# Patient Record
Sex: FEMALE | Race: WHITE | NOT HISPANIC OR LATINO | Employment: FULL TIME | URBAN - METROPOLITAN AREA
[De-identification: names, ages, dates, MRNs, and addresses within clinical notes are randomized per-mention and may not be internally consistent; named-entity substitution may affect disease eponyms.]

---

## 2017-06-27 ENCOUNTER — OFFICE VISIT (OUTPATIENT)
Dept: SURGERY | Facility: CLINIC | Age: 22
End: 2017-06-27
Payer: COMMERCIAL

## 2017-06-27 VITALS
SYSTOLIC BLOOD PRESSURE: 131 MMHG | RESPIRATION RATE: 18 BRPM | WEIGHT: 232.2 LBS | HEIGHT: 63 IN | HEART RATE: 74 BPM | BODY MASS INDEX: 41.14 KG/M2 | OXYGEN SATURATION: 98 % | TEMPERATURE: 98.2 F | DIASTOLIC BLOOD PRESSURE: 86 MMHG

## 2017-06-27 DIAGNOSIS — E06.3 HYPOTHYROIDISM DUE TO HASHIMOTO'S THYROIDITIS: ICD-10-CM

## 2017-06-27 DIAGNOSIS — Z13.228 SCREENING FOR ENDOCRINE, NUTRITIONAL, METABOLIC AND IMMUNITY DISORDER: ICD-10-CM

## 2017-06-27 DIAGNOSIS — Z13.21 SCREENING FOR ENDOCRINE, NUTRITIONAL, METABOLIC AND IMMUNITY DISORDER: ICD-10-CM

## 2017-06-27 DIAGNOSIS — E66.01 MORBID OBESITY WITH BMI OF 50.0-59.9, ADULT (H): Primary | ICD-10-CM

## 2017-06-27 DIAGNOSIS — M25.551 HIP PAIN, RIGHT: ICD-10-CM

## 2017-06-27 DIAGNOSIS — N97.9 INFERTILITY, FEMALE: ICD-10-CM

## 2017-06-27 DIAGNOSIS — E28.2 PCOS (POLYCYSTIC OVARIAN SYNDROME): ICD-10-CM

## 2017-06-27 DIAGNOSIS — E66.01 MORBID OBESITY WITH BMI OF 50.0-59.9, ADULT (H): ICD-10-CM

## 2017-06-27 DIAGNOSIS — Z13.29 SCREENING FOR ENDOCRINE, NUTRITIONAL, METABOLIC AND IMMUNITY DISORDER: ICD-10-CM

## 2017-06-27 DIAGNOSIS — E61.1 IRON DEFICIENCY: ICD-10-CM

## 2017-06-27 DIAGNOSIS — Z13.0 SCREENING FOR ENDOCRINE, NUTRITIONAL, METABOLIC AND IMMUNITY DISORDER: ICD-10-CM

## 2017-06-27 DIAGNOSIS — J45.20 MILD INTERMITTENT ASTHMA WITHOUT COMPLICATION: ICD-10-CM

## 2017-06-27 PROCEDURE — 99205 OFFICE O/P NEW HI 60 MIN: CPT | Performed by: PHYSICIAN ASSISTANT

## 2017-06-27 PROCEDURE — 97802 MEDICAL NUTRITION INDIV IN: CPT | Performed by: DIETITIAN, REGISTERED

## 2017-06-27 RX ORDER — NORETHINDRONE ACETATE AND ETHINYL ESTRADIOL 1MG-20(21)
1 KIT ORAL DAILY
Status: ON HOLD | COMMUNITY
End: 2019-01-09

## 2017-06-27 RX ORDER — ALBUTEROL SULFATE 90 UG/1
2 AEROSOL, METERED RESPIRATORY (INHALATION) EVERY 4 HOURS PRN
COMMUNITY
End: 2021-09-03

## 2017-06-27 RX ORDER — ALBUTEROL SULFATE 5 MG/ML
2.5 SOLUTION RESPIRATORY (INHALATION) EVERY 6 HOURS PRN
COMMUNITY
End: 2017-06-27

## 2017-06-27 NOTE — PATIENT INSTRUCTIONS
Please refer to your task list created today at your appointment.  Make appointment to return to clinic in 1 month to see the dietician.  Have initial labs drawn.     Check in at the [facility] on the 1st floor if you would like to have your blood tests drawn today or call [phone] to make an appointment to have them drawn on another day.  You may also get your blood drawn at your [facility] clinic.

## 2017-06-27 NOTE — MR AVS SNAPSHOT
After Visit Summary   6/27/2017    Zelda Paulson    MRN: [id number]           Patient Information     Date Of Birth          1995        Visit Information        Provider Department      6/27/2017 1:00 PM Nida Fortune PA-C [facility]      Today's Diagnoses     Morbid obesity with BMI of 50.0-59.9, adult (H)    -  1    PCOS (polycystic ovarian syndrome)        Iron deficiency        Screening for endocrine, nutritional, metabolic and immunity disorder        Infertility, female        Hip pain, right          Care Instructions    Please refer to your task list created today at your appointment.  Make appointment to return to clinic in 1 month to see the dietician.  Have initial labs drawn.     [facility] at the [facility] on the 1st floor if you would like to have your blood tests drawn today or call [phone] to make an appointment to have them drawn on another day.  You may also get your blood drawn at your [facility] clinic.\          Follow-ups after your visit        Additional Services     NUTRITION REFERRAL       Type of nutrition instruction needed: Weight Loss  Your provider has referred you to:     [facility]                  Your next 10 appointments already scheduled     Jun 27, 2017  2:00 PM CDT   Evaluation with Thelma Alex 3, RD   [facility] ([facility])    [address] [place] [address] [phone]              Future tests that were ordered for you today     Open Future Orders        Priority Expected Expires Ordered    Ferritin Routine 6/27/2017 6/27/2018 6/27/2017    CBC with platelets Routine 6/27/2017 12/24/2017 6/27/2017    Comprehensive metabolic panel Routine 6/27/2017 12/24/2017 6/27/2017    Hemoglobin A1c Routine 6/27/2017 12/24/2017 6/27/2017    Vitamin D  "Deficiency Routine 6/27/2017 12/24/2017 6/27/2017            Who to contact     If you have questions or need follow up information about today's clinic visit or your schedule please contact [facility] Madi RUST directly at [phone].  Normal or non-critical lab and imaging results will be communicated to you by MyChart, letter or phone within 4 business days after the clinic has received the results. If you do not hear from us within 7 days, please contact the clinic through [employer] or phone. If you have a critical or abnormal lab result, we will notify you by phone as soon as possible.  Submit refill requests through [employer] or call your pharmacy and they will forward the refill request to us. Please allow 3 business days for your refill to be completed.          Additional Information About Your Visit        [employer] Information     [employer] gives you secure access to your electronic health record. If you see a primary care provider, you can also send messages to your care team and make appointments. If you have questions, please call your primary care clinic.  If you do not have a primary care provider, please call [phone] and they will assist you.        Care EveryWhere ID     This is your Care EveryWhere ID. This could be used by other organizations to access your [place] medical records  [id number]        Your Vitals Were     Pulse Temperature Respirations Height Pulse Oximetry BMI (Body Mass Index)    74 98.2  F (36.8  C) 18 5' 2.5\" (1.588 m) 98% 58.17 kg/m2       Blood Pressure from Last 3 Encounters:   06/27/17 131/86    Weight from Last 3 Encounters:   06/27/17 (!) 323 lb 3.2 oz (146.6 kg)              We Performed the Following     NUTRITION REFERRAL     OP ROOMING NOTE TO MARIAN        Primary Care Provider Office Phone # Fax #    Jasmin Celeste [phone]       [facility] [address] [address]        Equal Access to Services  "    DANIEL SORIANO : Hadii aad ku jade Henderson, waaxda luqadaha, qaybta kaalmada sybilabbe, reymundo dominguez haypatricia beltranjuliaayad hernandez . So [facility] [phone].    ATENCIÓN: Si habla español, tiene a shah disposición servicios gratuitos de asistencia lingüística. Llame al [phone].    We comply with applicable federal civil rights laws and [place] laws. We do not discriminate on the basis of race, color, national origin, age, disability sex, sexual orientation or gender identity.            Thank you!     Thank you for choosing [facility]  for your care. Our goal is always to provide you with excellent care. Hearing back from our patients is one way we can continue to improve our services. Please take a few minutes to complete the written survey that you may receive in the mail after your visit with us. Thank you!             Your Updated Medication List - Protect others around you: Learn how to safely use, store and throw away your medicines at www.disposemymeds.org.          This list is accurate as of: 6/27/17  1:54 PM.  Always use your most recent med list.                   Brand Name Dispense Instructions for use Diagnosis    albuterol 108 (90 BASE) MCG/ACT Inhaler    PROAIR HFA/PROVENTIL HFA/VENTOLIN HFA     Inhale 2 puffs into the lungs every 4 hours as needed for shortness of breath / dyspnea or wheezing        LEVOTHYROXINE SODIUM PO      Take 25 mcg by mouth daily        metFORMIN 500 MG tablet    GLUCOPHAGE     Take 500 mg by mouth 2 times daily (with meals)        norethindrone-ethinyl estradiol 1-20 MG-MCG per tablet    JUNEL FE 1/20     Take 1 tablet by mouth daily

## 2017-06-27 NOTE — PROGRESS NOTES
[facility] [place] [facility]  [address]  [place] [address]  Phone [phone] Fax [phone]    Date: 2017    RE: REJI MEJIA  MR#: [id number]  : 1995    Dear Dr. Jasmin Alonso,  [facility]    I had the pleasure of seeing your patient, REJI MEJIA, to evaluate her obesity and consider her for possible weight loss surgery. As you know, REJI is 22 years old. She has been overweight since puberty. She has been morbidly obese for the last 5 years and has been unable to achieve long-term success with weight loss attempts, such as: Calorie Counting.   Has been trying to get pregnant for the last year. Recently stopped trying and instead is looking into doing this surgery.     Comorbidities of obesity from her past medical history include: PCOS, Mild Intermittent Asthma, Infertility.      The symptoms related to her obesity include Irregular menstrual cycles, Reflux, Hip pain,  Shortness of Breath with activity. The following ADLs are hindered due to her weight: Shortness of breath with little activity.    Non-Obesity Related Past Medical History:  hypothyroidism  H/O Kidney stone  Headaches  Iron deficiency    Past Surgical History:  Appendectomy  Tonsillectomy,   [place] Teeth extracted  Family History:  PGF- Lung Cancer: Cancer type: Lung  MGM- non-Hodgkin's Lymphoma  Social History:  She is  x 1 year.   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  Will be going on her honeymoon in September.    ETOH: Socially  Illicit Drug Use: None  Tobacco Use: No  Support system: my  and mother will be available to help the patient in the early post op period. my  and parents is who the patient can count on for support throughout her weight loss journey.  Can you afford three meals per day? Yes  ?Can you afford the $50-60 per month for vitamins? Yes  A 14 point review of system shows:  Pulmonary: Shortness of Breath with activity,  Female:  Irregular menstrual cycles  "on BCP, infertility on metformin, PCOS  Gastrointestinal: heartburn when stressed  Genitourinary: Kidney stones,  HEENT: Headaches ,  Musculoskeletal: Hip pain,  Neurologic: Migraines,  Psychological: Anxiety,  Pulmonary: Morning headaches    /86  Pulse 74  Temp 98.2  F (36.8  C)  Resp 18  Ht 5' 2.5\" (1.588 m)  Wt 232 lb 3.2 oz (105.3 kg)  SpO2 98%  BMI 41.79 kg/m2  PHYSICAL EXAMINATION:  GENERAL: obese, good health, good development, and normal affect., Alert and oriented x3. NAD  HEENT: Trachea midline, Neck supple without lymphadenopathy, thyroidmegaly, or mass., Oral dentition adequate for chewing  CARDIOVASCULAR: Regular rate and rhythm, No murmurs, rubs or gallops  RESPIRATORY: Good breath sounds, Lung sounds clear to auscultation bilaterally  GASTROINTESTINAL: Obese, Soft, Nontender, Non-distended, No organomeagly or masses  LOWER EXTREMITIES: No LE edema. No cyanosis, ulceration, or chronic venous stasis  MUSCULOSKELETAL: Normal gait, Moves all 4 extremities equal and strong  NEUROLOGIC: cranial nerves II-XII grossly intact  SKIN: No intertriginous irritation or rash    In summary, REJI is morbidly obese with a body mass index of 41.8 kg/m2 and the comorbidities stated above. She attended an informational seminar and is a candidate for Laparoscopic Gastric Sleeve. She will have to complete the following pre-requisites:    Received weight loss goal of 5 lb prior to surgery.  Achieve clearance from dietitian to see surgeon.  Have preoperative laboratory tests drawn.  Psychological Evaluation with MMPI and clearance for weight loss surgery.     Today in the office we discussed gastric sleeve surgery. Preoperative, perioperative, and postoperative processes, management, and follow up were addressed. Risks and benefits were outlined including the risk of death, PE, DVT, ulcer, worsening GERD, N/V, stricture, hernia, wound infection, and vitamin deficiencies. We discussed the need for 2 forms of " birth control for the first 18 months following bariatric surgery. All questions were answered. A goal sheet and support group handout were given to the patient.    Once the patient has completed the requirements set forth in paragraph one, and there are no further recommendations, she will be allowed to see the surgeon of her choice for consultation on the Laparoscopic Gastric Sleeve surgery. Patient verbalizes understanding of the process to surgery and expectations for the postoperative period including the need for lifelong lifestyle changes, vitamin supplementation, and laboratory monitoring.    Thank you for allowing us to participate in her care.    Sincerely,      Nida Fortune PA-C    At [facility] we are committed to providing you exceptional care. Our surgeons specialize in performing the following minimally invasive weight-loss surgeries:    Lashell-en-Y gastric bypass  Laparoscopic adjustable gastric band  Sleeve gastrectomy    If you would like to review aspects of our weight loss surgery program, please visit our website at [email or web address]. If you have any questions, please do not hesitate to contact us at [phone].   This was a 60 minute visit with greater than 50% spent on counseling.

## 2017-06-27 NOTE — MR AVS SNAPSHOT
MRN:[id number]                      After Visit Summary   6/27/2017    Zelda Paluson    MRN: [id number]           Visit Information        Provider Department      6/27/2017 2:00 PM 3, Thelma Alex RD [facility]      Your next 10 appointments already scheduled     Jul 25, 2017  9:30 AM CDT   Weight Loss Visit with Sh Wl Diet 3, RD   [facility] ([facility])    [address] [place] [address] [phone]              MyChart Information     [employer] gives you secure access to your electronic health record. If you see a primary care provider, you can also send messages to your care team and make appointments. If you have questions, please call your primary care clinic.  If you do not have a primary care provider, please call [phone] and they will assist you.        Care EveryWhere ID     This is your Care EveryWhere ID. This could be used by other organizations to access your [place] medical records  [id number]        Equal Access to Services     DANIEL SORIANO : Hadii lucy cheek hadasho Soomaali, waaxda luqadaha, qaybta kaalmada adeegyaabbe, reymundo ybarra. So [facility] [phone].    ATENCIÓN: Si habla español, tiene a shah disposición servicios gratuitos de asistencia lingüística. Llame al [phone].    We comply with applicable federal civil rights laws and [place] laws. We do not discriminate on the basis of race, color, national origin, age, disability sex, sexual orientation or gender identity.

## 2017-06-27 NOTE — PROGRESS NOTES
Name: REJI MEJIA  : 1995  Gender: Female  MRN: [id number]  Age: 22  INITIAL BARIATRIC NUTRITION ASSESSMENT  REASON FOR VISIT:  REJI MEJIA is a 22 year old Female presents today for initial nutrition visit for bariatric surgery.  DIAGNOSIS:  Class III Obesity  ANTHROPOMETRICS:  Height: 62.5 inches  Weight: 232.2 lbs  BMI: 41.8 kg/m2  CURRENT SUPPLEMENTS:  Multivitamin/Mineral: none  WEIGHT LOSS ATTEMPTS:  Calorie Counting  Prescription diet medications:  None  NUTRITION HISTORY:  Meals per day: 3  Snacking: Yes  Breakfast: (eats on the way to work 5am and then again at 9)muffins, yogart, oatmeal  Lunch: Leftovers from dinner  Supper: Chicken breasts, salads, pasta  Snacks: hard boiled eggs, cheese sticks, chips (sometimes no snacks, however)  Drinks: Mt. Dew, water, coffee  Emotional eating: No  Binge eating: No  Night eating: No  Can you afford three meals per day? Yes  Can you afford the $50-60 per month for vitamins? Yes  Comments: Pt as been thinking about surgery since she was 18. Believes that giving up soda will be her biggest misbah. 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 Pursuing surgery for fertility reasons however understands recommendation for no pregnancies until 2 y post-op.   DINING OUT HISTORY:  Frequency per week: Around once a week  Location: sit-down restaurants, fast food  Type of food: Mexican, pizza, chinese  PHYSICAL ACTIVITY:  Type: Walking;Other;Climbing stairs at work  Frequency: 1-2x/week  Duration (min): 30  NUTRITION DIAGNOSIS:  Patient with excessive oral food/beverage intake related to intake of calorically dense food/beverages at meals and/or snacks as evidenced by BMI of 41.8kg/m2.   Unchanged, modified below.   INTERVENTION:  Nutrition Prescription: Recommend nutrient/ energy modification   Goals:  Begin taking multivitamin, calcium and vitamin D per guidelines   Take 20 minutes to finish a meal.   Do not exceed 2 cans of pop each day.   Implementation:  Provided  written guidelines for Laparoscopic Gastric Sleeve surgery.  Provided weight loss suggestions.  Explained diet changes that would occur after surgery.  Emphasized importance of diet and lifestyle modifications.  Discussed necessity for chewable multivitamin/ mineral supplements, calcium with vitamin D, vitamin B-12, vitamin D, Iron and vitamin C supplements.  NUTRITION MONITORING AND EVALUATION:  Verbalizes understanding of surgery diet guidelines.  Anticipated compliance: Good    FOLLOW UP: 1 month  Recommend 2 to 3 follow up visits to assist with lifestyle changes or per insurance requirements.  RD name and number provided.  TIME SPENT WITH PATIENT: 60 minutes  Mikki Zuñiga RD, LD  Clinical Dietitian

## 2017-07-14 ENCOUNTER — HOSPITAL ENCOUNTER (OUTPATIENT)
Dept: LAB | Facility: CLINIC | Age: 22
Discharge: HOME | End: 2017-07-14
Attending: PHYSICIAN ASSISTANT | Admitting: PHYSICIAN ASSISTANT
Payer: COMMERCIAL

## 2017-07-14 DIAGNOSIS — Z13.228 SCREENING FOR ENDOCRINE, NUTRITIONAL, METABOLIC AND IMMUNITY DISORDER: ICD-10-CM

## 2017-07-14 DIAGNOSIS — E66.01 MORBID OBESITY WITH BMI OF 50.0-59.9, ADULT (H): ICD-10-CM

## 2017-07-14 DIAGNOSIS — Z13.21 SCREENING FOR ENDOCRINE, NUTRITIONAL, METABOLIC AND IMMUNITY DISORDER: ICD-10-CM

## 2017-07-14 DIAGNOSIS — Z13.29 SCREENING FOR ENDOCRINE, NUTRITIONAL, METABOLIC AND IMMUNITY DISORDER: ICD-10-CM

## 2017-07-14 DIAGNOSIS — E61.1 IRON DEFICIENCY: ICD-10-CM

## 2017-07-14 DIAGNOSIS — E28.2 PCOS (POLYCYSTIC OVARIAN SYNDROME): ICD-10-CM

## 2017-07-14 DIAGNOSIS — Z13.0 SCREENING FOR ENDOCRINE, NUTRITIONAL, METABOLIC AND IMMUNITY DISORDER: ICD-10-CM

## 2017-07-14 LAB
ALBUMIN SERPL-MCNC: 3.2 G/DL (ref 3.4–5)
ALP SERPL-CCNC: 87 U/L (ref 40–150)
ALT SERPL W P-5'-P-CCNC: 71 U/L (ref 0–50)
ANION GAP SERPL CALCULATED.3IONS-SCNC: 7 MMOL/L (ref 3–14)
AST SERPL W P-5'-P-CCNC: 36 U/L (ref 0–45)
BILIRUB SERPL-MCNC: 0.4 MG/DL (ref 0.2–1.3)
BUN SERPL-MCNC: 12 MG/DL (ref 7–30)
CALCIUM SERPL-MCNC: 8.1 MG/DL (ref 8.5–10.1)
CHLORIDE SERPL-SCNC: 106 MMOL/L (ref 94–109)
CO2 SERPL-SCNC: 26 MMOL/L (ref 20–32)
CREAT SERPL-MCNC: 0.74 MG/DL (ref 0.52–1.04)
DEPRECATED CALCIDIOL+CALCIFEROL SERPL-MC: 24 UG/L (ref 20–75)
ERYTHROCYTE [DISTWIDTH] IN BLOOD BY AUTOMATED COUNT: 13.2 % (ref 10–15)
FERRITIN SERPL-MCNC: 35 NG/ML (ref 12–150)
GFR SERPL CREATININE-BSD FRML MDRD: ABNORMAL ML/MIN/1.7M2
GLUCOSE SERPL-MCNC: 74 MG/DL (ref 70–99)
HBA1C MFR BLD: 5.3 % (ref 4.3–6)
HCT VFR BLD AUTO: 38.3 % (ref 35–47)
HGB BLD-MCNC: 12.5 G/DL (ref 11.7–15.7)
MCH RBC QN AUTO: 28 PG (ref 26.5–33)
MCHC RBC AUTO-ENTMCNC: 32.6 G/DL (ref 31.5–36.5)
MCV RBC AUTO: 86 FL (ref 78–100)
PLATELET # BLD AUTO: 295 10E9/L (ref 150–450)
POTASSIUM SERPL-SCNC: 3.7 MMOL/L (ref 3.4–5.3)
PROT SERPL-MCNC: 7.4 G/DL (ref 6.8–8.8)
RBC # BLD AUTO: 4.47 10E12/L (ref 3.8–5.2)
SODIUM SERPL-SCNC: 139 MMOL/L (ref 133–144)
WBC # BLD AUTO: 8.5 10E9/L (ref 4–11)

## 2017-07-14 PROCEDURE — 82728 ASSAY OF FERRITIN: CPT | Performed by: PHYSICIAN ASSISTANT

## 2017-07-14 PROCEDURE — 80053 COMPREHEN METABOLIC PANEL: CPT | Performed by: PHYSICIAN ASSISTANT

## 2017-07-14 PROCEDURE — 36415 COLL VENOUS BLD VENIPUNCTURE: CPT | Performed by: PHYSICIAN ASSISTANT

## 2017-07-14 PROCEDURE — 85027 COMPLETE CBC AUTOMATED: CPT | Performed by: PHYSICIAN ASSISTANT

## 2017-07-14 PROCEDURE — 83036 HEMOGLOBIN GLYCOSYLATED A1C: CPT | Performed by: PHYSICIAN ASSISTANT

## 2017-07-14 PROCEDURE — 82306 VITAMIN D 25 HYDROXY: CPT | Performed by: PHYSICIAN ASSISTANT

## 2017-07-31 ENCOUNTER — OFFICE VISIT (OUTPATIENT)
Dept: SURGERY | Facility: CLINIC | Age: 22
End: 2017-07-31
Payer: COMMERCIAL

## 2017-07-31 DIAGNOSIS — E66.01 MORBID OBESITY WITH BMI OF 50.0-59.9, ADULT (H): ICD-10-CM

## 2017-07-31 PROCEDURE — 97803 MED NUTRITION INDIV SUBSEQ: CPT | Performed by: DIETITIAN, REGISTERED

## 2017-07-31 NOTE — MR AVS SNAPSHOT
MRN:[id number]                      After Visit Summary   7/31/2017    Zelda Paulson    MRN: [id number]           Visit Information        Provider Department      7/31/2017 1:30 PM Thelma Laura RD [facility] [place] [facility]      Your next 10 appointments already scheduled     Aug 16, 2017 10:00 AM CDT   New Visit with Mackenzie Garrido, PhD   [facility] ([facility])    [address] [place] [address]   [phone]            Aug 23, 2017  2:00 PM CDT   Return Visit with Mackenzie Garrido, PhD   [facility] ([facility])    [address] [place] [address]   [phone]            Aug 28, 2017  1:30 PM CDT   Weight Loss Visit with Thelma Laura RD   [facility])    [address] [place] [address] [phone]            Sep 20, 2017  2:00 PM CDT   Return Visit with Mackenzie Garrido, PhD   [facility] ([facility])    [address] [place] [address]   [phone]              MyChart Information     [employer] gives you secure access to your electronic health record. If you see a primary care provider, you can also send messages to your care team and make appointments. If you have questions, please call your primary care clinic.  If you do not have a primary care provider, please call [phone] and they will assist you.        Care EveryWhere ID     This is your Care EveryWhere ID. This could be used by other organizations to access your [facility] medical records  [id number]        Equal Access to Services     DANIEL SORIANO : Merle Henderson, waaxda luqadaha, qaybta kaalmaabbe cortés, reymundo wright  la'patricia ah. So [facility] [phone].    ATENCIÓN: Si habla español, tiene a shah disposición servicios gratuitos de asistencia lingüística. Llame al [phone].    We comply with applicable federal civil rights laws and [place] laws. We do not discriminate on the basis of race, color, national origin, age, disability sex, sexual orientation or gender identity.

## 2017-07-31 NOTE — PROGRESS NOTES
PRE-SURGICAL WEIGHT LOSS NUTRITION APPOINTMENT  DATE OF VISIT: 2017  Name: REJI MEJIA  : 1995  Gender: Female  MRN: [id number]  Age: 22  ASSESSMENT  REASON FOR VISIT:  REJI MEJIA is a 22 year old Female presents today for a pre-surgical weight loss follow-up appointment.  DIAGNOSIS:  Class III, Morbid Obesity.    ANTHROPOMETRICS:  Height: 62.5 inches  Initial Weight: 232.2 lbs  Weight last visit: 232.2 lbs  Current Weight: 231.1 lbs  BMI: 41.6 kg/m2    NUTRITION HISTORY:  Breakfast: 1 egg, toast/ or oatmeal made with water-8:30 or 9:00 (within 1 hour of waking)  Lunch: Leftovers from dinner  Supper: Chicken breasts or beef, salads, pasta  Snacks: hard boiled eggs or cheese sticks or chips or fruit or veggies-0-2 x per day  Drinks: water or enhanced water, coffee  Consuming liquid calories: no  Eating 3 meals per day: yes  Eating slower: no  Chewing foods thoroughly: yes  Take 30 minutes to consume each meal: Sometimes  Fluids and meals separate by at least 30 minutes: Yes  Comments: pt is proud to say she has had only had 1 pop over the past month; more focused on behavior changes than weight loss this month; pt feels like weight loss was a result of avoiding pop  Desired Surgical Procedure: sleeve gastrectomy  PHYSICAL ACTIVITY:  Type: Walking;Climbing stairs at work  Frequency: 1-2x/week  Duration (min): 30  DIAGNOSIS:  Previous Nutrition Diagnosis: Obesity related to excess energy intake as evidenced by BMI of 41.8  no change, modified below  Previous goals:  Begin taking multivitamin, calcium and vitamin D per guidelines-met  Take 20 minutes to finish a meal-improving  Do not exceed 2 cans of pop each day-exceeded  Current Nutrition Diagnosis: Obesity related to excess energy intake as evidenced by BMI of 41.6  IMPLEMENTATION:  Nutrition Prescription: Recommended energy/nutrient modification  Goals:  Increase exercise to 3 X per week for at least 20 minutes  Track intake and activity on иван;  keeping calories at 8324-4433 kcal/day (11-14 kcal/kg ABW) for weight loss  Consistently take 20-30 minutes for each meal   Implementation:  - Discussed progress towards previous goals  - Reinforced importance of making behavior changes in preparation for bariatric surgery.  NUTRITION MONITORING AND EVALUATION:  Anticipated compliance: fair-good  Patient verbalized good understanding of bariatric diet guidelines.  Follow up:  At 1 month  # of visits needed: 1  Cleared by RD: No  TIME SPENT WITH PATIENT: 25 minutes  Brijesh Mzea RD, LD  [facility]  [phone]

## 2017-08-08 ENCOUNTER — HOSPITAL ENCOUNTER (EMERGENCY)
Facility: CLINIC | Age: 22
Discharge: HOME | End: 2017-08-09
Attending: NURSE PRACTITIONER | Admitting: NURSE PRACTITIONER
Payer: COMMERCIAL

## 2017-08-08 ENCOUNTER — APPOINTMENT (OUTPATIENT)
Dept: CT IMAGING | Facility: CLINIC | Age: 22
End: 2017-08-08
Attending: NURSE PRACTITIONER
Payer: COMMERCIAL

## 2017-08-08 ENCOUNTER — APPOINTMENT (OUTPATIENT)
Dept: ULTRASOUND IMAGING | Facility: CLINIC | Age: 22
End: 2017-08-08
Attending: NURSE PRACTITIONER
Payer: COMMERCIAL

## 2017-08-08 DIAGNOSIS — R19.7 DIARRHEA, UNSPECIFIED TYPE: ICD-10-CM

## 2017-08-08 LAB
ALBUMIN SERPL-MCNC: 3.7 G/DL (ref 3.4–5)
ALBUMIN UR-MCNC: 30 MG/DL
ALP SERPL-CCNC: 83 U/L (ref 40–150)
ALT SERPL W P-5'-P-CCNC: 47 U/L (ref 0–50)
ANION GAP SERPL CALCULATED.3IONS-SCNC: 11 MMOL/L (ref 3–14)
APPEARANCE UR: ABNORMAL
AST SERPL W P-5'-P-CCNC: 26 U/L (ref 0–45)
BASOPHILS # BLD AUTO: 0 10E9/L (ref 0–0.2)
BASOPHILS NFR BLD AUTO: 0.1 %
BILIRUB SERPL-MCNC: 0.4 MG/DL (ref 0.2–1.3)
BILIRUB UR QL STRIP: ABNORMAL
BUN SERPL-MCNC: 12 MG/DL (ref 7–30)
CALCIUM SERPL-MCNC: 8.6 MG/DL (ref 8.5–10.1)
CHLORIDE SERPL-SCNC: 105 MMOL/L (ref 94–109)
CO2 SERPL-SCNC: 24 MMOL/L (ref 20–32)
COLOR UR AUTO: YELLOW
CREAT SERPL-MCNC: 0.85 MG/DL (ref 0.52–1.04)
DIFFERENTIAL METHOD BLD: NORMAL
EOSINOPHIL # BLD AUTO: 0 10E9/L (ref 0–0.7)
EOSINOPHIL NFR BLD AUTO: 0 %
ERYTHROCYTE [DISTWIDTH] IN BLOOD BY AUTOMATED COUNT: 13.1 % (ref 10–15)
GFR SERPL CREATININE-BSD FRML MDRD: 84 ML/MIN/1.7M2
GLUCOSE SERPL-MCNC: 93 MG/DL (ref 70–99)
GLUCOSE UR STRIP-MCNC: NEGATIVE MG/DL
HCG UR QL: NEGATIVE
HCT VFR BLD AUTO: 41.6 % (ref 35–47)
HGB BLD-MCNC: 13.5 G/DL (ref 11.7–15.7)
HGB UR QL STRIP: ABNORMAL
IMM GRANULOCYTES # BLD: 0 10E9/L (ref 0–0.4)
IMM GRANULOCYTES NFR BLD: 0.2 %
KETONES UR STRIP-MCNC: >150 MG/DL
LACTATE BLD-SCNC: 1.1 MMOL/L (ref 0.7–2.1)
LEUKOCYTE ESTERASE UR QL STRIP: NEGATIVE
LIPASE SERPL-CCNC: 103 U/L (ref 73–393)
LYMPHOCYTES # BLD AUTO: 1.3 10E9/L (ref 0.8–5.3)
LYMPHOCYTES NFR BLD AUTO: 14.4 %
MCH RBC QN AUTO: 27.6 PG (ref 26.5–33)
MCHC RBC AUTO-ENTMCNC: 32.5 G/DL (ref 31.5–36.5)
MCV RBC AUTO: 85 FL (ref 78–100)
MONOCYTES # BLD AUTO: 0.6 10E9/L (ref 0–1.3)
MONOCYTES NFR BLD AUTO: 6.6 %
MUCOUS THREADS #/AREA URNS LPF: PRESENT /LPF
NEUTROPHILS # BLD AUTO: 7.1 10E9/L (ref 1.6–8.3)
NEUTROPHILS NFR BLD AUTO: 78.7 %
NITRATE UR QL: NEGATIVE
NRBC # BLD AUTO: 0 10*3/UL
NRBC BLD AUTO-RTO: 0 /100
PH UR STRIP: 6 PH (ref 5–7)
PLATELET # BLD AUTO: 263 10E9/L (ref 150–450)
POTASSIUM SERPL-SCNC: 3.6 MMOL/L (ref 3.4–5.3)
PROT SERPL-MCNC: 7.7 G/DL (ref 6.8–8.8)
RBC # BLD AUTO: 4.9 10E12/L (ref 3.8–5.2)
RBC #/AREA URNS AUTO: 5 /HPF (ref 0–2)
SODIUM SERPL-SCNC: 140 MMOL/L (ref 133–144)
SP GR UR STRIP: 1.03 (ref 1–1.03)
SQUAMOUS #/AREA URNS AUTO: 16 /HPF (ref 0–1)
URN SPEC COLLECT METH UR: ABNORMAL
UROBILINOGEN UR STRIP-MCNC: NORMAL MG/DL (ref 0–2)
WBC # BLD AUTO: 9 10E9/L (ref 4–11)
WBC #/AREA URNS AUTO: 0 /HPF (ref 0–2)

## 2017-08-08 PROCEDURE — 96374 THER/PROPH/DIAG INJ IV PUSH: CPT | Mod: 59

## 2017-08-08 PROCEDURE — 83690 ASSAY OF LIPASE: CPT | Performed by: NURSE PRACTITIONER

## 2017-08-08 PROCEDURE — 87493 C DIFF AMPLIFIED PROBE: CPT | Mod: XU | Performed by: NURSE PRACTITIONER

## 2017-08-08 PROCEDURE — 99285 EMERGENCY DEPT VISIT HI MDM: CPT | Mod: 25

## 2017-08-08 PROCEDURE — 25000128 H RX IP 250 OP 636: Performed by: NURSE PRACTITIONER

## 2017-08-08 PROCEDURE — 76705 ECHO EXAM OF ABDOMEN: CPT

## 2017-08-08 PROCEDURE — 85025 COMPLETE CBC W/AUTO DIFF WBC: CPT | Performed by: NURSE PRACTITIONER

## 2017-08-08 PROCEDURE — 81025 URINE PREGNANCY TEST: CPT | Performed by: NURSE PRACTITIONER

## 2017-08-08 PROCEDURE — 96375 TX/PRO/DX INJ NEW DRUG ADDON: CPT

## 2017-08-08 PROCEDURE — 25000125 ZZHC RX 250: Performed by: NURSE PRACTITIONER

## 2017-08-08 PROCEDURE — 83605 ASSAY OF LACTIC ACID: CPT | Performed by: NURSE PRACTITIONER

## 2017-08-08 PROCEDURE — 96361 HYDRATE IV INFUSION ADD-ON: CPT

## 2017-08-08 PROCEDURE — 81001 URINALYSIS AUTO W/SCOPE: CPT | Performed by: NURSE PRACTITIONER

## 2017-08-08 PROCEDURE — 74177 CT ABD & PELVIS W/CONTRAST: CPT

## 2017-08-08 PROCEDURE — 80053 COMPREHEN METABOLIC PANEL: CPT | Performed by: NURSE PRACTITIONER

## 2017-08-08 PROCEDURE — 87506 IADNA-DNA/RNA PROBE TQ 6-11: CPT | Performed by: NURSE PRACTITIONER

## 2017-08-08 PROCEDURE — 96376 TX/PRO/DX INJ SAME DRUG ADON: CPT

## 2017-08-08 RX ORDER — HYDROMORPHONE HYDROCHLORIDE 1 MG/ML
0.5 INJECTION, SOLUTION INTRAMUSCULAR; INTRAVENOUS; SUBCUTANEOUS
Status: COMPLETED | OUTPATIENT
Start: 2017-08-08 | End: 2017-08-08

## 2017-08-08 RX ORDER — SODIUM CHLORIDE 9 MG/ML
1000 INJECTION, SOLUTION INTRAVENOUS CONTINUOUS
Status: DISCONTINUED | OUTPATIENT
Start: 2017-08-08 | End: 2017-08-09 | Stop reason: HOSPADM

## 2017-08-08 RX ORDER — KETOROLAC TROMETHAMINE 30 MG/ML
30 INJECTION, SOLUTION INTRAMUSCULAR; INTRAVENOUS ONCE
Status: COMPLETED | OUTPATIENT
Start: 2017-08-08 | End: 2017-08-08

## 2017-08-08 RX ORDER — IOPAMIDOL 755 MG/ML
107 INJECTION, SOLUTION INTRAVASCULAR ONCE
Status: COMPLETED | OUTPATIENT
Start: 2017-08-08 | End: 2017-08-08

## 2017-08-08 RX ORDER — ONDANSETRON 2 MG/ML
4 INJECTION INTRAMUSCULAR; INTRAVENOUS
Status: COMPLETED | OUTPATIENT
Start: 2017-08-08 | End: 2017-08-08

## 2017-08-08 RX ORDER — ONDANSETRON 2 MG/ML
4 INJECTION INTRAMUSCULAR; INTRAVENOUS ONCE
Status: COMPLETED | OUTPATIENT
Start: 2017-08-08 | End: 2017-08-08

## 2017-08-08 RX ADMIN — IOPAMIDOL 107 ML: 755 INJECTION, SOLUTION INTRAVENOUS at 23:38

## 2017-08-08 RX ADMIN — SODIUM CHLORIDE 73 ML: 9 INJECTION, SOLUTION INTRAVENOUS at 23:38

## 2017-08-08 RX ADMIN — HYDROMORPHONE HYDROCHLORIDE 0.5 MG: 1 INJECTION, SOLUTION INTRAMUSCULAR; INTRAVENOUS; SUBCUTANEOUS at 20:30

## 2017-08-08 RX ADMIN — ONDANSETRON 4 MG: 2 SOLUTION INTRAMUSCULAR; INTRAVENOUS at 22:18

## 2017-08-08 RX ADMIN — SODIUM CHLORIDE 1000 ML: 9 INJECTION, SOLUTION INTRAVENOUS at 22:19

## 2017-08-08 RX ADMIN — ONDANSETRON 4 MG: 2 SOLUTION INTRAMUSCULAR; INTRAVENOUS at 19:45

## 2017-08-08 RX ADMIN — SODIUM CHLORIDE 1000 ML: 9 INJECTION, SOLUTION INTRAVENOUS at 19:45

## 2017-08-08 RX ADMIN — KETOROLAC TROMETHAMINE 30 MG: 30 INJECTION, SOLUTION INTRAMUSCULAR at 19:49

## 2017-08-08 ASSESSMENT — ENCOUNTER SYMPTOMS
VOMITING: 1
DIARRHEA: 1
ABDOMINAL PAIN: 1
BLOOD IN STOOL: 0
NAUSEA: 1

## 2017-08-08 NOTE — ED AVS SNAPSHOT
Emergency Department    [address] [facility] [address]    Phone:  [phone]    Fax:  [phone]                                       Zelda Paulson   MRN: [id number]    Department:   Emergency Department   Date of Visit:  8/8/2017           After Visit Summary Signature Page     I have received my discharge instructions, and my questions have been answered. I have discussed any challenges I see with this plan with the nurse or doctor.    ..........................................................................................................................................  Patient/Patient Representative Signature      ..........................................................................................................................................  Patient Representative Print Name and Relationship to Patient    ..................................................               ................................................  Date                                            Time    ..........................................................................................................................................  Reviewed by Signature/Title    ...................................................              ..............................................  Date                                                            Time

## 2017-08-08 NOTE — LETTER
To Whom it may concern:      Zelda Paulson was seen in our Emergency Department today, 08/09/17. Please excuse her from work on 8/9/2017 and 8/10/2017.  She may return to work/school when improved.    Sincerely,  Alee Levi, CNP

## 2017-08-08 NOTE — ED PROVIDER NOTES
"  History     Chief Complaint:  Nausea, Vomiting, & Diarrhea     HPI   Zelda Paulson is a 22 year old female who presents to the emergency department today for evaluation of nausea, vomiting, and diarrhea. The patient awoke around 0200 this morning with an episode of diarrhea. She continued to have episodes of diarrhea \"every 15 minutes\". She has also had about 4 episodes of vomiting earlier today as well. The patient has not been able to keep any food or liquids down, including some Pepto bismol she took. The patient notes having similar symptoms last week after eating, but those symptoms were less severe and not as persistent as today's. No recent antibiotic usage noted. She does work here in the OR, but has had no recent exposure to C. Diff. No recent abdomen surgeries, but has had her appendix removed when she was a child. She has a history of kidney stones, but her symptoms today are not similar to this. Her last menstrual cycle was about a week ago and was normal. The patient is experiencing some abdominal cramping. No blood in her stool or emesis, fevers, back pain, dysuria, lightheadedness or dizziness, chest pain, shortness of breath. No other symptoms to report at this time.     Allergies:  Fentanyl  Morphine  Sulfa Drugs      Medications:    norethindrone-ethinyl estradiol (JUNEL FE 1/20) 1-20 MG-MCG per tablet  LEVOTHYROXINE SODIUM PO  metFORMIN (GLUCOPHAGE) 500 MG tablet  albuterol (PROAIR HFA/PROVENTIL HFA/VENTOLIN HFA) 108 (90 BASE) MCG/ACT Inhaler    Past Medical History:    Chronic Kidney Disease  Asthma    Past Surgical History:    Appendectomy  Tonsillectomy     Family History:    History reviewed. No pertinent family history.      Social History:  The patient was accompanied to the ED by her mother.  Smoking Status: never  Alcohol Use: Yes   Marital Status:   [2]     Review of Systems   Gastrointestinal: Positive for abdominal pain (cramping), diarrhea (frequent), nausea and vomiting (no " "blood). Negative for blood in stool.   All other systems reviewed and are negative.    Physical Exam     Patient Vitals for the past 24 hrs:   BP Temp Temp src Pulse Resp SpO2 Height Weight   08/09/17 0037 115/73 - - 72 18 97 % - -   08/08/17 1842 128/70 99.9  F (37.7  C) Oral 133 20 98 % 1.575 m (5' 2\") 96.6 kg (213 lb)       Physical Exam  Nursing notes reviewed. Vitals reviewed.  General: Alert. Well kept.  Eyes:  Conjunctiva non-injected, non-icteric.  Neck/Throat: Moist mucous membranes, oropharynx clear without erythema or exudate. No cervical lymphadenopathy.  Normal voice.  Cardiac: Regular rhythm. Normal heart sounds with no murmur/rubs/click. Normal distal pulses.  Pulmonary: Clear and equal breath sounds bilaterally. No crackles/rales. No wheezing  Abdomen: Soft. Non-distended. Right upper quadrant tenderness to palpation. No masses. No guarding or rebound.  Musculoskeletal: Normal gross range of motion of all 4 extremities.    Neurological: Alert and oriented x4.   Skin: Warm and dry without rashes or petechiae. Normal appearance of visualized exposed skin.  Psych: Affect normal. Good eye contact.    Emergency Department Course     Imaging:  Radiology findings were communicated with the patient and her mother who voiced understanding of the findings.  US Abdomen Limited    1. Diffuse fatty infiltration of the liver.  2. Question of mid right renal nonobstructive and nonshadowing stones.  Reading per radiology: SAMMIE BASSETT MD    CT Abdomen Pelvis w Contrast  1. There is some nonspecific fluid in the colon which may be due to  enteritis.  2. No other cause of acute abdominal pain identified.  Reading per radiology: CRISTINA EMMANUEL MD    Laboratory:  Laboratory findings were communicated with the patient and her mother who voiced understanding of the findings.  Lactic Acid: 1.1  Lipase: 103   CBC: WBC 9.0, HGB 13.5,   CMP: AWNL (Creatinine 0.85)  UA with micro: URINEBILIN small (A), Ketone >150 " (A), RBC 5 (H), Albumin 30 (A), Blood trace (A), squamous epithelial 16 (H), mucous present (A) o/w negative   HCG Qualitative Urine: negative      Interventions:  1945 normal saline 1 L  1945 Zofran 4 mg IV  1949 Toradol 30 mg IV  2030 Dilaudid 0.5 mg IV  2218 Zofran 4 mg IV  2219 normal saline 1 L IV     Emergency Department Course:  Nursing notes and vitals reviewed.  1859 entered room.  1903 I performed an exam of the patient as documented above.   IV was inserted and blood was drawn for laboratory testing, results above.  The patient received the above intervention(s).    The patient provided a urine sample here in the emergency department. This was sent for laboratory testing, findings above.   2035 the patient was rechecked and the patient and her mother were updated on the results of her laboratory studies and remaining plan of care.    The patient was sent for a ultrasound while in the emergency department, results above.    2206 the patient was rechecked and the patient and her mother were updated on the results of her imaging studies. She notes still being in pain, consents to CT scan.  The patient was sent for a CT scan while in the emergency department, results above.    0005 I reevaluated the patient.  She has had no emesis and one 30cc stool while in the ED and has tolerated 12 oz water.   I personally reviewed the laboratory results with the Patient and mother and answered all related questions prior to discharge.    Impression & Plan      Medical Decision Making:  Zelda Paulson is a 22 year old female who presents to the emergency department today for evaluation of nausea, vomiting, and diarrhea. Zelda's abdominal exam was concerning for cholecystitis and therefore ultrasound was obtained and was negative for acute findings.  With ongoing abdominal pain I discussed risks and benefits of CT and patient consents.  Ct today revealed non-specific fluid in the colon likely due to enteritis.  Symptoms are  improved after IV fluids and symptomatic treatment and she was able to tolerate oral fluids and has only had one very small BM while in the ED.  The patient is not pregnant.  There is no evidence of urinary tract infection. There has been no travel or antibiotic exposure to suggest more concerning cause of diarrhea, and there has been no hematemesis or BRBPR/melena.  There is no indication for antibiotics today.  Stool culture and C.Diff pending. She initially was tachycardic but vital signs have returned to normal and have remained stable throughout the ED course, and the abdominal re-exam remains benign with mild diffuse tenderness. I believe she is safe for discharge in improved condition at this time with strict return precautions for recurrent vomiting, pain, fever or any other concerning symptoms.  She will be in the care of her mother and was given a work note.  She will follow-up with primary care in 1 day.     Diagnosis:    ICD-10-CM    1. Diarrhea, unspecified type R19.7     There is some nonspecific fluid in the colon which may be due to  enteritis.     Disposition:   Discharge home    Discharge Medications:  New Prescriptions    HYDROCODONE-ACETAMINOPHEN (NORCO) 5-325 MG PER TABLET    Take 1-2 tablets by mouth every 4 hours as needed for moderate to severe pain    ONDANSETRON (ZOFRAN ODT) 4 MG ODT TAB    Take 1 tablet (4 mg) by mouth every 8 hours as needed for nausea     Scribe Disclosure:  ADRIAN, Emeterio Zimmerman, am serving as a scribe at 7:05 PM on 8/8/2017 to document services personally performed by Alee Levi CNP, based on my observations and the provider's statements to me. 8/8/2017    EMERGENCY DEPARTMENT       Alee Levi CNP  08/09/17 0045

## 2017-08-08 NOTE — ED AVS SNAPSHOT
Emergency Department    [address] [facility] [address]    Phone:  [phone]    Fax:  [phone]                                       Zelda Paulson   MRN: [id number]    Department:   Emergency Department   Date of Visit:  8/8/2017           Patient Information     Date Of Birth          1995        Your diagnoses for this visit were:     Diarrhea, unspecified type There is some nonspecific fluid in the colon which may be due to  enteritis.       You were seen by Alee Levi, CNP.      Follow-up Information     Follow up with Jasmin Alonso In 1 day.    Specialty:  Family Practice    Contact information:    [facility]  [address] [facility] [address]  [facility] [id number] [phone]          Follow up with  Emergency Department.    Specialty:  EMERGENCY MEDICINE    Why:  As needed, If symptoms worsen    Contact information:    [address] [facility] [id number] [phone]        Discharge Instructions         Uncertain Causes of Diarrhea (Adult)    Diarrhea is when stools are loose and watery. This can be caused by:    Viral infections    Bacterial infections    Food poisoning    Parasites    Irritable bowel syndrome (IBS)    Inflammatory bowel diseases such as ulcerative colitis, Crohn's disease, and celiac disease    Food intolerance, such as to lactose, the sugar found in milk and milk products    Reaction to medicines like antibiotics, laxatives, cancer drugs, and antacids  Along with diarrhea, you may also have:    Abdominal pain and cramping    Nausea and vomiting    Loss of bowel control    Fever and chills    Bloody stools  In some cases, antibiotics may help to treat diarrhea. You may have a stool sample test. This is done to see what is causing your diarrhea, and if antibiotics will help treat it. The results of a stool sample test may take up to 2 days. The healthcare provider may not give you antibiotics until he or she has the stool test  results.  Diarrhea can cause dehydration. This is the loss of too much water and other fluids from the body. When this occurs, body fluid must be replaced. This can be done with oral rehydration solutions. Oral rehydration solutions are available at drugstores and grocery stores without a prescription.  Home care  Follow all instructions given by your healthcare provider. Rest at home for the next 24 hours, or until you feel better. Avoid caffeine, tobacco, and alcohol. These can make diarrhea, cramping, and pain worse.  If taking medicines:    Don t take over-the-counter diarrhea or nausea medicines unless your healthcare provider tells you to.    You may use acetaminophen or NSAID medicines like ibuprofen or naproxen to reduce pain and fever. Don t use these if you have chronic liver or kidney disease, or ever had a stomach ulcer or gastrointestinal bleeding. Don't use NSAID medicines if you are already taking one for another condition (like arthritis) or are on daily aspirin therapy (such as for heart disease or after a stroke). Talk with your healthcare provider first.    If antibiotics were prescribed, be sure you take them until they are finished. Don t stop taking them even when you feel better. Antibiotics must be taken as a full course.  To prevent the spread of illness:    Remember that washing with soap and water and using alcohol-based  is the best way to prevent the spread of infection.    Clean the toilet after each use.    Wash your hands before eating.    Wash your hands before and after preparing food. Keep in mind that people with diarrhea or vomiting should not prepare food for others.    Wash your hands after using cutting boards, countertops, and knives that have been in contact with raw foods.    Wash and then peel fruits and vegetables.    Keep uncooked meats away from cooked and ready-to-eat foods.    Use a food thermometer when cooking. Cook poultry to at least 165 F (74 C). Cook  ground meat (beef, veal, pork, lamb) to at least 160 F (71 C). Cook fresh beef, veal, lamb, and pork to at least 145 F (63 C).    Don t eat raw or undercooked eggs (poached or yas side up), poultry, meat, or unpasteurized milk and juices.  Food and drinks  The main goal while treating vomiting or diarrhea is to prevent dehydration. This is done by taking small amounts of liquids often.    Keep in mind that liquids are more important than food right now.    Drink only small amounts of liquids at a time.    Don t force yourself to eat, especially if you are having cramping, vomiting, or diarrhea. Don t eat large amounts at a time, even if you are hungry.    If you eat, avoid fatty, greasy, spicy, or fried foods.    Don t eat dairy foods or drink milk if you have diarrhea. These can make diarrhea worse.  During the first 24 hours you can try:    Oral rehydration solutions. Do not use sports drinks. They have too much sugar and not enough electrolytes.    Soft drinks without caffeine    Ginger ale    Water (plain or flavored)    Decaf tea or coffee    Clear broth, consommé, or bouillon    Gelatin, popsicles, or frozen fruit juice bars  The second 24 hours, if you are feeling better, you can add:    Hot cereal, plain toast, bread, rolls, or crackers    Plain noodles, rice, mashed potatoes, chicken noodle soup, or rice soup    Unsweetened canned fruit (no pineapple)    Bananas  As you recover:    Limit fat intake to less than 15 grams per day. Don t eat margarine, butter, oils, mayonnaise, sauces, gravies, fried foods, peanut butter, meat, poultry, or fish.    Limit fiber. Don t eat raw or cooked vegetables, fresh fruits except bananas, or bran cereals.    Limit caffeine and chocolate.    Limit dairy.    Don t use spices or seasonings except salt.    Go back to your normal diet over time, as you feel better and your symptoms improve.    If the symptoms come back, go back to a simple diet or clear liquids.  Follow-up  care  Follow up with your healthcare provider, or as advised. If a stool sample was taken or cultures were done, call the healthcare provider for the results as instructed.  Call 911  Call 911 if you have any of these symptoms:    Trouble breathing    Confusion    Extreme drowsiness or trouble walking    Loss of consciousness    Rapid heart rate    Chest pain    Stiff neck    Seizure  When to seek medical advice  Call your healthcare provider right away if any of these occur:    Abdominal pain that gets worse    Constant lower right abdominal pain    Continued vomiting and inability to keep liquids down    Diarrhea more than 5 times a day    Blood in vomit or stool    Dark urine or no urine for 8 hours, dry mouth and tongue, tiredness, weakness, or dizziness    Drowsiness    New rash    You don t get better in 2 to 3 days    Fever of 100.4 F (38 C) or higher that doesn t get lower with medicine  Date Last Reviewed: 1/3/2016    6547-4085 [employer]. [address] [place] [address]. All rights reserved. This information is not intended as a substitute for professional medical care. Always follow your healthcare professional's instructions.        Future Appointments        Provider Department Dept Phone Center    8/16/2017 10:00 AM Mackenzie Garrido, PhD [facility] [phone] [facility]    8/23/2017 2:00 PM Mackenzie Garrido, PhD [facility] [phone] [facility]    8/28/2017 1:30 PM Thelma Wl Diet [address], RD [facility] [phone] [facility]    9/20/2017 2:00 PM Mackenzie Garrido, PhD [facility] [phone] [facility]      24 Hour Appointment Hotline       To make an appointment at any [place] clinic, call [phone] ([phone]). If you don't have a family doctor or clinic, we will help you find one.  [facility] are conveniently located to serve the needs of you and your family.             Review of your medicines      START taking        Dose / Directions Last dose taken    HYDROcodone-acetaminophen 5-325 MG per tablet   Commonly known as:  NORCO   Dose:  1-2 tablet   Quantity:  15 tablet        Take 1-2 tablets by mouth every 4 hours as needed for moderate to severe pain   Refills:  0        ondansetron 4 MG ODT tab   Commonly known as:  ZOFRAN ODT   Dose:  4 mg   Quantity:  10 tablet        Take 1 tablet (4 mg) by mouth every 8 hours as needed for nausea   Refills:  0          Our records show that you are taking the medicines listed below. If these are incorrect, please call your family doctor or clinic.        Dose / Directions Last dose taken    albuterol 108 (90 BASE) MCG/ACT Inhaler   Commonly known as:  PROAIR HFA/PROVENTIL HFA/VENTOLIN HFA   Dose:  2 puff        Inhale 2 puffs into the lungs every 4 hours as needed for shortness of breath / dyspnea or wheezing   Refills:  0        LEVOTHYROXINE SODIUM PO   Dose:  25 mcg        Take 25 mcg by mouth daily   Refills:  0        metFORMIN 500 MG tablet   Commonly known as:  GLUCOPHAGE   Dose:  500 mg        Take 500 mg by mouth 2 times daily (with meals)   Refills:  0        norethindrone-ethinyl estradiol 1-20 MG-MCG per tablet   Commonly known as:  JUNEL FE 1/20   Dose:  1 tablet        Take 1 tablet by mouth daily   Refills:  0                Prescriptions were sent or printed at these locations (2 Prescriptions)                   Other Prescriptions                Printed at Department/Unit printer (2 of 2)         ondansetron (ZOFRAN ODT) 4 MG ODT tab               HYDROcodone-acetaminophen (NORCO) 5-325 MG per tablet                Procedures and tests performed during your visit     Procedure/Test Number of Times Performed    CBC with platelets differential 1    CT Abdomen Pelvis w Contrast 1    Clostridium difficile toxin B PCR 2     Comprehensive metabolic panel 1    Enteric Bacteria and Virus Panel by [facility] Stool 2    HCG qualitative urine 1    Lactic acid whole blood 1    Lipase 1    UA reflex to Microscopic and Culture 1    US Abdomen Limited 1      Orders Needing Specimen Collection     None      Pending Results     Date and Time Order Name Status Description    8/8/2017 2319 Enteric Bacteria and Virus Panel by HAMZAH Stool In process     8/8/2017 2319 Clostridium difficile toxin B PCR In process             Pending Culture Results     Date and Time Order Name Status Description    8/8/2017 2319 Enteric Bacteria and Virus Panel by HAMZAH Stool In process     8/8/2017 2319 Clostridium difficile toxin B PCR In process             Pending Results Instructions     If you had any lab results that were not finalized at the time of your Discharge, you can call the ED Lab Result RN at [phone]. You will be contacted by this team for any positive Lab results or changes in treatment. The nurses are available 7 days a week from 10A to 6:30P.  You can leave a message 24 hours per day and they will return your call.        Test Results From Your Hospital Stay        8/8/2017  8:03 PM      Component Results     Component Value Ref Range & Units Status    WBC 9.0 4.0 - 11.0 10e9/L Final    RBC Count 4.90 3.8 - 5.2 10e12/L Final    Hemoglobin 13.5 11.7 - 15.7 g/dL Final    Hematocrit 41.6 35.0 - 47.0 % Final    MCV 85 78 - 100 fl Final    MCH 27.6 26.5 - 33.0 pg Final    MCHC 32.5 31.5 - 36.5 g/dL Final    RDW 13.1 10.0 - 15.0 % Final    Platelet Count 263 150 - 450 10e9/L Final    Diff Method Automated Method  Final    % Neutrophils 78.7 % Final    % Lymphocytes 14.4 % Final    % Monocytes 6.6 % Final    % Eosinophils 0.0 % Final    % Basophils 0.1 % Final    % Immature Granulocytes 0.2 % Final    Nucleated RBCs 0 0 /100 Final    Absolute Neutrophil 7.1 1.6 - 8.3 10e9/L Final    Absolute Lymphocytes 1.3 0.8 - 5.3 10e9/L Final    Absolute Monocytes 0.6 0.0 -  1.3 10e9/L Final    Absolute Eosinophils 0.0 0.0 - 0.7 10e9/L Final    Absolute Basophils 0.0 0.0 - 0.2 10e9/L Final    Abs Immature Granulocytes 0.0 0 - 0.4 10e9/L Final    Absolute Nucleated RBC 0.0  Final         8/8/2017  8:18 PM      Component Results     Component Value Ref Range & Units Status    Sodium 140 133 - 144 mmol/L Final    Potassium 3.6 3.4 - 5.3 mmol/L Final    Chloride 105 94 - 109 mmol/L Final    Carbon Dioxide 24 20 - 32 mmol/L Final    Anion Gap 11 3 - 14 mmol/L Final    Glucose 93 70 - 99 mg/dL Final    Urea Nitrogen 12 7 - 30 mg/dL Final    Creatinine 0.85 0.52 - 1.04 mg/dL Final    GFR Estimate 84 >60 mL/min/1.7m2 Final    Non  GFR Calc    GFR Estimate If Black >90   GFR Calc   >60 mL/min/1.7m2 Final    Calcium 8.6 8.5 - 10.1 mg/dL Final    Bilirubin Total 0.4 0.2 - 1.3 mg/dL Final    Albumin 3.7 3.4 - 5.0 g/dL Final    Protein Total 7.7 6.8 - 8.8 g/dL Final    Alkaline Phosphatase 83 40 - 150 U/L Final    ALT 47 0 - 50 U/L Final    AST 26 0 - 45 U/L Final         8/8/2017  8:18 PM      Component Results     Component Value Ref Range & Units Status    Lipase 103 73 - 393 U/L Final         8/8/2017  8:04 PM      Component Results     Component Value Ref Range & Units Status    Lactic Acid 1.1 0.7 - 2.1 mmol/L Final         8/8/2017  9:35 PM      Narrative     RIGHT UPPER QUADRANT ULTRASOUND 8/8/2017 8:57 PM    HISTORY: Right upper quadrant pain. Diarrhea.    COMPARISON: None.    FINDINGS:    Gallbladder: Normal with no cholelithiasis, wall thickening or focal  tenderness.      Bile ducts: CHD is normal diameter. No intrahepatic biliary  dilatation.    Liver: Diffuse fatty infiltration of the liver. Normal overall size.  No focal lesions.    Pancreas: Head and tail are obscured by gas. Neck and body appear  normal.    Right kidney: Question of tiny stones in the mid right renal calyces.  No hydronephrosis. Otherwise normal.        Impression     IMPRESSION:     1. Diffuse fatty infiltration of the liver.  2. Question of mid right renal nonobstructive and nonshadowing stones.    SAMMIE BASSETT MD         8/8/2017  8:07 PM      Component Results     Component Value Ref Range & Units Status    Color Urine Yellow  Final    Appearance Urine Slightly Cloudy  Final    Glucose Urine Negative NEG mg/dL Final    Bilirubin Urine  NEG Final    Small   This is an unconfirmed screening test result. A positive result may be false.   (A)    Ketones Urine >150 (A) NEG mg/dL Final    Specific Gravity Urine 1.032 1.003 - 1.035 Final    Blood Urine Trace (A) NEG Final    pH Urine 6.0 5.0 - 7.0 pH Final    Protein Albumin Urine 30 (A) NEG mg/dL Final    Urobilinogen mg/dL Normal 0.0 - 2.0 mg/dL Final    Nitrite Urine Negative NEG Final    Leukocyte Esterase Urine Negative NEG Final    Source Catheterized Urine  Final    RBC Urine 5 (H) 0 - 2 /HPF Final    WBC Urine 0 0 - 2 /HPF Final    Squamous Epithelial /HPF Urine 16 (H) 0 - 1 /HPF Final    Mucous Urine Present (A) NEG /LPF Final         8/8/2017  8:12 PM      Component Results     Component Value Ref Range & Units Status    HCG Qual Urine Negative NEG Final         8/9/2017 12:09 AM      Narrative     CT ABDOMEN PELVIS W CONTRAST  8/8/2017 11:41 PM     HISTORY: Abdominal pain and diarrhea.    TECHNIQUE: Volumetric acquisition through abdomen and pelvis with IV  contrast. 107 mL Isovue-370. Radiation dose for this scan was reduced  using automated exposure control, adjustment of the mA and/or kV  according to patient size, or iterative reconstruction technique.    COMPARISON: None.    FINDINGS: The liver, gallbladder, spleen, pancreas, adrenal glands and  kidneys demonstrate no worrisome findings. No hydronephrosis.  Visualized lung bases are clear. There is some nonspecific fluid in  the colon. Appendix is not confidently identified. No inflammatory  changes in the right lower quadrant. There are multiple small  nonenlarged mesenteric  lymph nodes. No bowel obstruction or focal  inflammatory changes. No bowel wall thickening demonstrated.    Uterus is present. No suspicious pelvic masses. No fluid collections  or free air.        Impression     IMPRESSION:  1. There is some nonspecific fluid in the colon which may be due to  enteritis.  2. No other cause of acute abdominal pain identified.    CRISTINA EMMANUEL MD         8/8/2017 11:23 PM         8/8/2017 11:23 PM                Clinical Quality Measure: Blood Pressure Screening     Your blood pressure was checked while you were in the emergency department today. The last reading we obtained was  BP: 128/70 . Please read the guidelines below about what these numbers mean and what you should do about them.  If your systolic blood pressure (the top number) is less than 120 and your diastolic blood pressure (the bottom number) is less than 80, then your blood pressure is normal. There is nothing more that you need to do about it.  If your systolic blood pressure (the top number) is 120-139 or your diastolic blood pressure (the bottom number) is 80-89, your blood pressure may be higher than it should be. You should have your blood pressure rechecked within a year by a primary care provider.  If your systolic blood pressure (the top number) is 140 or greater or your diastolic blood pressure (the bottom number) is 90 or greater, you may have high blood pressure. High blood pressure is treatable, but if left untreated over time it can put you at risk for heart attack, stroke, or kidney failure. You should have your blood pressure rechecked by a primary care provider within the next 4 weeks.  If your provider in the emergency department today gave you specific instructions to follow-up with your doctor or provider even sooner than that, you should follow that instruction and not wait for up to 4 weeks for your follow-up visit.        Thank you for choosing [facility]       Thank you for choosing [facility]  for your care. Our goal is always to provide you with excellent care. Hearing back from our patients is one way we can continue to improve our services. Please take a few minutes to complete the written survey that you may receive in the mail after you visit with us. Thank you!        [employer] Information     [employer] gives you secure access to your electronic health record. If you see a primary care provider, you can also send messages to your care team and make appointments. If you have questions, please call your primary care clinic.  If you do not have a primary care provider, please call [phone] and they will assist you.        Care EveryWhere ID     This is your Care EveryWhere ID. This could be used by other organizations to access your [place] medical records  [id number]        Equal Access to Services     DANIEL SORIANO : Merle Henderson, david estrada, nancy cortés, reymundo ybarra. So [facility] [phone].    ATENCIÓN: Si habla español, tiene a shah disposición servicios gratuitos de asistencia lingüística. Llame al [phone].    We comply with applicable federal civil rights laws and [place] laws. We do not discriminate on the basis of race, color, national origin, age, disability sex, sexual orientation or gender identity.            After Visit Summary       This is your record. Keep this with you and show to your community pharmacist(s) and doctor(s) at your next visit.

## 2017-08-09 ENCOUNTER — HOSPITAL ENCOUNTER (EMERGENCY)
Facility: CLINIC | Age: 22
Discharge: HOME | End: 2017-08-09
Attending: EMERGENCY MEDICINE | Admitting: EMERGENCY MEDICINE
Payer: COMMERCIAL

## 2017-08-09 VITALS
DIASTOLIC BLOOD PRESSURE: 73 MMHG | WEIGHT: 213 LBS | BODY MASS INDEX: 39.2 KG/M2 | HEART RATE: 72 BPM | SYSTOLIC BLOOD PRESSURE: 115 MMHG | OXYGEN SATURATION: 97 % | RESPIRATION RATE: 18 BRPM | TEMPERATURE: 99.9 F | HEIGHT: 62 IN

## 2017-08-09 VITALS
OXYGEN SATURATION: 98 % | TEMPERATURE: 98.1 F | BODY MASS INDEX: 39.2 KG/M2 | HEIGHT: 62 IN | SYSTOLIC BLOOD PRESSURE: 134 MMHG | WEIGHT: 213 LBS | DIASTOLIC BLOOD PRESSURE: 57 MMHG

## 2017-08-09 DIAGNOSIS — K52.9 ENTERITIS: ICD-10-CM

## 2017-08-09 DIAGNOSIS — R19.7 DIARRHEA, UNSPECIFIED TYPE: ICD-10-CM

## 2017-08-09 LAB
ALBUMIN SERPL-MCNC: 3.3 G/DL (ref 3.4–5)
ALP SERPL-CCNC: 70 U/L (ref 40–150)
ALT SERPL W P-5'-P-CCNC: 55 U/L (ref 0–50)
ANION GAP SERPL CALCULATED.3IONS-SCNC: 9 MMOL/L (ref 3–14)
AST SERPL W P-5'-P-CCNC: 37 U/L (ref 0–45)
BASOPHILS # BLD AUTO: 0 10E9/L (ref 0–0.2)
BASOPHILS NFR BLD AUTO: 0.2 %
BILIRUB SERPL-MCNC: 0.2 MG/DL (ref 0.2–1.3)
BUN SERPL-MCNC: 11 MG/DL (ref 7–30)
C COLI+JEJUNI+LARI FUSA STL QL NAA+PROBE: NOT DETECTED
C DIFF TOX B STL QL: NORMAL
CALCIUM SERPL-MCNC: 8.2 MG/DL (ref 8.5–10.1)
CHLORIDE SERPL-SCNC: 104 MMOL/L (ref 94–109)
CO2 SERPL-SCNC: 26 MMOL/L (ref 20–32)
CREAT SERPL-MCNC: 0.77 MG/DL (ref 0.52–1.04)
DIFFERENTIAL METHOD BLD: NORMAL
EC STX1 GENE STL QL NAA+PROBE: NOT DETECTED
EC STX2 GENE STL QL NAA+PROBE: NOT DETECTED
ENTERIC PATHOGEN COMMENT: NORMAL
EOSINOPHIL # BLD AUTO: 0 10E9/L (ref 0–0.7)
EOSINOPHIL NFR BLD AUTO: 0.7 %
ERYTHROCYTE [DISTWIDTH] IN BLOOD BY AUTOMATED COUNT: 13.1 % (ref 10–15)
GFR SERPL CREATININE-BSD FRML MDRD: ABNORMAL ML/MIN/1.7M2
GLUCOSE SERPL-MCNC: 73 MG/DL (ref 70–99)
HCT VFR BLD AUTO: 39.9 % (ref 35–47)
HGB BLD-MCNC: 13 G/DL (ref 11.7–15.7)
IMM GRANULOCYTES # BLD: 0 10E9/L (ref 0–0.4)
IMM GRANULOCYTES NFR BLD: 0 %
LIPASE SERPL-CCNC: 93 U/L (ref 73–393)
LYMPHOCYTES # BLD AUTO: 1.7 10E9/L (ref 0.8–5.3)
LYMPHOCYTES NFR BLD AUTO: 28.2 %
MCH RBC QN AUTO: 27.8 PG (ref 26.5–33)
MCHC RBC AUTO-ENTMCNC: 32.6 G/DL (ref 31.5–36.5)
MCV RBC AUTO: 85 FL (ref 78–100)
MONOCYTES # BLD AUTO: 0.6 10E9/L (ref 0–1.3)
MONOCYTES NFR BLD AUTO: 9.6 %
NEUTROPHILS # BLD AUTO: 3.6 10E9/L (ref 1.6–8.3)
NEUTROPHILS NFR BLD AUTO: 61.3 %
NOROV GI+II ORF1-ORF2 JNC STL QL NAA+PR: NOT DETECTED
NRBC # BLD AUTO: 0 10*3/UL
NRBC BLD AUTO-RTO: 0 /100
PLATELET # BLD AUTO: 224 10E9/L (ref 150–450)
POTASSIUM SERPL-SCNC: 3.5 MMOL/L (ref 3.4–5.3)
PROT SERPL-MCNC: 6.9 G/DL (ref 6.8–8.8)
RBC # BLD AUTO: 4.67 10E12/L (ref 3.8–5.2)
RVA NSP5 STL QL NAA+PROBE: NOT DETECTED
SALMONELLA SP RPOD STL QL NAA+PROBE: NOT DETECTED
SHIGELLA SP+EIEC IPAH STL QL NAA+PROBE: NOT DETECTED
SODIUM SERPL-SCNC: 139 MMOL/L (ref 133–144)
SPECIMEN SOURCE: NORMAL
V CHOL+PARA RFBL+TRKH+TNAA STL QL NAA+PR: NOT DETECTED
WBC # BLD AUTO: 5.9 10E9/L (ref 4–11)
Y ENTERO RECN STL QL NAA+PROBE: NOT DETECTED

## 2017-08-09 PROCEDURE — 25000128 H RX IP 250 OP 636: Performed by: EMERGENCY MEDICINE

## 2017-08-09 PROCEDURE — 80053 COMPREHEN METABOLIC PANEL: CPT | Performed by: EMERGENCY MEDICINE

## 2017-08-09 PROCEDURE — 85025 COMPLETE CBC W/AUTO DIFF WBC: CPT | Performed by: EMERGENCY MEDICINE

## 2017-08-09 PROCEDURE — 96361 HYDRATE IV INFUSION ADD-ON: CPT

## 2017-08-09 PROCEDURE — 99284 EMERGENCY DEPT VISIT MOD MDM: CPT | Mod: 25

## 2017-08-09 PROCEDURE — 25000132 ZZH RX MED GY IP 250 OP 250 PS 637: Performed by: EMERGENCY MEDICINE

## 2017-08-09 PROCEDURE — 83690 ASSAY OF LIPASE: CPT | Performed by: EMERGENCY MEDICINE

## 2017-08-09 PROCEDURE — 96374 THER/PROPH/DIAG INJ IV PUSH: CPT

## 2017-08-09 RX ORDER — ONDANSETRON 4 MG/1
4 TABLET, ORALLY DISINTEGRATING ORAL EVERY 8 HOURS PRN
Qty: 10 TABLET | Refills: 0 | Status: SHIPPED | OUTPATIENT
Start: 2017-08-09 | End: 2017-08-12

## 2017-08-09 RX ORDER — ONDANSETRON 2 MG/ML
4 INJECTION INTRAMUSCULAR; INTRAVENOUS
Status: COMPLETED | OUTPATIENT
Start: 2017-08-09 | End: 2017-08-09

## 2017-08-09 RX ORDER — DIPHENOXYLATE HCL/ATROPINE 2.5-.025MG
2 TABLET ORAL
Status: COMPLETED | OUTPATIENT
Start: 2017-08-09 | End: 2017-08-09

## 2017-08-09 RX ORDER — HYDROCODONE BITARTRATE AND ACETAMINOPHEN 5; 325 MG/1; MG/1
1-2 TABLET ORAL EVERY 4 HOURS PRN
Qty: 15 TABLET | Refills: 0 | Status: SHIPPED | OUTPATIENT
Start: 2017-08-09 | End: 2019-01-08

## 2017-08-09 RX ADMIN — DIPHENOXYLATE HYDROCHLORIDE AND ATROPINE SULFATE 2 TABLET: 2.5; .025 TABLET ORAL at 17:07

## 2017-08-09 RX ADMIN — SODIUM CHLORIDE 2000 ML: 9 INJECTION, SOLUTION INTRAVENOUS at 16:41

## 2017-08-09 RX ADMIN — SODIUM CHLORIDE 1000 ML: 9 INJECTION, SOLUTION INTRAVENOUS at 16:00

## 2017-08-09 RX ADMIN — ONDANSETRON 4 MG: 2 SOLUTION INTRAMUSCULAR; INTRAVENOUS at 16:39

## 2017-08-09 ASSESSMENT — ENCOUNTER SYMPTOMS
CONSTIPATION: 0
ABDOMINAL PAIN: 1
FEVER: 0
NAUSEA: 1
VOMITING: 1
BLOOD IN STOOL: 0
DIARRHEA: 1

## 2017-08-09 NOTE — ED AVS SNAPSHOT
Emergency Department    [address] [facility] [address]    Phone:  [phone]    Fax:  [phone]                                       Zelda Paulson   MRN: [id number]    Department:   Emergency Department   Date of Visit:  8/9/2017           After Visit Summary Signature Page     I have received my discharge instructions, and my questions have been answered. I have discussed any challenges I see with this plan with the nurse or doctor.    ..........................................................................................................................................  Patient/Patient Representative Signature      ..........................................................................................................................................  Patient Representative Print Name and Relationship to Patient    ..................................................               ................................................  Date                                            Time    ..........................................................................................................................................  Reviewed by Signature/Title    ...................................................              ..............................................  Date                                                            Time

## 2017-08-09 NOTE — DISCHARGE INSTRUCTIONS
Uncertain Causes of Diarrhea (Adult)    Diarrhea is when stools are loose and watery. This can be caused by:    Viral infections    Bacterial infections    Food poisoning    Parasites    Irritable bowel syndrome (IBS)    Inflammatory bowel diseases such as ulcerative colitis, Crohn's disease, and celiac disease    Food intolerance, such as to lactose, the sugar found in milk and milk products    Reaction to medicines like antibiotics, laxatives, cancer drugs, and antacids  Along with diarrhea, you may also have:    Abdominal pain and cramping    Nausea and vomiting    Loss of bowel control    Fever and chills    Bloody stools  In some cases, antibiotics may help to treat diarrhea. You may have a stool sample test. This is done to see what is causing your diarrhea, and if antibiotics will help treat it. The results of a stool sample test may take up to 2 days. The healthcare provider may not give you antibiotics until he or she has the stool test results.  Diarrhea can cause dehydration. This is the loss of too much water and other fluids from the body. When this occurs, body fluid must be replaced. This can be done with oral rehydration solutions. Oral rehydration solutions are available at drugstores and grocery stores without a prescription.  Home care  Follow all instructions given by your healthcare provider. Rest at home for the next 24 hours, or until you feel better. Avoid caffeine, tobacco, and alcohol. These can make diarrhea, cramping, and pain worse.  If taking medicines:    Don t take over-the-counter diarrhea or nausea medicines unless your healthcare provider tells you to.    You may use acetaminophen or NSAID medicines like ibuprofen or naproxen to reduce pain and fever. Don t use these if you have chronic liver or kidney disease, or ever had a stomach ulcer or gastrointestinal bleeding. Don't use NSAID medicines if you are already taking one for another condition (like arthritis) or are on daily  aspirin therapy (such as for heart disease or after a stroke). Talk with your healthcare provider first.    If antibiotics were prescribed, be sure you take them until they are finished. Don t stop taking them even when you feel better. Antibiotics must be taken as a full course.  To prevent the spread of illness:    Remember that washing with soap and water and using alcohol-based  is the best way to prevent the spread of infection.    Clean the toilet after each use.    Wash your hands before eating.    Wash your hands before and after preparing food. Keep in mind that people with diarrhea or vomiting should not prepare food for others.    Wash your hands after using cutting boards, countertops, and knives that have been in contact with raw foods.    Wash and then peel fruits and vegetables.    Keep uncooked meats away from cooked and ready-to-eat foods.    Use a food thermometer when cooking. Cook poultry to at least 165 F (74 C). Cook ground meat (beef, veal, pork, lamb) to at least 160 F (71 C). Cook fresh beef, veal, lamb, and pork to at least 145 F (63 C).    Don t eat raw or undercooked eggs (poached or yas side up), poultry, meat, or unpasteurized milk and juices.  Food and drinks  The main goal while treating vomiting or diarrhea is to prevent dehydration. This is done by taking small amounts of liquids often.    Keep in mind that liquids are more important than food right now.    Drink only small amounts of liquids at a time.    Don t force yourself to eat, especially if you are having cramping, vomiting, or diarrhea. Don t eat large amounts at a time, even if you are hungry.    If you eat, avoid fatty, greasy, spicy, or fried foods.    Don t eat dairy foods or drink milk if you have diarrhea. These can make diarrhea worse.  During the first 24 hours you can try:    Oral rehydration solutions. Do not use sports drinks. They have too much sugar and not enough electrolytes.    Soft drinks without  caffeine    Ginger ale    Water (plain or flavored)    Decaf tea or coffee    Clear broth, consommé, or bouillon    Gelatin, popsicles, or frozen fruit juice bars  The second 24 hours, if you are feeling better, you can add:    Hot cereal, plain toast, bread, rolls, or crackers    Plain noodles, rice, mashed potatoes, chicken noodle soup, or rice soup    Unsweetened canned fruit (no pineapple)    Bananas  As you recover:    Limit fat intake to less than 15 grams per day. Don t eat margarine, butter, oils, mayonnaise, sauces, gravies, fried foods, peanut butter, meat, poultry, or fish.    Limit fiber. Don t eat raw or cooked vegetables, fresh fruits except bananas, or bran cereals.    Limit caffeine and chocolate.    Limit dairy.    Don t use spices or seasonings except salt.    Go back to your normal diet over time, as you feel better and your symptoms improve.    If the symptoms come back, go back to a simple diet or clear liquids.  Follow-up care  Follow up with your healthcare provider, or as advised. If a stool sample was taken or cultures were done, call the healthcare provider for the results as instructed.  Call 911  Call 911 if you have any of these symptoms:    Trouble breathing    Confusion    Extreme drowsiness or trouble walking    Loss of consciousness    Rapid heart rate    Chest pain    Stiff neck    Seizure  When to seek medical advice  Call your healthcare provider right away if any of these occur:    Abdominal pain that gets worse    Constant lower right abdominal pain    Continued vomiting and inability to keep liquids down    Diarrhea more than 5 times a day    Blood in vomit or stool    Dark urine or no urine for 8 hours, dry mouth and tongue, tiredness, weakness, or dizziness    Drowsiness    New rash    You don t get better in 2 to 3 days    Fever of 100.4 F (38 C) or higher that doesn t get lower with medicine  Date Last Reviewed: 1/3/2016    4540-9110 [employer]. [address] [place] [address]  [place] [address]. All rights reserved. This information is not intended as a substitute for professional medical care. Always follow your healthcare professional's instructions.

## 2017-08-09 NOTE — ED PROVIDER NOTES
History     Chief Complaint:  Nausea, Vomiting, & Diarrhea     HPI   Zelda Paulson is a 22 year old female who presents to the emergency department today for evaluation of nausea, vomiting and diarrhea. The patient reports that she has had a few days of nausea, vomiting and worsening diarrhea. She has not vomited today but had 5 episodes of diarrhea in one hour when she woke up, therefore she presents to the emergency department for evaluation of dehydration. She reports some abdominal pain on the right side that radiates to her back. The patient was seen in the emergency department yesterday and an ultrasound and CT were done as per below. The patient denies any blood in her stool or vomit, recent travel, recent antibiotic use or known sick contacts. 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 difficile infection.    [facility] Emergency Department - 8/8/2017    US Abdomen Limited    1. Diffuse fatty infiltration of the liver.  2. Question of mid right renal nonobstructive and nonshadowing stones.  Reading per radiology     CT Abdomen Pelvis w Contrast  1. There is some nonspecific fluid in the colon which may be due to  enteritis.  2. No other cause of acute abdominal pain identified.  Reading per radiology    Allergies:  Fentanyl  Morphine  Sulfa Drugs     Medications:    norethindrone-ethinyl estradiol (JUNEL FE 1/20) 1-20 MG-MCG per tablet  LEVOTHYROXINE SODIUM PO  metFORMIN (GLUCOPHAGE) 500 MG tablet  albuterol (PROAIR HFA/PROVENTIL HFA/VENTOLIN HFA) 108 (90 BASE) MCG/ACT Inhaler    Past Medical History:    Chronic kidney disease   Uncomplicated asthma     Past Surgical History:    Appendectomy  Tonsillectomy    Family History:    History reviewed. No pertinent family history.     Social History:  The patient was accompanied to the ED by her mother.  Smoking Status: Never Smoker  Smokeless Tobacco: Never Used  Alcohol Use: Yes   Marital Status:     "    Review of Systems   Constitutional: Negative for fever.   Gastrointestinal: Positive for abdominal pain, diarrhea, nausea and vomiting (denies hematemesis). Negative for blood in stool and constipation.   All other systems reviewed and are negative.    Physical Exam   First Vitals:  BP: 134/57  Heart Rate: 85  Temp: 98.1  F (36.7  C)  Height: 157.5 cm (5' 2\")  Weight: 96.6 kg (213 lb)  SpO2: 98 %    Physical Exam  Nursing note and vitals reviewed.  Constitutional:  Appears well-developed and well-nourished, comfortable.   HENT:   Head:   No evidence of facial or scalp trauma.  Mouth/Throat:  Mucosa is moist.  Eyes:    Conjunctivae are normal.      Pupils are equal, round, and reactive to light.      Right eye exhibits no discharge. Left eye exhibits no discharge.      No scleral icterus.   Cardiovascular:  Normal rate, regular rhythm.      Normal heart sounds and intact distal pulses.       No murmur heard.  Pulmonary/Chest:  Effort normal and breath sounds normal. No respiratory distress.     No wheezes. No rales. No chest wall tenderness. No stridor.   Abdominal:   Soft. No distension and no mass.      Mild right-sided upper abdominal tenderness with negative Dickey's sigb.     No rebound and no guarding. No flank pain.  Musculoskeletal:  Normal range of motion.      No edema and no tenderness.                                       Neck supple, no midline cervical tenderness.   Neurological:   Alert and oriented to person, place, and year.      No cranial nerve deficit.      Exhibits normal muscle tone. Coordination normal.      GCS eye subscore is 4. GCS verbal subscore is 5.      GCS motor subscore is 6.   Skin:    Skin is warm and dry. No rash noted. No diaphoresis.      No erythema. No pallor.   Psychiatric:   Behavior is normal. Judgment and thought content normal.     Emergency Department Course     Laboratory:  Laboratory findings were communicated with the patient who voiced understanding of the " findings.  CBC: AWNL. (WBC 5.9, HGB 13.0, )   CMP: Calcium 8.2 (L), Albumin 3.3 (L). ALT 55 (H) o/w WNL (Creatinine 0.77)  Lipase: 93     Interventions:  1639: Zofran 4mg IV  1641: NS 2,000mL IV   1707: Lomotil 2.5-0.025 mg 2 tablet PO     Emergency Department Course:  Nursing notes and vitals reviewed.  1630: I performed an exam of the patient as documented above.   IV was inserted and blood was drawn for laboratory testing, results above.  1658: Patient rechecked and updated.    I discussed the treatment plan with the patient. They expressed understanding of this plan and consented to discharge. They will be discharged home with instructions for care and follow up. In addition, the patient will return to the emergency department if their symptoms persist, worsen, if new symptoms arise or if there is any concern.  All questions were answered.   I personally reviewed the laboratory results with the Patient and answered all related questions prior to discharge.    Impression & Plan      Medical Decision Making:  The patient comes in after being seen here yesterday for vomiting and diarrhea. She is no longer vomiting, just had diarrhea today. She did not look terribly dehydrated, blood pressure is good and she was not tachycardic. She had some mild right upper quadrant abdominal tenderness but she had an ultrasound yesterday, which was negative. She had a CT scan of the abdomen which showed maybe some enteritis with some fluid in the colon but otherwise normal. I suspect this is viral. 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 I am going to have her see her doctor on Friday before she can go back to work. It is possible that this is some type of enteric pathogen and she has had no blood or fevers. Her white count is normal today again as are all of her labs. Her ALT was slightly elevated at 55. She was given 2 liters of fluid here, Zofran for nausea and Lomotil for a small diarrhea stool she had  here. I am going to have her really push fluids at home, use some Imodium and follow up in the clinic on Friday.     Diagnosis:    ICD-10-CM    1. Enteritis K52.9    2. Diarrhea, unspecified type R19.7      Disposition:  Fluids but hold dairy products for 24 hours, bland diet and advance as tolerated. Zofran as needed for nausea, over-the-counter Imodium as needed for diarrhea.  Follow-up in the clinic in the next 1-2 days if not improving. Return to the emergency room if you develop fevers with bloody stools.    Scribe Disclosure:  I, Bina Ignacio, am serving as a scribe at 5:06 PM on 8/9/2017 to document services personally performed by Rosmery Duffy MD based on my observations and the provider's statements to me.    8/9/2017    EMERGENCY DEPARTMENT       Rosmery Duffy MD  08/09/17 0387

## 2017-08-09 NOTE — ED AVS SNAPSHOT
Emergency Department    [address] [facility] [address]    Phone:  [phone]    Fax:  [phone]                                       Zelda Paulson   MRN: [id number]    Department:   Emergency Department   Date of Visit:  8/9/2017           Patient Information     Date Of Birth          1995        Your diagnoses for this visit were:     Enteritis     Diarrhea, unspecified type        You were seen by Rosmery Duffy MD.      Follow-up Information     Schedule an appointment as soon as possible for a visit with Jasmin Alonso.    Specialty:  Family Practice    Why:  If symptoms worsen    Contact information:    [facility]  [address]  [facility] [address]  [phone]          Discharge Instructions       Fluids but hold dairy products for 24 hours, bland diet and advance as tolerated. Zofran as needed for nausea, over-the-counter Imodium as needed for diarrhea.  Follow-up in the clinic in the next 1-2 days if not improving. Return to the emergency room if you develop fevers with bloody stools.        Discharge References/Attachments     VOMITING AND DIARRHEA, SELF-CARE FOR (ENGLISH)    FOOD POISONING OR GASTROENTERITIS (ADULT) (ENGLISH)      Future Appointments        Provider Department Dept Phone Center    8/16/2017 10:00 AM Mackenzie Garrido, PhD [facility] [phone] [facility]    8/23/2017 2:00 PM Mackenzie Garrido, PhD [facility] [phone] [facility]    8/28/2017 1:30 PM  [facility] [address] [facility] [phone] [facility]    9/20/2017 2:00 PM Mackenzie Garrido, PhD [facility] [phone] [facility]      24 Hour Appointment Hotline       To make an appointment at any [facility], call [phone] ([phone]). If you don't have a family doctor or clinic, we  will help you find one. [facility] are conveniently located to serve the needs of you and your family.             Review of your medicines      Our records show that you are taking the medicines listed below. If these are incorrect, please call your family doctor or clinic.        Dose / Directions Last dose taken    albuterol 108 (90 BASE) MCG/ACT Inhaler   Commonly known as:  PROAIR HFA/PROVENTIL HFA/VENTOLIN HFA   Dose:  2 puff        Inhale 2 puffs into the lungs every 4 hours as needed for shortness of breath / dyspnea or wheezing   Refills:  0        HYDROcodone-acetaminophen 5-325 MG per tablet   Commonly known as:  NORCO   Dose:  1-2 tablet   Quantity:  15 tablet        Take 1-2 tablets by mouth every 4 hours as needed for moderate to severe pain   Refills:  0        LEVOTHYROXINE SODIUM PO   Dose:  25 mcg        Take 25 mcg by mouth daily   Refills:  0        metFORMIN 500 MG tablet   Commonly known as:  GLUCOPHAGE   Dose:  500 mg        Take 500 mg by mouth 2 times daily (with meals)   Refills:  0        norethindrone-ethinyl estradiol 1-20 MG-MCG per tablet   Commonly known as:  JUNEL FE 1/20   Dose:  1 tablet        Take 1 tablet by mouth daily   Refills:  0        ondansetron 4 MG ODT tab   Commonly known as:  ZOFRAN ODT   Dose:  4 mg   Quantity:  10 tablet        Take 1 tablet (4 mg) by mouth every 8 hours as needed for nausea   Refills:  0                Procedures and tests performed during your visit     CBC with platelets differential    Comprehensive metabolic panel    Lipase    Peripheral IV: Standard    Vital signs      Orders Needing Specimen Collection     None      Pending Results     No orders found from 8/7/2017 to 8/10/2017.            Pending Culture Results     No orders found from 8/7/2017 to 8/10/2017.            Pending Results Instructions     If you had any lab results that were not finalized at the time of your Discharge, you can call the ED Lab Result RN at [phone].  You will be contacted by this team for any positive Lab results or changes in treatment. The nurses are available 7 days a week from 10A to 6:30P.  You can leave a message 24 hours per day and they will return your call.        Test Results From Your Hospital Stay        8/9/2017  3:59 PM      Component Results     Component Value Ref Range & Units Status    WBC 5.9 4.0 - 11.0 10e9/L Final    RBC Count 4.67 3.8 - 5.2 10e12/L Final    Hemoglobin 13.0 11.7 - 15.7 g/dL Final    Hematocrit 39.9 35.0 - 47.0 % Final    MCV 85 78 - 100 fl Final    MCH 27.8 26.5 - 33.0 pg Final    MCHC 32.6 31.5 - 36.5 g/dL Final    RDW 13.1 10.0 - 15.0 % Final    Platelet Count 224 150 - 450 10e9/L Final    Diff Method Automated Method  Final    % Neutrophils 61.3 % Final    % Lymphocytes 28.2 % Final    % Monocytes 9.6 % Final    % Eosinophils 0.7 % Final    % Basophils 0.2 % Final    % Immature Granulocytes 0.0 % Final    Nucleated RBCs 0 0 /100 Final    Absolute Neutrophil 3.6 1.6 - 8.3 10e9/L Final    Absolute Lymphocytes 1.7 0.8 - 5.3 10e9/L Final    Absolute Monocytes 0.6 0.0 - 1.3 10e9/L Final    Absolute Eosinophils 0.0 0.0 - 0.7 10e9/L Final    Absolute Basophils 0.0 0.0 - 0.2 10e9/L Final    Abs Immature Granulocytes 0.0 0 - 0.4 10e9/L Final    Absolute Nucleated RBC 0.0  Final         8/9/2017  4:24 PM      Component Results     Component Value Ref Range & Units Status    Sodium 139 133 - 144 mmol/L Final    Potassium 3.5 3.4 - 5.3 mmol/L Final    Chloride 104 94 - 109 mmol/L Final    Carbon Dioxide 26 20 - 32 mmol/L Final    Anion Gap 9 3 - 14 mmol/L Final    Glucose 73 70 - 99 mg/dL Final    Urea Nitrogen 11 7 - 30 mg/dL Final    Creatinine 0.77 0.52 - 1.04 mg/dL Final    GFR Estimate >90  Non  GFR Calc   >60 mL/min/1.7m2 Final    GFR Estimate If Black >90   GFR Calc   >60 mL/min/1.7m2 Final    Calcium 8.2 (L) 8.5 - 10.1 mg/dL Final    Bilirubin Total 0.2 0.2 - 1.3 mg/dL Final    Albumin 3.3 (L)  3.4 - 5.0 g/dL Final    Protein Total 6.9 6.8 - 8.8 g/dL Final    Alkaline Phosphatase 70 40 - 150 U/L Final    ALT 55 (H) 0 - 50 U/L Final    AST 37 0 - 45 U/L Final         8/9/2017  4:22 PM      Component Results     Component Value Ref Range & Units Status    Lipase 93 73 - 393 U/L Final                Clinical Quality Measure: Blood Pressure Screening     Your blood pressure was checked while you were in the emergency department today. The last reading we obtained was  BP: 134/57 . Please read the guidelines below about what these numbers mean and what you should do about them.  If your systolic blood pressure (the top number) is less than 120 and your diastolic blood pressure (the bottom number) is less than 80, then your blood pressure is normal. There is nothing more that you need to do about it.  If your systolic blood pressure (the top number) is 120-139 or your diastolic blood pressure (the bottom number) is 80-89, your blood pressure may be higher than it should be. You should have your blood pressure rechecked within a year by a primary care provider.  If your systolic blood pressure (the top number) is 140 or greater or your diastolic blood pressure (the bottom number) is 90 or greater, you may have high blood pressure. High blood pressure is treatable, but if left untreated over time it can put you at risk for heart attack, stroke, or kidney failure. You should have your blood pressure rechecked by a primary care provider within the next 4 weeks.  If your provider in the emergency department today gave you specific instructions to follow-up with your doctor or provider even sooner than that, you should follow that instruction and not wait for up to 4 weeks for your follow-up visit.        Thank you for choosing [facility]       Thank you for choosing [facility] for your care. Our goal is always to provide you with excellent care. Hearing back from our patients is one way we can continue to improve our  services. Please take a few minutes to complete the written survey that you may receive in the mail after you visit with us. Thank you!        [employer] Information     [employer] gives you secure access to your electronic health record. If you see a primary care provider, you can also send messages to your care team and make appointments. If you have questions, please call your primary care clinic.  If you do not have a primary care provider, please call [phone] and they will assist you.        Care EveryWhere ID     This is your Care EveryWhere ID. This could be used by other organizations to access your [facility] medical records  [id number]        Equal Access to Services     DANIEL SORIANO : Merle Henderson, david estrada, reymundo sampson . So [facility] [phone].    ATENCIÓN: Si habla español, tiene a shah disposición servicios gratuitos de asistencia lingüística. Llame al [phone].    We comply with applicable federal civil rights laws and [place] laws. We do not discriminate on the basis of race, color, national origin, age, disability sex, sexual orientation or gender identity.            After Visit Summary       This is your record. Keep this with you and show to your community pharmacist(s) and doctor(s) at your next visit.

## 2017-08-09 NOTE — DISCHARGE INSTRUCTIONS
Fluids but hold dairy products for 24 hours, bland diet and advance as tolerated. Zofran as needed for nausea, over-the-counter Imodium as needed for diarrhea.  Follow-up in the clinic in the next 1-2 days if not improving. Return to the emergency room if you develop fevers with bloody stools.

## 2017-08-16 ENCOUNTER — FCC EXTENDED DOCUMENTATION (OUTPATIENT)
Dept: PSYCHOLOGY | Facility: CLINIC | Age: 22
End: 2017-08-16

## 2017-08-16 ENCOUNTER — OFFICE VISIT (OUTPATIENT)
Dept: PSYCHOLOGY | Facility: CLINIC | Age: 22
End: 2017-08-16
Payer: COMMERCIAL

## 2017-08-16 DIAGNOSIS — F54 PSYCHOLOGICAL FACTOR AFFECTING PHYSICAL CONDITION: Primary | ICD-10-CM

## 2017-08-16 PROCEDURE — 90834 PSYTX W PT 45 MINUTES: CPT | Performed by: PSYCHOLOGIST

## 2017-08-16 ASSESSMENT — PATIENT HEALTH QUESTIONNAIRE - PHQ9
5. POOR APPETITE OR OVEREATING: NOT AT ALL
SUM OF ALL RESPONSES TO PHQ QUESTIONS 1-9: 4

## 2017-08-16 ASSESSMENT — ANXIETY QUESTIONNAIRES
3. WORRYING TOO MUCH ABOUT DIFFERENT THINGS: SEVERAL DAYS
5. BEING SO RESTLESS THAT IT IS HARD TO SIT STILL: NOT AT ALL
2. NOT BEING ABLE TO STOP OR CONTROL WORRYING: NOT AT ALL
6. BECOMING EASILY ANNOYED OR IRRITABLE: SEVERAL DAYS
IF YOU CHECKED OFF ANY PROBLEMS ON THIS QUESTIONNAIRE, HOW DIFFICULT HAVE THESE PROBLEMS MADE IT FOR YOU TO DO YOUR WORK, TAKE CARE OF THINGS AT HOME, OR GET ALONG WITH OTHER PEOPLE: NOT DIFFICULT AT ALL
GAD7 TOTAL SCORE: 3
1. FEELING NERVOUS, ANXIOUS, OR ON EDGE: SEVERAL DAYS
7. FEELING AFRAID AS IF SOMETHING AWFUL MIGHT HAPPEN: NOT AT ALL

## 2017-08-16 NOTE — PROGRESS NOTES
Progress Note - Initial Session    Client Name:  Zelda Paulson Date: 8/16/2017         Service Type: Individual/Bariatric Pre-Surgical Eval Intake      Session Start Time: 10:00  Session End Time: 10:40      Session Length: 40 minutes      Session #: 1     Attendees: Client attended alone       Diagnostic Assessment in progress as part of bariatric evaluation. Gathered history and processed issues regarding weight, eating habits, weight management, and medical history related to weight. Understanding of surgical risks and guidelines for post-surgical follow up also reviewed. Unable to complete documentation at the conclusion of the first session. Thus, additional time will be needed to review general psychological, social, and medical history.  Mental Status Assessment:  Appearance:   Appropriate   Eye Contact:   Good   Psychomotor Behavior: Normal   Attitude:   Cooperative   Orientation:   All  Speech   Rate / Production: Normal    Volume:  Normal   Mood:    Normal  Affect:    Appropriate   Thought Content:  Clear   Thought Form:  Coherent  Logical   Insight:    Good       Safety Issues and Plan for Safety and Risk Management:  Client denies current fears or concerns for personal safety.  Client denies current or recent suicidal ideation or behaviors.  Client denies current or recent homicidal ideation or behaviors.  Client denies current or recent self injurious behavior or ideation.  Client denies other safety concerns.  A safety and risk management plan has not been developed at this time, however client was given the after-hours number / 911 should there be a change in any of these risk factors.  Client reports there are no firearms in the house.      Diagnostic Criteria:    A. A medical symptom or condition (other than a mental disorder is present)  B. Psychological or behavioral factors adversely affect the medical condition in one of the following ways   -The factors interfere with the  treatment of the medical condition  C. The psychological and behavioral factors in Criterion B are not better explained by another mental disorder      DSM5 Diagnoses: (Sustained by DSM5 Criteria Listed Above)  Diagnoses: Psychological Factors Affecting Medical Condition of Obesity (F54.0)   Psychosocial & Contextual Factors: Client reported she started gaining weight in high school when she began using a specific kind of birth control. She explained that she has been diagnosed with PCOS and has struggled to get pregnant. She reported that she would like to have bariatric surgery to improve fertility and to lose weight and improve overall health.  WHODAS 2.0 (12 item)            This questionnaire asks about difficulties due to health conditions. Health conditions  include  disease or illnesses, other health problems that may be short or long lasting,  injuries, mental health or emotional problems, and problems with alcohol or drugs.                     Think back over the past 30 days and answer these questions, thinking about how much  difficulty you had doing the following activities. For each question, please [place] only  one response.    S1 Standing for long periods such as 30 minutes? None =         1   S2 Taking care of household responsibilities? None =         1   S3 Learning a new task, for example, learning how to get to a new place? None =         1   S4 How much of a problem do you have joining community activities (for example, festivals, [employer] or other activities) in the same way as anyone else can? None =         1   S5 How much have you been emotionally affected by your health problems? None =         1     In the past 30 days, how much difficulty did you have in:   S6 Concentrating on doing something for ten minutes? None =         1   S7 Walking a long distance such as a kilometer (or equivalent)? None =         1   S8 Washing your whole body? None =         1   S9 Getting dressed? None =       "   1   S10 Dealing with people you do not know? None =         1   S11 Maintaining a friendship? None =         1   S12 Your day to day work? None =         1     H1 Overall, in the past 30 days, how many days were these difficulties present? Record number of days \"A few\"   H2 In the past 30 days, for how many days were you totally unable to carry out your usual activities or work because of any health condition? Record number of days  0   H3 In the past 30 days, not counting the days that you were totally unable, for how many days did you cut back or reduce your usual activities or work because of any health condition? Record number of days 1-2       Collateral Reports Completed:  Not Applicable      PLAN: (Homework, other):  Client RTC next week to complete DA.      Mackenzie Garrido, PhD, LP      "

## 2017-08-16 NOTE — MR AVS SNAPSHOT
MRN:[id number]                      After Visit Summary   8/16/2017    Zelda Paulson    MRN: [id number]           Visit Information        Provider Department      8/16/2017 10:00 AM Mackenzie aGrrido, PhD [facility] BARIATRICS      Your next 10 appointments already scheduled     Aug 23, 2017  2:00 PM CDT   Return Visit with Mackenzie Garrido, PhD   [facility] ([facility])    [address] [place] [address] [id number] [phone]            Aug 28, 2017  1:30 PM CDT   Weight Loss Visit with  Wl Diet 1, RD   [facility] ([facility])    [address] [place] [phone]            Sep 20, 2017  2:00 PM CDT   Return Visit with Mackenzie Garrido, PhD   [facility] ([facility])    [address] [place] [address] [id number] [phone]              MyChart Information     [employer] gives you secure access to your electronic health record. If you see a primary care provider, you can also send messages to your care team and make appointments. If you have questions, please call your primary care clinic.  If you do not have a primary care provider, please call [phone] and they will assist you.        Care EveryWhere ID     This is your Care EveryWhere ID. This could be used by other organizations to access your [facility] medical records  [id number]        Equal Access to Services     DANIEL SORIANO : Hadii lucy cheek hadasho Sogogoali, waaxda luqadaha, qaybta kaalmada adeegyada, reymundo idiolvin ybarra. So [facility] [phone].    ATENCIÓN: Si habla español, tiene a shah disposición servicios gratuitos de asistencia lingüística. Llame al [phone].    We comply with applicable federal civil rights laws and [place] laws. We  do not discriminate on the basis of race, color, national origin, age, disability sex, sexual orientation or gender identity.

## 2017-08-17 ASSESSMENT — ANXIETY QUESTIONNAIRES: GAD7 TOTAL SCORE: 3

## 2017-08-22 ENCOUNTER — DOCUMENTATION ONLY (OUTPATIENT)
Dept: PSYCHOLOGY | Facility: CLINIC | Age: 22
End: 2017-08-22
Payer: COMMERCIAL

## 2017-08-22 DIAGNOSIS — F54 PSYCHOLOGICAL FACTOR AFFECTING PHYSICAL CONDITION: Primary | ICD-10-CM

## 2017-08-22 PROCEDURE — 96101 HC PSYCHOLOGICAL TEST BY PSYCHOLOGIST/MD, PER HR: CPT | Performed by: PSYCHOLOGIST

## 2017-08-22 NOTE — PROGRESS NOTES
Client Name: Zelda Paulson  MRN: [id number]  : 1995    Client completed the [place] Multiphasic Personality Inventory-2 (MMPI-2), a self-report personality inventory, as part of her evaluation. Validity scales indicate that the client responded in an open and consistent manner, resulting in a valid profile. The following results are likely to be an accurate reflection of client's current functioning. Client s responses suggest that she is reporting low levels of general emotional distress. Individuals with similar profiles tend to generally see themselves as well adjusted and as sufficiently able to cope with the difficulties they face that they feel little or no need to draw attention to them.

## 2017-08-23 ENCOUNTER — OFFICE VISIT (OUTPATIENT)
Dept: PSYCHOLOGY | Facility: CLINIC | Age: 22
End: 2017-08-23
Payer: COMMERCIAL

## 2017-08-23 DIAGNOSIS — F54 PSYCHOLOGICAL FACTOR AFFECTING PHYSICAL CONDITION: Primary | ICD-10-CM

## 2017-08-23 PROCEDURE — 90791 PSYCH DIAGNOSTIC EVALUATION: CPT | Performed by: PSYCHOLOGIST

## 2017-08-23 NOTE — MR AVS SNAPSHOT
MRN:[id number]                      After Visit Summary   8/23/2017    Zelda Paulson    MRN: [id number]           Visit Information        Provider Department      8/23/2017 2:00 PM Mackenzie Ferreira, PhD [facility] BARIATRICS      Your next 10 appointments already scheduled     Aug 28, 2017  1:30 PM CDT   Weight Loss Visit with  Wl Diet 1, RD   [facility] [place] ([facility])    [address] [facility] [address]  [place] [address] [phone]            Sep 21, 2017  4:00 PM CDT   Return Visit with Mackenzie Ferreira, PhD   [facility] ([facility])    [address] [place] [id number] [phone]              MyChart Information     [employer] gives you secure access to your electronic health record. If you see a primary care provider, you can also send messages to your care team and make appointments. If you have questions, please call your primary care clinic.  If you do not have a primary care provider, please call [phone] and they will assist you.        Care EveryWhere ID     This is your Care EveryWhere ID. This could be used by other organizations to access your [facility] medical records  [id number]        Equal Access to Services     DANIEL SORIANO AH: Hadii lucy gottliebo Sogogoali, waaxda luqadaha, qaybta kaalmada adeegyaabbe, reymundo ybarra. So [facility] [phone].    ATENCIÓN: Si habla español, tiene a shah disposición servicios gratmeaghanos de asistencia lingüística. Llame al [phone].    We comply with applicable federal civil rights laws and [place] laws. We do not discriminate on the basis of race, color, national origin, age, disability sex, sexual orientation or gender identity.

## 2017-08-23 NOTE — Clinical Note
Branden Alva,  Here is the DA for Zelda's bariatric pre-surgical evaluation. We will meet again in one month to check in regarding goals with lifestyle/behavior changes. Please let me know if you have questions or concerns.  Thank you! Mackenzie Ferreira
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
## 2018-01-21 ENCOUNTER — HEALTH MAINTENANCE LETTER (OUTPATIENT)
Age: 23
End: 2018-01-21

## 2018-04-27 ENCOUNTER — ANESTHESIA EVENT (OUTPATIENT)
Dept: OBGYN | Facility: CLINIC | Age: 23
End: 2018-04-27
Payer: COMMERCIAL

## 2018-04-27 ENCOUNTER — APPOINTMENT (OUTPATIENT)
Dept: ULTRASOUND IMAGING | Facility: CLINIC | Age: 23
End: 2018-04-27
Attending: OBSTETRICS & GYNECOLOGY
Payer: COMMERCIAL

## 2018-04-27 ENCOUNTER — ANESTHESIA (OUTPATIENT)
Dept: OBGYN | Facility: CLINIC | Age: 23
End: 2018-04-27
Payer: COMMERCIAL

## 2018-04-27 ENCOUNTER — HOSPITAL ENCOUNTER (INPATIENT)
Facility: CLINIC | Age: 23
LOS: 3 days | Discharge: HOME HEALTH | End: 2018-04-30
Attending: OBSTETRICS & GYNECOLOGY | Admitting: OBSTETRICS & GYNECOLOGY
Payer: COMMERCIAL

## 2018-04-27 DIAGNOSIS — O34.219 PREVIOUS CESAREAN DELIVERY, ANTEPARTUM CONDITION OR COMPLICATION: ICD-10-CM

## 2018-04-27 PROBLEM — Z34.90 PREGNANCY: Status: ACTIVE | Noted: 2018-04-27

## 2018-04-27 LAB
ABO + RH BLD: NORMAL
ABO + RH BLD: NORMAL
BLD GP AB SCN SERPL QL: NORMAL
BLOOD BANK CMNT PATIENT-IMP: NORMAL
ERYTHROCYTE [DISTWIDTH] IN BLOOD BY AUTOMATED COUNT: 13.4 % (ref 10–15)
HCT VFR BLD AUTO: 30.3 % (ref 35–47)
HGB BLD-MCNC: 9.7 G/DL (ref 11.7–15.7)
MCH RBC QN AUTO: 26.7 PG (ref 26.5–33)
MCHC RBC AUTO-ENTMCNC: 32 G/DL (ref 31.5–36.5)
MCV RBC AUTO: 84 FL (ref 78–100)
PLATELET # BLD AUTO: 198 10E9/L (ref 150–450)
RBC # BLD AUTO: 3.63 10E12/L (ref 3.8–5.2)
SPECIMEN EXP DATE BLD: NORMAL
WBC # BLD AUTO: 10.4 10E9/L (ref 4–11)

## 2018-04-27 PROCEDURE — 86901 BLOOD TYPING SEROLOGIC RH(D): CPT | Performed by: OBSTETRICS & GYNECOLOGY

## 2018-04-27 PROCEDURE — 71000013 ZZH RECOVERY PHASE 1 LEVEL 1 EA ADDTL HR: Performed by: OBSTETRICS & GYNECOLOGY

## 2018-04-27 PROCEDURE — 88307 TISSUE EXAM BY PATHOLOGIST: CPT | Performed by: OBSTETRICS & GYNECOLOGY

## 2018-04-27 PROCEDURE — 86900 BLOOD TYPING SEROLOGIC ABO: CPT | Performed by: OBSTETRICS & GYNECOLOGY

## 2018-04-27 PROCEDURE — 88307 TISSUE EXAM BY PATHOLOGIST: CPT | Mod: 26 | Performed by: OBSTETRICS & GYNECOLOGY

## 2018-04-27 PROCEDURE — 36415 COLL VENOUS BLD VENIPUNCTURE: CPT | Performed by: OBSTETRICS & GYNECOLOGY

## 2018-04-27 PROCEDURE — 25000132 ZZH RX MED GY IP 250 OP 250 PS 637

## 2018-04-27 PROCEDURE — 25000125 ZZHC RX 250: Performed by: NURSE ANESTHETIST, CERTIFIED REGISTERED

## 2018-04-27 PROCEDURE — 25000125 ZZHC RX 250

## 2018-04-27 PROCEDURE — 12000037 ZZH R&B POSTPARTUM INTERMEDIATE

## 2018-04-27 PROCEDURE — 76819 FETAL BIOPHYS PROFIL W/O NST: CPT

## 2018-04-27 PROCEDURE — 25000128 H RX IP 250 OP 636: Performed by: NURSE ANESTHETIST, CERTIFIED REGISTERED

## 2018-04-27 PROCEDURE — 36000058 ZZH SURGERY LEVEL 3 EA 15 ADDTL MIN: Performed by: OBSTETRICS & GYNECOLOGY

## 2018-04-27 PROCEDURE — 37000008 ZZH ANESTHESIA TECHNICAL FEE, 1ST 30 MIN: Performed by: OBSTETRICS & GYNECOLOGY

## 2018-04-27 PROCEDURE — 36000056 ZZH SURGERY LEVEL 3 1ST 30 MIN: Performed by: OBSTETRICS & GYNECOLOGY

## 2018-04-27 PROCEDURE — 25000128 H RX IP 250 OP 636: Performed by: OBSTETRICS & GYNECOLOGY

## 2018-04-27 PROCEDURE — 37000009 ZZH ANESTHESIA TECHNICAL FEE, EACH ADDTL 15 MIN: Performed by: OBSTETRICS & GYNECOLOGY

## 2018-04-27 PROCEDURE — 86850 RBC ANTIBODY SCREEN: CPT | Performed by: OBSTETRICS & GYNECOLOGY

## 2018-04-27 PROCEDURE — 25000128 H RX IP 250 OP 636

## 2018-04-27 PROCEDURE — 71000012 ZZH RECOVERY PHASE 1 LEVEL 1 FIRST HR: Performed by: OBSTETRICS & GYNECOLOGY

## 2018-04-27 PROCEDURE — 85027 COMPLETE CBC AUTOMATED: CPT | Performed by: OBSTETRICS & GYNECOLOGY

## 2018-04-27 PROCEDURE — 86780 TREPONEMA PALLIDUM: CPT | Performed by: OBSTETRICS & GYNECOLOGY

## 2018-04-27 PROCEDURE — 27210794 ZZH OR GENERAL SUPPLY STERILE: Performed by: OBSTETRICS & GYNECOLOGY

## 2018-04-27 RX ORDER — KETOROLAC TROMETHAMINE 30 MG/ML
INJECTION, SOLUTION INTRAMUSCULAR; INTRAVENOUS
Status: COMPLETED
Start: 2018-04-27 | End: 2018-04-27

## 2018-04-27 RX ORDER — DIPHENHYDRAMINE HYDROCHLORIDE 50 MG/ML
25 INJECTION INTRAMUSCULAR; INTRAVENOUS EVERY 6 HOURS PRN
Status: DISCONTINUED | OUTPATIENT
Start: 2018-04-27 | End: 2018-04-28 | Stop reason: CLARIF

## 2018-04-27 RX ORDER — CEFAZOLIN SODIUM 2 G/100ML
INJECTION, SOLUTION INTRAVENOUS
Status: DISCONTINUED
Start: 2018-04-27 | End: 2018-04-27 | Stop reason: HOSPADM

## 2018-04-27 RX ORDER — DEXTROSE, SODIUM CHLORIDE, SODIUM LACTATE, POTASSIUM CHLORIDE, AND CALCIUM CHLORIDE 5; .6; .31; .03; .02 G/100ML; G/100ML; G/100ML; G/100ML; G/100ML
INJECTION, SOLUTION INTRAVENOUS CONTINUOUS
Status: DISCONTINUED | OUTPATIENT
Start: 2018-04-27 | End: 2018-04-28 | Stop reason: CLARIF

## 2018-04-27 RX ORDER — OXYTOCIN/0.9 % SODIUM CHLORIDE 30/500 ML
PLASTIC BAG, INJECTION (ML) INTRAVENOUS CONTINUOUS PRN
Status: DISCONTINUED | OUTPATIENT
Start: 2018-04-27 | End: 2018-04-27

## 2018-04-27 RX ORDER — BISACODYL 10 MG
10 SUPPOSITORY, RECTAL RECTAL DAILY PRN
Status: DISCONTINUED | OUTPATIENT
Start: 2018-04-29 | End: 2018-04-30 | Stop reason: HOSPADM

## 2018-04-27 RX ORDER — DIPHENHYDRAMINE HCL 25 MG
25 CAPSULE ORAL EVERY 6 HOURS PRN
Status: DISCONTINUED | OUTPATIENT
Start: 2018-04-27 | End: 2018-04-28 | Stop reason: CLARIF

## 2018-04-27 RX ORDER — ONDANSETRON 4 MG/1
4 TABLET, ORALLY DISINTEGRATING ORAL EVERY 6 HOURS PRN
Status: DISCONTINUED | OUTPATIENT
Start: 2018-04-27 | End: 2018-04-28 | Stop reason: CLARIF

## 2018-04-27 RX ORDER — DIPHENHYDRAMINE HYDROCHLORIDE 50 MG/ML
25 INJECTION INTRAMUSCULAR; INTRAVENOUS EVERY 6 HOURS PRN
Status: DISCONTINUED | OUTPATIENT
Start: 2018-04-27 | End: 2018-04-30 | Stop reason: HOSPADM

## 2018-04-27 RX ORDER — ACETAMINOPHEN 325 MG/1
650 TABLET ORAL EVERY 4 HOURS PRN
Status: DISCONTINUED | OUTPATIENT
Start: 2018-04-30 | End: 2018-04-28

## 2018-04-27 RX ORDER — CITRIC ACID/SODIUM CITRATE 334-500MG
30 SOLUTION, ORAL ORAL
Status: COMPLETED | OUTPATIENT
Start: 2018-04-27 | End: 2018-04-27

## 2018-04-27 RX ORDER — NALOXONE HYDROCHLORIDE 0.4 MG/ML
.1-.4 INJECTION, SOLUTION INTRAMUSCULAR; INTRAVENOUS; SUBCUTANEOUS
Status: DISCONTINUED | OUTPATIENT
Start: 2018-04-27 | End: 2018-04-28 | Stop reason: CLARIF

## 2018-04-27 RX ORDER — MISOPROSTOL 200 UG/1
400 TABLET ORAL
Status: DISCONTINUED | OUTPATIENT
Start: 2018-04-27 | End: 2018-04-30 | Stop reason: HOSPADM

## 2018-04-27 RX ORDER — ACETAMINOPHEN 325 MG/1
975 TABLET ORAL EVERY 8 HOURS
Status: DISCONTINUED | OUTPATIENT
Start: 2018-04-27 | End: 2018-04-28

## 2018-04-27 RX ORDER — HYDROMORPHONE HYDROCHLORIDE 1 MG/ML
INJECTION, SOLUTION INTRAMUSCULAR; INTRAVENOUS; SUBCUTANEOUS
Status: COMPLETED
Start: 2018-04-27 | End: 2018-04-27

## 2018-04-27 RX ORDER — CEFAZOLIN SODIUM 1 G/3ML
1 INJECTION, POWDER, FOR SOLUTION INTRAMUSCULAR; INTRAVENOUS SEE ADMIN INSTRUCTIONS
Status: DISCONTINUED | OUTPATIENT
Start: 2018-04-27 | End: 2018-04-27 | Stop reason: HOSPADM

## 2018-04-27 RX ORDER — ONDANSETRON 2 MG/ML
4 INJECTION INTRAMUSCULAR; INTRAVENOUS EVERY 6 HOURS PRN
Status: DISCONTINUED | OUTPATIENT
Start: 2018-04-27 | End: 2018-04-28 | Stop reason: CLARIF

## 2018-04-27 RX ORDER — CEFAZOLIN SODIUM 2 G/100ML
2 INJECTION, SOLUTION INTRAVENOUS
Status: COMPLETED | OUTPATIENT
Start: 2018-04-27 | End: 2018-04-27

## 2018-04-27 RX ORDER — LIDOCAINE 40 MG/G
CREAM TOPICAL
Status: DISCONTINUED | OUTPATIENT
Start: 2018-04-27 | End: 2018-04-30 | Stop reason: HOSPADM

## 2018-04-27 RX ORDER — CITRIC ACID/SODIUM CITRATE 334-500MG
SOLUTION, ORAL ORAL
Status: COMPLETED
Start: 2018-04-27 | End: 2018-04-27

## 2018-04-27 RX ORDER — OXYCODONE HYDROCHLORIDE 5 MG/1
5-10 TABLET ORAL
Status: DISCONTINUED | OUTPATIENT
Start: 2018-04-27 | End: 2018-04-30 | Stop reason: HOSPADM

## 2018-04-27 RX ORDER — OXYTOCIN 10 [USP'U]/ML
10 INJECTION, SOLUTION INTRAMUSCULAR; INTRAVENOUS
Status: DISCONTINUED | OUTPATIENT
Start: 2018-04-27 | End: 2018-04-30 | Stop reason: HOSPADM

## 2018-04-27 RX ORDER — ACETAMINOPHEN 325 MG/1
TABLET ORAL
Status: COMPLETED
Start: 2018-04-27 | End: 2018-04-27

## 2018-04-27 RX ORDER — LANOLIN 100 %
OINTMENT (GRAM) TOPICAL
Status: DISCONTINUED | OUTPATIENT
Start: 2018-04-27 | End: 2018-04-30 | Stop reason: HOSPADM

## 2018-04-27 RX ORDER — SIMETHICONE 80 MG
80 TABLET,CHEWABLE ORAL 4 TIMES DAILY PRN
Status: DISCONTINUED | OUTPATIENT
Start: 2018-04-27 | End: 2018-04-30 | Stop reason: HOSPADM

## 2018-04-27 RX ORDER — OXYTOCIN/0.9 % SODIUM CHLORIDE 30/500 ML
PLASTIC BAG, INJECTION (ML) INTRAVENOUS
Status: COMPLETED
Start: 2018-04-27 | End: 2018-04-27

## 2018-04-27 RX ORDER — ONDANSETRON 2 MG/ML
INJECTION INTRAMUSCULAR; INTRAVENOUS PRN
Status: DISCONTINUED | OUTPATIENT
Start: 2018-04-27 | End: 2018-04-27

## 2018-04-27 RX ORDER — ONDANSETRON 2 MG/ML
4 INJECTION INTRAMUSCULAR; INTRAVENOUS EVERY 6 HOURS PRN
Status: DISCONTINUED | OUTPATIENT
Start: 2018-04-27 | End: 2018-04-30 | Stop reason: HOSPADM

## 2018-04-27 RX ORDER — HYDROMORPHONE HYDROCHLORIDE 1 MG/ML
1 SOLUTION ORAL ONCE
Status: DISCONTINUED | OUTPATIENT
Start: 2018-04-27 | End: 2018-04-27

## 2018-04-27 RX ORDER — HYDROCORTISONE 2.5 %
CREAM (GRAM) TOPICAL 3 TIMES DAILY PRN
Status: DISCONTINUED | OUTPATIENT
Start: 2018-04-27 | End: 2018-04-30 | Stop reason: HOSPADM

## 2018-04-27 RX ORDER — AMOXICILLIN 250 MG
1 CAPSULE ORAL 2 TIMES DAILY PRN
Status: DISCONTINUED | OUTPATIENT
Start: 2018-04-27 | End: 2018-04-30 | Stop reason: HOSPADM

## 2018-04-27 RX ORDER — OXYTOCIN/0.9 % SODIUM CHLORIDE 30/500 ML
PLASTIC BAG, INJECTION (ML) INTRAVENOUS
Status: DISCONTINUED
Start: 2018-04-27 | End: 2018-04-27 | Stop reason: HOSPADM

## 2018-04-27 RX ORDER — OXYTOCIN/0.9 % SODIUM CHLORIDE 30/500 ML
100 PLASTIC BAG, INJECTION (ML) INTRAVENOUS CONTINUOUS
Status: DISCONTINUED | OUTPATIENT
Start: 2018-04-27 | End: 2018-04-28 | Stop reason: CLARIF

## 2018-04-27 RX ORDER — AMOXICILLIN 250 MG
2 CAPSULE ORAL 2 TIMES DAILY PRN
Status: DISCONTINUED | OUTPATIENT
Start: 2018-04-27 | End: 2018-04-30 | Stop reason: HOSPADM

## 2018-04-27 RX ORDER — IBUPROFEN 600 MG/1
600 TABLET, FILM COATED ORAL EVERY 6 HOURS PRN
Status: DISCONTINUED | OUTPATIENT
Start: 2018-04-27 | End: 2018-04-30 | Stop reason: HOSPADM

## 2018-04-27 RX ORDER — KETOROLAC TROMETHAMINE 30 MG/ML
15 INJECTION, SOLUTION INTRAMUSCULAR; INTRAVENOUS EVERY 6 HOURS
Status: DISCONTINUED | OUTPATIENT
Start: 2018-04-27 | End: 2018-04-28

## 2018-04-27 RX ORDER — OXYTOCIN/0.9 % SODIUM CHLORIDE 30/500 ML
340 PLASTIC BAG, INJECTION (ML) INTRAVENOUS CONTINUOUS PRN
Status: DISCONTINUED | OUTPATIENT
Start: 2018-04-27 | End: 2018-04-30 | Stop reason: HOSPADM

## 2018-04-27 RX ORDER — IBUPROFEN 400 MG/1
800 TABLET, FILM COATED ORAL EVERY 6 HOURS PRN
Status: DISCONTINUED | OUTPATIENT
Start: 2018-04-27 | End: 2018-04-30 | Stop reason: HOSPADM

## 2018-04-27 RX ORDER — SODIUM CHLORIDE, SODIUM LACTATE, POTASSIUM CHLORIDE, CALCIUM CHLORIDE 600; 310; 30; 20 MG/100ML; MG/100ML; MG/100ML; MG/100ML
INJECTION, SOLUTION INTRAVENOUS CONTINUOUS
Status: DISCONTINUED | OUTPATIENT
Start: 2018-04-27 | End: 2018-04-30 | Stop reason: HOSPADM

## 2018-04-27 RX ORDER — IBUPROFEN 400 MG/1
400 TABLET, FILM COATED ORAL EVERY 6 HOURS PRN
Status: DISCONTINUED | OUTPATIENT
Start: 2018-04-27 | End: 2018-04-30 | Stop reason: HOSPADM

## 2018-04-27 RX ORDER — HYDROMORPHONE HYDROCHLORIDE 1 MG/ML
1 INJECTION, SOLUTION INTRAMUSCULAR; INTRAVENOUS; SUBCUTANEOUS ONCE
Status: COMPLETED | OUTPATIENT
Start: 2018-04-27 | End: 2018-04-27

## 2018-04-27 RX ORDER — DIPHENHYDRAMINE HCL 25 MG
25 CAPSULE ORAL EVERY 6 HOURS PRN
Status: DISCONTINUED | OUTPATIENT
Start: 2018-04-27 | End: 2018-04-30 | Stop reason: HOSPADM

## 2018-04-27 RX ADMIN — Medication 999 ML/HR: at 20:07

## 2018-04-27 RX ADMIN — SODIUM CITRATE AND CITRIC ACID MONOHYDRATE 30 ML: 500; 334 SOLUTION ORAL at 19:06

## 2018-04-27 RX ADMIN — HYDROMORPHONE HYDROCHLORIDE 1 MG: 1 INJECTION, SOLUTION INTRAMUSCULAR; INTRAVENOUS; SUBCUTANEOUS at 21:46

## 2018-04-27 RX ADMIN — Medication 100 ML/HR: at 20:55

## 2018-04-27 RX ADMIN — KETOROLAC TROMETHAMINE 15 MG: 30 INJECTION, SOLUTION INTRAMUSCULAR at 21:20

## 2018-04-27 RX ADMIN — ACETAMINOPHEN 975 MG: 325 TABLET, FILM COATED ORAL at 21:33

## 2018-04-27 RX ADMIN — SODIUM CHLORIDE, POTASSIUM CHLORIDE, SODIUM LACTATE AND CALCIUM CHLORIDE: 600; 310; 30; 20 INJECTION, SOLUTION INTRAVENOUS at 20:10

## 2018-04-27 RX ADMIN — Medication: at 21:47

## 2018-04-27 RX ADMIN — PHENYLEPHRINE HYDROCHLORIDE 200 MCG: 10 INJECTION, SOLUTION INTRAMUSCULAR; INTRAVENOUS; SUBCUTANEOUS at 19:52

## 2018-04-27 RX ADMIN — SODIUM CHLORIDE, POTASSIUM CHLORIDE, SODIUM LACTATE AND CALCIUM CHLORIDE: 600; 310; 30; 20 INJECTION, SOLUTION INTRAVENOUS at 17:40

## 2018-04-27 RX ADMIN — PHENYLEPHRINE HYDROCHLORIDE 100 MCG: 10 INJECTION, SOLUTION INTRAMUSCULAR; INTRAVENOUS; SUBCUTANEOUS at 19:59

## 2018-04-27 RX ADMIN — Medication: at 21:21

## 2018-04-27 RX ADMIN — SODIUM CHLORIDE, POTASSIUM CHLORIDE, SODIUM LACTATE AND CALCIUM CHLORIDE 1000 ML: 600; 310; 30; 20 INJECTION, SOLUTION INTRAVENOUS at 17:14

## 2018-04-27 RX ADMIN — ONDANSETRON 4 MG: 2 INJECTION INTRAMUSCULAR; INTRAVENOUS at 19:40

## 2018-04-27 RX ADMIN — HYDROMORPHONE HYDROCHLORIDE: 10 INJECTION, SOLUTION INTRAMUSCULAR; INTRAVENOUS; SUBCUTANEOUS at 21:21

## 2018-04-27 RX ADMIN — PHENYLEPHRINE HYDROCHLORIDE 100 MCG: 10 INJECTION, SOLUTION INTRAMUSCULAR; INTRAVENOUS; SUBCUTANEOUS at 20:29

## 2018-04-27 RX ADMIN — ONDANSETRON 4 MG: 2 INJECTION INTRAMUSCULAR; INTRAVENOUS at 20:25

## 2018-04-27 RX ADMIN — CEFAZOLIN SODIUM 2 G: 2 INJECTION, SOLUTION INTRAVENOUS at 19:55

## 2018-04-27 RX ADMIN — Medication 30 ML: at 19:06

## 2018-04-27 RX ADMIN — PHENYLEPHRINE HYDROCHLORIDE 100 MCG: 10 INJECTION, SOLUTION INTRAMUSCULAR; INTRAVENOUS; SUBCUTANEOUS at 20:24

## 2018-04-27 RX ADMIN — OXYTOCIN-SODIUM CHLORIDE 0.9% IV SOLN 30 UNIT/500ML 100 ML/HR: 30-0.9/5 SOLUTION at 20:55

## 2018-04-27 RX ADMIN — PHENYLEPHRINE HYDROCHLORIDE 100 MCG: 10 INJECTION, SOLUTION INTRAMUSCULAR; INTRAVENOUS; SUBCUTANEOUS at 20:06

## 2018-04-27 RX ADMIN — PHENYLEPHRINE HYDROCHLORIDE 100 MCG: 10 INJECTION, SOLUTION INTRAMUSCULAR; INTRAVENOUS; SUBCUTANEOUS at 20:10

## 2018-04-27 RX ADMIN — HYDROMORPHONE HYDROCHLORIDE 1 MG: 1 INJECTION, SOLUTION INTRAMUSCULAR; INTRAVENOUS; SUBCUTANEOUS at 21:53

## 2018-04-27 NOTE — PROGRESS NOTES
Discussed case with Dr. Anderson, reviewed the strip.  Discussed the possible arrhythmia.  If the strip becomes discontinuous, decelerations present will move to  delivery.  At this time will attempt cervidil.

## 2018-04-27 NOTE — PROGRESS NOTES
183: Acceleration noted on [place] Monitor, followed by a 40 second period of marked variability from 100-220bpm. FHR otherwise 120bpm, moderate variability.     : Dr. Shepard notified of fetal tracing. Decision by Dr. Shepard to proceed w/primary  section. Dr. Shepard will assess in person.

## 2018-04-27 NOTE — IP AVS SNAPSHOT
[address]    [place] [address]    Phone:  [phone]                                       After Visit Summary   4/27/2018    Zelda Paulson    MRN: [id number]           After Visit Summary Signature Page     I have received my discharge instructions, and my questions have been answered. I have discussed any challenges I see with this plan with the nurse or doctor.    ..........................................................................................................................................  Patient/Patient Representative Signature      ..........................................................................................................................................  Patient Representative Print Name and Relationship to Patient    ..................................................               ................................................  Date                                            Time    ..........................................................................................................................................  Reviewed by Signature/Title    ...................................................              ..............................................  Date                                                            Time

## 2018-04-27 NOTE — IP AVS SNAPSHOT
MRN:[id number]                      After Visit Summary   2018    Zelda Paulson    MRN: [id number]           Thank you!     Thank you for choosing [facility] for your care. Our goal is always to provide you with excellent care. Hearing back from our patients is one way we can continue to improve our services. Please take a few minutes to complete the written survey that you may receive in the mail after you visit with us. Thank you!        Patient Information     Date Of Birth          1995        About your hospital stay     You were admitted on:  2018 You last received care in the:  [address]    You were discharged on:  2018        Reason for your hospital stay       Maternity care                  Who to Call     For medical emergencies, please call 911.  For non-urgent questions about your medical care, please call your primary care provider or clinic, [phone]  For questions related to your surgery, please call your surgery clinic        Attending Provider     Provider Specialty    Lela Looney MD OB/Gyn    Toft, Zohreh MEZA MD OB/Gyn       Primary Care Provider Office Phone # Fax #    Jasmin Alonso [phone]      After Care Instructions     Activity       Review discharge instructions            Diet       Resume previous diet            Discharge Instructions - Postpartum visit       Schedule postpartum visit with your provider and return to clinic in 6 weeks.                  Further instructions from your care team       Postop  Birth Instructions    Activity       Do not lift more than 10 pounds for 6 weeks after surgery.  Ask family and friends for help when you need it.    No driving until you have stopped taking your pain medications (usually two weeks after surgery).    No heavy exercise or activity for 6 weeks.  Don't do anything that will put a strain on your surgery  site.    Don't strain when using the toilet.  Your care team may prescribe a stool softener if you have problems with your bowel movements.     To care for your incision:       Keep the incision clean and dry.    Do not soak your incision in water. No swimming or hot tubs until it has fully healed. You may soak in the bathtub if the water level is below your incision.    Do not use peroxide, gel, cream, lotion, or ointment on your incision.    Adjust your clothes to avoid pressure on your surgery site (check the elastic in your underwear for example).     You may see a small amount of clear or pink drainage and this is normal.  Check with your health care provider:       If the drainage increases or has an odor.    If the incision reddens, you have swelling, or develop a rash.    If you have increased pain and the medicine we prescribed doesn't help.    If you have a fever above 100.4 F (38 C) with or without chills when placing thermometer under your tongue.   The area around your incision (surgery wound), will feel numb.  This is normal. The numbness should go away in less than a year.     Keep your hands clean:  Always wash your hands before touching your incision (surgery wound). This helps reduce your risk of infection. If your hands aren't dirty, you may use an alcohol hand-rub to clean your hands. Keep your nails clean and short.    Call your healthcare provider if you have any of these symptoms:       You soak a sanitary pad with blood within 1 hour, or you see blood clots larger than a golf ball.    Bleeding that lasts more than 6 weeks.    Vaginal discharge that smells bad.    Severe pain, cramping or tenderness in your lower belly area.    A need to urinate more frequently (use the toilet more often), more urgently (use the toilet very quickly), or it burns when you urinate.    Nausea and vomiting.    Redness, swelling or pain around a vein in your leg.    Problems breastfeeding or a red or painful area  "on your breast.    Chest pain and cough or are gasping for air.    Problems with coping with sadness, anxiety or depression. If you have concerns about hurting yourself or the baby, call your provider immediately.      You have questions or concerns after you return home.                  Please call if you have uncontrolled pain, nausea, vomiting, fevers, increased bleeding or issues with your incision.    Pending Results     Date and Time Order Name Status Description    4/27/2018 2040 Placenta Path Order and Indications (PLACENTA) In process             Statement of Approval     Ordered          04/30/18 0802  I have reviewed and agree with all the recommendations and orders detailed in this document.  EFFECTIVE NOW     Approved and electronically signed by:  Chema Fam MD             Admission Information     Date & Time Provider Department Dept. Phone    4/27/2018 Zohreh Meade MD [address] [phone]      Your Vitals Were     Blood Pressure Pulse Temperature Respirations Height Weight    121/72 69 97.9  F (36.6  C) (Oral) 16 1.575 m (5' 2\") 108.9 kg (240 lb)    Last Period Pulse Oximetry BMI (Body Mass Index)             07/29/2017 (Exact Date) 98% 43.9 kg/m2         MyChart Information     [employer] gives you secure access to your electronic health record. If you see a primary care provider, you can also send messages to your care team and make appointments. If you have questions, please call your primary care clinic.  If you do not have a primary care provider, please call [phone] and they will assist you.        Care EveryWhere ID     This is your Care EveryWhere ID. This could be used by other organizations to access your [facility] medical records  [id number]        Equal Access to Services     [facility] CHRISTIE : Merle Henderson, david estrada, reymundo sampson. So wa " [phone].    ATENCIÓN: Si jones farrar, tiene a shah disposición servicios gratuitos de asistencia lingüística. Angela hensley [phone].    We comply with applicable federal civil rights laws and [place] laws. We do not discriminate on the basis of race, color, national origin, age, disability, sex, sexual orientation, or gender identity.               Review of your medicines      START taking        Dose / Directions    ibuprofen 600 MG tablet   Commonly known as:  ADVIL/MOTRIN        Dose:  600 mg   Take 1 tablet (600 mg) by mouth every 6 hours as needed for other (carmping)   Quantity:  30 tablet   Refills:  0       oxyCODONE IR 5 MG tablet   Commonly known as:  ROXICODONE   Used for:  Previous  delivery, antepartum condition or complication        Dose:  5-10 mg   Take 1-2 tablets (5-10 mg) by mouth every 3 hours as needed for other (pain control or improvement in physical function. Hold dose for analgesic side effects.)   Quantity:  28 tablet   Refills:  0         CONTINUE these medicines which have NOT CHANGED        Dose / Directions    albuterol 108 (90 Base) MCG/ACT Inhaler   Commonly known as:  PROAIR HFA/PROVENTIL HFA/VENTOLIN HFA        Dose:  2 puff   Inhale 2 puffs into the lungs every 4 hours as needed for shortness of breath / dyspnea or wheezing   Refills:  0       HYDROcodone-acetaminophen 5-325 MG per tablet   Commonly known as:  NORCO        Dose:  1-2 tablet   Take 1-2 tablets by mouth every 4 hours as needed for moderate to severe pain   Quantity:  15 tablet   Refills:  0       IRON SUPPLEMENT PO        Refills:  0       LEVOTHYROXINE SODIUM PO        Dose:  25 mcg   Take 25 mcg by mouth daily   Refills:  0       metFORMIN 500 MG tablet   Commonly known as:  GLUCOPHAGE        Dose:  500 mg   Take 500 mg by mouth 2 times daily (with meals)   Refills:  0       norethindrone-ethinyl estradiol 1-20 MG-MCG per tablet   Commonly known as:  JUNEL FE         Dose:  1 tablet   Take 1  tablet by mouth daily   Refills:  0            Where to get your medicines      Some of these will need a paper prescription and others can be bought over the counter. Ask your nurse if you have questions.     Bring a paper prescription for each of these medications     ibuprofen 600 MG tablet    oxyCODONE IR 5 MG tablet                Protect others around you: Learn how to safely use, store and throw away your medicines at www.disposemymeds.org.        Information about OPIOIDS     PRESCRIPTION OPIOIDS: WHAT YOU NEED TO KNOW   You have a prescription for an opioid (narcotic) pain medicine. Opioids can cause addiction. If you have a history of chemical dependency of any type, you are at a higher risk of becoming addicted to opioids. Only take this medicine after all other options have been tried. Take it for as short a time and as few doses as possible.     Do not:    Drive. If you drive while taking these medicines, you could be arrested for driving under the influence (DUI).    Operate heavy machinery    Do any other dangerous activities while taking these medicines.     Drink any alcohol while taking these medicines.      Take with any other medicines that contain acetaminophen. Read all labels carefully. Look for the word  acetaminophen  or  Tylenol.  Ask your pharmacist if you have questions or are unsure.    Store your pills in a secure place, locked if possible. We will not replace any lost or stolen medicine. If you don t finish your medicine, please throw away (dispose) as directed by your pharmacist. The [place] Pollution Control Agency has more information about safe disposal: https://www.pca.[facility].[place].us/living-green/managing-unwanted-medications    All opioids tend to cause constipation. Drink plenty of water and eat foods that have a lot of fiber, such as fruits, vegetables, prune juice, apple juice and high-fiber cereal. Take a laxative (Miralax, milk of magnesia, Colace, Senna) if you don t move  your bowels at least every other day.              Medication List: This is a list of all your medications and when to take them. Check marks below indicate your daily home schedule. Keep this list as a reference.      Medications           Morning Afternoon Evening Bedtime As Needed    albuterol 108 (90 Base) MCG/ACT Inhaler   Commonly known as:  PROAIR HFA/PROVENTIL HFA/VENTOLIN HFA   Inhale 2 puffs into the lungs every 4 hours as needed for shortness of breath / dyspnea or wheezing                                HYDROcodone-acetaminophen 5-325 MG per tablet   Commonly known as:  NORCO   Take 1-2 tablets by mouth every 4 hours as needed for moderate to severe pain                                ibuprofen 600 MG tablet   Commonly known as:  ADVIL/MOTRIN   Take 1 tablet (600 mg) by mouth every 6 hours as needed for other (carmping)   Last time this was given:  800 mg on 4/30/2018  4:12 AM                                IRON SUPPLEMENT PO                                LEVOTHYROXINE SODIUM PO   Take 25 mcg by mouth daily                                metFORMIN 500 MG tablet   Commonly known as:  GLUCOPHAGE   Take 500 mg by mouth 2 times daily (with meals)                                norethindrone-ethinyl estradiol 1-20 MG-MCG per tablet   Commonly known as:  JUNEL FE 1/20   Take 1 tablet by mouth daily                                oxyCODONE IR 5 MG tablet   Commonly known as:  ROXICODONE   Take 1-2 tablets (5-10 mg) by mouth every 3 hours as needed for other (pain control or improvement in physical function. Hold dose for analgesic side effects.)   Last time this was given:  5 mg on 4/29/2018 11:34 PM

## 2018-04-27 NOTE — H&P
"Zelda Paulson is an 23 year old female.  at 38+6 presented to triage with complaint of losing mucus plug.  On NST unable to trace fetus appropriately.  BPP was done showing 6/8, 2 off for breathing.  Once the strip was more continuous suspicion for arrythmia.  Disc case with Dr. Anderson.  If we are able to monitor the baby with continuous strip and decelerations not apparent will move toward induction.  Disc possibilty of induction vs primary  at this time, she would prefer induction.  Will attempt cervidil at this time.    Past Medical History:   Diagnosis Date     Chronic kidney disease     kidney stones     Uncomplicated asthma        Allergies:   Allergies   Allergen Reactions     Fentanyl      Morphine      Sulfa Drugs        Active Problems:    Pregnancy    Blood pressure 122/74, temperature 97.2  F (36.2  C), temperature source Temporal, resp. rate 18, height 1.575 m (5' 2\"), weight 108.9 kg (240 lb), last menstrual period 2017.    Review of Systems   All other systems reviewed and are negative.      Physical Exam   Constitutional: She appears well-developed and well-nourished.   HENT:   Head: Normocephalic and atraumatic.   Neck: Normal range of motion. Neck supple.   Cardiovascular: Normal rate and regular rhythm.    Pulmonary/Chest: Effort normal and breath sounds normal.       Assessment:   at 38+6, possible fetal arrythmia    Plan:  Cervidil trial    Devyn Pool  2018  "

## 2018-04-27 NOTE — PLAN OF CARE
Primeitan presents to [facility] at 1335, stating that she's been working all day and noticing more contractions and she lost her mucous plug and wants to get checked out before making her long commute home. Monitor consent obtained and applied. States she was 1cm at her last clinic appt and her membranes were stripped.  Pt denies any problems with this pregnancy, denies any bleeding or LOF, states good FM. SVE done, no change from clinic. Discussed POC with pt who states understanding.

## 2018-04-28 LAB
HGB BLD-MCNC: 8.7 G/DL (ref 11.7–15.7)
T PALLIDUM IGG+IGM SER QL: NEGATIVE

## 2018-04-28 PROCEDURE — 25000132 ZZH RX MED GY IP 250 OP 250 PS 637: Performed by: OBSTETRICS & GYNECOLOGY

## 2018-04-28 PROCEDURE — 36415 COLL VENOUS BLD VENIPUNCTURE: CPT | Performed by: OBSTETRICS & GYNECOLOGY

## 2018-04-28 PROCEDURE — 85018 HEMOGLOBIN: CPT | Performed by: OBSTETRICS & GYNECOLOGY

## 2018-04-28 PROCEDURE — 12000037 ZZH R&B POSTPARTUM INTERMEDIATE

## 2018-04-28 PROCEDURE — 25000128 H RX IP 250 OP 636: Performed by: OBSTETRICS & GYNECOLOGY

## 2018-04-28 RX ORDER — KETOROLAC TROMETHAMINE 30 MG/ML
30 INJECTION, SOLUTION INTRAMUSCULAR; INTRAVENOUS EVERY 6 HOURS
Status: COMPLETED | OUTPATIENT
Start: 2018-04-28 | End: 2018-04-28

## 2018-04-28 RX ORDER — ACETAMINOPHEN 325 MG/1
975 TABLET ORAL EVERY 8 HOURS PRN
Status: DISCONTINUED | OUTPATIENT
Start: 2018-04-28 | End: 2018-04-30 | Stop reason: HOSPADM

## 2018-04-28 RX ADMIN — SODIUM CHLORIDE, SODIUM LACTATE, POTASSIUM CHLORIDE, CALCIUM CHLORIDE AND DEXTROSE MONOHYDRATE: 5; 600; 310; 30; 20 INJECTION, SOLUTION INTRAVENOUS at 02:43

## 2018-04-28 RX ADMIN — KETOROLAC TROMETHAMINE 15 MG: 30 INJECTION, SOLUTION INTRAMUSCULAR at 08:52

## 2018-04-28 RX ADMIN — SODIUM CHLORIDE, SODIUM LACTATE, POTASSIUM CHLORIDE, CALCIUM CHLORIDE AND DEXTROSE MONOHYDRATE: 5; 600; 310; 30; 20 INJECTION, SOLUTION INTRAVENOUS at 09:37

## 2018-04-28 RX ADMIN — Medication: at 04:59

## 2018-04-28 RX ADMIN — OXYCODONE HYDROCHLORIDE 5 MG: 5 TABLET ORAL at 15:06

## 2018-04-28 RX ADMIN — ACETAMINOPHEN 975 MG: 325 TABLET ORAL at 13:13

## 2018-04-28 RX ADMIN — DIPHENHYDRAMINE HYDROCHLORIDE 25 MG: 25 CAPSULE ORAL at 13:53

## 2018-04-28 RX ADMIN — ACETAMINOPHEN 975 MG: 325 TABLET ORAL at 21:22

## 2018-04-28 RX ADMIN — KETOROLAC TROMETHAMINE 15 MG: 30 INJECTION, SOLUTION INTRAMUSCULAR at 02:41

## 2018-04-28 RX ADMIN — OXYCODONE HYDROCHLORIDE 5 MG: 5 TABLET ORAL at 11:39

## 2018-04-28 RX ADMIN — SENNOSIDES AND DOCUSATE SODIUM 2 TABLET: 8.6; 5 TABLET ORAL at 19:15

## 2018-04-28 RX ADMIN — SENNOSIDES AND DOCUSATE SODIUM 1 TABLET: 8.6; 5 TABLET ORAL at 08:54

## 2018-04-28 RX ADMIN — KETOROLAC TROMETHAMINE 30 MG: 30 INJECTION, SOLUTION INTRAMUSCULAR at 15:06

## 2018-04-28 RX ADMIN — IBUPROFEN 800 MG: 400 TABLET ORAL at 21:22

## 2018-04-28 RX ADMIN — ACETAMINOPHEN 975 MG: 325 TABLET, FILM COATED ORAL at 04:49

## 2018-04-28 RX ADMIN — OXYCODONE HYDROCHLORIDE 5 MG: 5 TABLET ORAL at 22:32

## 2018-04-28 RX ADMIN — OXYCODONE HYDROCHLORIDE 5 MG: 5 TABLET ORAL at 19:15

## 2018-04-28 NOTE — ANESTHESIA POSTPROCEDURE EVALUATION
Patient: Zelda Paulson    Procedure(s):   - Wound Class: II-Clean Contaminated    Diagnosis:Nonreassurring FHT's  Diagnosis Additional Information: No value filed.    Anesthesia Type:  Spinal    Note:  Anesthesia Post Evaluation    Patient location during evaluation: PACU  Patient participation: Able to fully participate in evaluation  Level of consciousness: awake  Pain management: adequate  Airway patency: patent  Cardiovascular status: acceptable  Respiratory status: acceptable  Hydration status: acceptable  PONV: none     Anesthetic complications: None          Last vitals:  Vitals:    04/27/18 2100 04/27/18 2115 04/27/18 2130   BP: 99/69 107/69 111/74   Resp: 11 14 10   Temp:      SpO2: 96% 97% 97%         Electronically Signed By: Jessica Horowitz MD, MD  April 27, 2018  9:44 PM

## 2018-04-28 NOTE — PROGRESS NOTES
Initial Lactation visit. Recommend unlimited, frequent breast feedings:  At least 8 - 12 times every 24 hours.  Avoid pacifiers and supplementation with formula unless medically indicated    Explained benefits of holding baby skin on skin to help promote better breastfeeding outcomes. Baby is sleepy - reviewed wake-up techniques.Will continue to follow and verify latch. ASHLEI Henning RN IBCLC

## 2018-04-28 NOTE — PROVIDER NOTIFICATION
04/27/18 2135   Provider Notification   Provider Name/Title Dr Shepard   Method of Notification In Department   Request Evaluate - Remote   Notification Reason Pain   Dr Shepard notified of pt pain rating 10/10. Pt given tordal, tylenol, and PCA without adequate pain relief. Orders received for 1mg of Dilaudid IVP now x1 and to increase PCA to 0.3mg/10min.

## 2018-04-28 NOTE — PLAN OF CARE
Problem: Patient Care Overview  Goal: Plan of Care/Patient Progress Review  Outcome: Improving  Vitals stable. Feels well. Broderick discontinued- voided x 1. Up independently in room. Dressing clean and dry. PCA discontinued- on Oral pain meds- controlling pain well. Tolerating regular diet.  Working on breast feeding. Will continue to monitor.

## 2018-04-28 NOTE — PLAN OF CARE
Problem: Patient Care Overview  Goal: Plan of Care/Patient Progress Review  Outcome: No Change  Vss. Fundus firm @ U.   clear. BS present.  Dressing CDI.  LE edema. PCD's in place.  Broderick patent.  Breast feeding with assistance.  PCA and toradol controlling pain.  Will continue to monitor.

## 2018-04-28 NOTE — PROGRESS NOTES
S: Patient doing well with no overnight issues and no current complaints.  Pain is well controlled.  Tolerating PO intake.  Breast feeding without issues.  Ambulating without difficulty.  Broderick removed, awaiting spontaneous void.  Lochia is less than normal menses and decreasing.  Denies fevers, headaches, changes in vision, chest pain, SOB, nausea, vomiting, diarrhea, constipation, RUQ tenderness, dysuria, or LE pain.    O: VSS, Hb 8.7 (pre-op 9.7)  NAD, AAOx3, RRR, CTAB, ABd soft NTND, Firm fundus 1cm below the umbilicus, LE NT no edema, Dressing c/d/i, instrcuted to remove today in the shower    A: 22yo  s/PPD#1 1LTCS who is recovering well.    P: Routine post partum care.  Plan for discharge Monday.  Await spontaneous void.  Male infant for circ.  Appropriate drop in Hb, patient tolerating well.  Start iron.  Transition to oral pain med control.

## 2018-04-28 NOTE — ANESTHESIA CARE TRANSFER NOTE
Patient: Zelda Paulson    Procedure(s):   - Wound Class: II-Clean Contaminated    Diagnosis: Nonreassurring FHT's  Diagnosis Additional Information: No value filed.    Anesthesia Type:   Spinal     Note:  Airway :Room Air  Patient transferred to:PACU  Comments: Transferred to L&D PACU, spontaneous RR, on room air.  Monitors and alarms on and functioning, VSS, patient awake and comfortable.  Report to L&D PACU RN.Handoff Report: Identifed the Patient, Identified the Reponsible Provider, Reviewed the pertinent medical history, Discussed the surgical course, Reviewed Intra-OP anesthesia mangement and issues during anesthesia, Set expectations for post-procedure period and Allowed opportunity for questions and acknowledgement of understanding      Vitals: (Last set prior to Anesthesia Care Transfer)    CRNA VITALS  4/27/2018 2008 - 4/27/2018 2046 4/27/2018             NIBP: (!)  68/47    Pulse: 80    NIBP Mean: 52    SpO2: 96 %    Resp Rate (set): 10                Electronically Signed By: QUINTIN Juarez CRNA  April 27, 2018  8:46 PM

## 2018-04-28 NOTE — ANESTHESIA PREPROCEDURE EVALUATION
Anesthesia Evaluation     . Pt has had prior anesthetic.     No history of anesthetic complications          ROS/MED HX    ENT/Pulmonary:     (+)asthma , . .   (-) sleep apnea   Neurologic:       Cardiovascular:         METS/Exercise Tolerance:     Hematologic:         Musculoskeletal:         GI/Hepatic:        (-) GERD   Renal/Genitourinary:         Endo:     (+) Obesity, .      Psychiatric:         Infectious Disease:         Malignancy:         Other:                                    Anesthesia Plan      History & Physical Review  History and physical reviewed and following examination; no interval change.    ASA Status:  3 emergent.    NPO Status:  > 8 hours    Plan for Spinal   PONV prophylaxis:  Ondansetron (or other 5HT-3)       Postoperative Care  Postoperative pain management:  Multi-modal analgesia.      Consents  Anesthetic plan, risks, benefits and alternatives discussed with:  Patient..                      Procedure: Procedure(s):   SECTION  Preop diagnosis: Nonreassurring FHT's    Allergies   Allergen Reactions     Fentanyl      Morphine      Sulfa Drugs      Past Medical History:   Diagnosis Date     Chronic kidney disease     kidney stones     Uncomplicated asthma      Past Surgical History:   Procedure Laterality Date     APPENDECTOMY       ENT SURGERY      tonsillectomy     Social History   Substance Use Topics     Smoking status: Never Smoker     Smokeless tobacco: Never Used     Alcohol use No      Comment: occ     Prior to Admission medications    Medication Sig Start Date End Date Taking? Authorizing Provider   albuterol (PROAIR HFA/PROVENTIL HFA/VENTOLIN HFA) 108 (90 BASE) MCG/ACT Inhaler Inhale 2 puffs into the lungs every 4 hours as needed for shortness of breath / dyspnea or wheezing   Yes Reported, Patient   Ferrous Sulfate (IRON SUPPLEMENT PO)    Yes Reported, Patient   HYDROcodone-acetaminophen (NORCO) 5-325 MG per tablet Take 1-2 tablets by mouth every 4 hours as needed  for moderate to severe pain  Patient not taking: Reported on 8/16/2017 8/9/17   Amita, Alee, CNP   LEVOTHYROXINE SODIUM PO Take 25 mcg by mouth daily    Reported, Patient   metFORMIN (GLUCOPHAGE) 500 MG tablet Take 500 mg by mouth 2 times daily (with meals)    Reported, Patient   norethindrone-ethinyl estradiol (JUNEL FE 1/20) 1-20 MG-MCG per tablet Take 1 tablet by mouth daily    Reported, Patient     Current Facility-Administered Medications Ordered in Epic   Medication Dose Route Frequency Last Rate Last Dose     ceFAZolin (ANCEF) 1 g vial to attach to  ml bag for ADULT or 50 ml bag for PEDS  1 g Intravenous See Admin Instructions         ceFAZolin (ANCEF) intermittent infusion 2 g in 100 mL dextrose PRE-MIX  2 g Intravenous Pre-Op/Pre-procedure x 1 dose         ceFAZolin-dextrose (ANCEF) 2-4 GM/100ML-% infusion             lactated ringers infusion   Intravenous Continuous 125 mL/hr at 04/27/18 1740       No current Psychiatric-ordered outpatient prescriptions on file.       lactated ringers 125 mL/hr at 04/27/18 1740     Wt Readings from Last 1 Encounters:   04/27/18 108.9 kg (240 lb)     Temp Readings from Last 1 Encounters:   04/27/18 36.2  C (97.2  F) (Temporal)     BP Readings from Last 6 Encounters:   04/27/18 122/74   08/09/17 134/57   08/09/17 115/73   06/27/17 131/86     Pulse Readings from Last 4 Encounters:   08/09/17 72   06/27/17 74     Resp Readings from Last 1 Encounters:   04/27/18 18     SpO2 Readings from Last 1 Encounters:   08/09/17 98%     Recent Labs   Lab Test  08/09/17   1530  08/08/17   1940   NA  139  140   POTASSIUM  3.5  3.6   CHLORIDE  104  105   CO2  26  24   ANIONGAP  9  11   GLC  73  93   BUN  11  12   CR  0.77  0.85   SHONNA  8.2*  8.6     Recent Labs   Lab Test  08/09/17   1530  08/08/17   1940   AST  37  26   ALT  55*  47   ALKPHOS  70  83   BILITOTAL  0.2  0.4   LIPASE  93  103     Recent Labs   Lab Test  04/27/18   1735  08/09/17   1530   WBC  10.4  5.9   HGB  9.7*  13.0   PLT   198  224     Recent Labs   Lab Test  04/27/18   1735   ABO  A   RH  Pos     No results for input(s): INR, PTT in the last 63510 hours.   No results for input(s): TROPI in the last 15096 hours.  No results for input(s): PH, PCO2, PO2, HCO3 in the last 98885 hours.  Recent Labs   Lab Test  08/08/17   1925   HCG  Negative     Recent Results (from the past 744 hour(s))   US Fetal Profile w/o Non-Stress-Radiology Performed    Narrative    ULTRASOUND FETAL BIOPHYSICAL PROFILE WITHOUT NONSTRESS TEST  4/27/2018  4:17 PM    HISTORY: Non-reactive NST.    COMPARISON: None.    FINDINGS: There is a single live IUP in a cephalic presentation. Fetal  heart rate is 111/133 bpm.    Fetal breathing scores a 0 out of 2. Gross body movement scores a 2  out of 2. Fetal tone scores a 2 out of 2. Amniotic fluid volume scores  a 2 out of 2.    JHONNY is normal.      Impression    IMPRESSION: Biophysical profile score is 6 out of 8.       RECENT LABS:   ECG:   ECHO:

## 2018-04-28 NOTE — OP NOTE
Section Operative Note    Zelda Paulson  2018    Pre-operative Diagnosis:    at 38w6d   BPP 6/10  Possible fetal arrhythmia        Post-operative Diagnosis: Same    Procedure done: Primary LTCS    Surgeon: Lela Shepard MD    Anesthesia: combined spinal-epidural    Complications:  None    EBL: 800 ml      IV fluids- see anesthesia record    Drains- chinchilla catheter    Findings:  Infant male  Weight 7 lbs 8 olz  APGARS- 8/9  3-V cord  Normal tubes and ovaries.    Indications:   Patient is a 23 year old , who was admitted at 38w6d weeks pregnancy for discharge and decreased fetal movement day prior to admission.   On the monitor some irregular baseline FHT noted, BPP 6/8.  Discussed case with MFM and decision was reached to proceed with induction as long as the strip was continuous and no decelerations.  Prior to cervidil placement had some marked variability, possible variable decelerations.  Discussed with the patient that we could continue as long as fetal status reassuring.  Due to unknown etiology of the irregular fetal heart tones decision was made to proceed with primary  delivery.  R/b/a were discussed and all questions were answered.     Procedure Details:  IV antibiotics given per protocol.  SCD placed for VTE prophylaxis.  Spinal anesthesia administered, checked and found to be adequate.  Chinchilla catheter was in place.  The patient was draped and prepped in the usual sterile manner in the dorsal postion with a left tilt. A Pfannenstiel incision was made and carried down through the subcutaneous tissue to the fascia. Fascial incision was made and extended transversely.The fascia was  from the underlying rectus tissue superiorly and inferiorly. The peritoneum was identified and entered bluntly. Peritoneal incision was extended with careful visualization of bladder.  The bladder blade was inserted.   The utero-vesical peritoneal reflection was opened sharply  and extended latteraly.  The bladder blade was then replaced.   Transverse incision made in the lower uterine segment above the bladder.  Infant  was delivered from the cephalic presentation. Nose and mouth suctioned at the abdominal wall. Rest of the infant delivered without complications. Umbilical cord was nuchal x 2 and wrapped around right arm. After the umbilical cord was clamped and cut cord blood sampled.  The placenta was allowed to separate and expelled spontaneously with membranes. Sent for histopathology.  The uterus was well retracted.  Tubes and ovaries appeared normal.   The uterine incision was closed with running locked sutures of 0 vicryl.  Second layer of sutures used to imbricate the initial layer.  Hemostasis was observed.   Suctioning of the peritoneal cavity was carried out. Para-colic gutters were cleared of all clots and debris.  Hysterotomy once again checked to ascertain hemostasis.  The fascia was closed with running sutures of 0 Vicryl.   Subcutaneous tissue re approximated with 3-0 vicryl.  The skin was closed in a subcuticular fashion with quill mono derm suture.  Instrument, sponge, and needle counts were correct x 3   Patient and the baby were returned to the recovery room in a stable condition.    Lela Shepard MD

## 2018-04-29 PROCEDURE — 12000037 ZZH R&B POSTPARTUM INTERMEDIATE

## 2018-04-29 PROCEDURE — 25000132 ZZH RX MED GY IP 250 OP 250 PS 637: Performed by: OBSTETRICS & GYNECOLOGY

## 2018-04-29 RX ORDER — OXYCODONE HYDROCHLORIDE 5 MG/1
5-10 TABLET ORAL
Qty: 28 TABLET | Refills: 0 | Status: ON HOLD | OUTPATIENT
Start: 2018-04-29 | End: 2019-01-09

## 2018-04-29 RX ORDER — IBUPROFEN 600 MG/1
600 TABLET, FILM COATED ORAL EVERY 6 HOURS PRN
Qty: 30 TABLET | Refills: 0 | Status: ON HOLD | OUTPATIENT
Start: 2018-04-29 | End: 2021-04-10

## 2018-04-29 RX ADMIN — ACETAMINOPHEN 975 MG: 325 TABLET ORAL at 14:16

## 2018-04-29 RX ADMIN — IBUPROFEN 800 MG: 400 TABLET ORAL at 17:35

## 2018-04-29 RX ADMIN — IBUPROFEN 800 MG: 400 TABLET ORAL at 11:21

## 2018-04-29 RX ADMIN — OXYCODONE HYDROCHLORIDE 5 MG: 5 TABLET ORAL at 01:35

## 2018-04-29 RX ADMIN — ACETAMINOPHEN 975 MG: 325 TABLET ORAL at 05:15

## 2018-04-29 RX ADMIN — OXYCODONE HYDROCHLORIDE 5 MG: 5 TABLET ORAL at 23:34

## 2018-04-29 RX ADMIN — OXYCODONE HYDROCHLORIDE 5 MG: 5 TABLET ORAL at 10:03

## 2018-04-29 RX ADMIN — IBUPROFEN 800 MG: 400 TABLET ORAL at 05:15

## 2018-04-29 RX ADMIN — SENNOSIDES AND DOCUSATE SODIUM 2 TABLET: 8.6; 5 TABLET ORAL at 20:14

## 2018-04-29 RX ADMIN — ACETAMINOPHEN 975 MG: 325 TABLET ORAL at 22:17

## 2018-04-29 RX ADMIN — IBUPROFEN 800 MG: 400 TABLET ORAL at 23:14

## 2018-04-29 NOTE — PROGRESS NOTES
"Zelda Paulson  April 29, 2018    S: pt is doing well.  Tolerating po intake and pain is well controlled.  Ambulating.  Decreasing lochia. Breast/Bottle feeding.    O:/61  Pulse 64  Temp 98.7  F (37.1  C) (Oral)  Resp 16  Ht 1.575 m (5' 2\")  Wt 108.9 kg (240 lb)  LMP 07/29/2017 (Exact Date)  SpO2 98%  Breastfeeding? Unknown  BMI 43.9 kg/m2    Recent Labs  Lab 04/28/18  0835 04/27/18  1735   HGB 8.7* 9.7*     Abdomen: soft, non distended, fundus firm below the umbilicus.  Incision is C/D/I  Ext: non tender, no edema or erythema    A/P: s/p LTCS POD #2  Doing well  Continue care  Discharge planning for tomorrow    Devyn Vuabbey    "

## 2018-04-29 NOTE — PLAN OF CARE
Problem: Patient Care Overview  Goal: Plan of Care/Patient Progress Review  Outcome: Improving  VSS. Pain managed with Tylenol, Ibuprofen and Oxycodone 5 mg. Up independently, voiding without difficulty, incision is WDL. Breastfeeding. Will continue to monitor.

## 2018-04-29 NOTE — PLAN OF CARE
Problem: Patient Care Overview  Goal: Plan of Care/Patient Progress Review  Outcome: Improving  Vss, fundus firn with scant flow. Incision intact with liquid bandage. Pain managed with tylenol, ibuprofen and prn oxycodone. Pt wearing abdominal binder for comfort. Walking in room. Ambulating well. Breast feeding  well.

## 2018-04-29 NOTE — PLAN OF CARE
Problem: Patient Care Overview  Goal: Plan of Care/Patient Progress Review  Outcome: Improving  Vss, voiding and stooling. Circumcision done today, DTV, breast feeding well. TcB recheck was Hr, TsB LIR.

## 2018-04-29 NOTE — LACTATION NOTE
Routine visit. Zelda states breastfeeding is improving. She's pleased with how feedings are going so far. Encouraged her to continue to call staff for latch checks or assistance with feedings. Zelda appreciative of my visit. Will revisit as needed.

## 2018-04-30 VITALS
SYSTOLIC BLOOD PRESSURE: 121 MMHG | TEMPERATURE: 97.9 F | DIASTOLIC BLOOD PRESSURE: 72 MMHG | RESPIRATION RATE: 16 BRPM | WEIGHT: 240 LBS | OXYGEN SATURATION: 98 % | HEIGHT: 62 IN | HEART RATE: 69 BPM | BODY MASS INDEX: 44.16 KG/M2

## 2018-04-30 PROCEDURE — 25000132 ZZH RX MED GY IP 250 OP 250 PS 637: Performed by: OBSTETRICS & GYNECOLOGY

## 2018-04-30 RX ADMIN — SENNOSIDES AND DOCUSATE SODIUM 2 TABLET: 8.6; 5 TABLET ORAL at 08:40

## 2018-04-30 RX ADMIN — OXYCODONE HYDROCHLORIDE 5 MG: 5 TABLET ORAL at 10:24

## 2018-04-30 RX ADMIN — IBUPROFEN 800 MG: 400 TABLET ORAL at 04:12

## 2018-04-30 RX ADMIN — IBUPROFEN 800 MG: 400 TABLET ORAL at 10:24

## 2018-04-30 RX ADMIN — ACETAMINOPHEN 975 MG: 325 TABLET ORAL at 06:04

## 2018-04-30 NOTE — DISCHARGE SUMMARY
[facility] Discharge Summary    Zedla Paulson MRN# [id number]   Age: 23 year old YOB: 1995     Date of Admission:  2018  Date of Discharge::  2018   Admitting Physician:  Zohreh Meade MD  Discharge Physician:  Chema Fam MD     Home clinic: [facility]          Admission Diagnoses:   Suspected fetal arrythmia          Discharge Diagnosis:   Same, Delivered          Procedures:   Procedure(s): Primary low transverse  section              Medications Prior to Admission:     Prescriptions Prior to Admission   Medication Sig Dispense Refill Last Dose     albuterol (PROAIR HFA/PROVENTIL HFA/VENTOLIN HFA) 108 (90 BASE) MCG/ACT Inhaler Inhale 2 puffs into the lungs every 4 hours as needed for shortness of breath / dyspnea or wheezing   2018 at Unknown time     Ferrous Sulfate (IRON SUPPLEMENT PO)    2018 at Unknown time     HYDROcodone-acetaminophen (NORCO) 5-325 MG per tablet Take 1-2 tablets by mouth every 4 hours as needed for moderate to severe pain (Patient not taking: Reported on 2017) 15 tablet 0 Not Taking     LEVOTHYROXINE SODIUM PO Take 25 mcg by mouth daily   Taking     metFORMIN (GLUCOPHAGE) 500 MG tablet Take 500 mg by mouth 2 times daily (with meals)   Taking     norethindrone-ethinyl estradiol (JUNEL ) 1-20 MG-MCG per tablet Take 1 tablet by mouth daily   Taking             Discharge Medications:     Current Discharge Medication List      START taking these medications    Details   ibuprofen (ADVIL/MOTRIN) 600 MG tablet Take 1 tablet (600 mg) by mouth every 6 hours as needed for other (carmping)  Qty: 30 tablet, Refills: 0    Associated Diagnoses:  delivery delivered      oxyCODONE IR (ROXICODONE) 5 MG tablet Take 1-2 tablets (5-10 mg) by mouth every 3 hours as needed for other (pain control or improvement in physical function. Hold dose for analgesic side effects.)  Qty: 28 tablet, Refills: 0    Associated Diagnoses: Previous   delivery, antepartum condition or complication         CONTINUE these medications which have NOT CHANGED    Details   albuterol (PROAIR HFA/PROVENTIL HFA/VENTOLIN HFA) 108 (90 BASE) MCG/ACT Inhaler Inhale 2 puffs into the lungs every 4 hours as needed for shortness of breath / dyspnea or wheezing      Ferrous Sulfate (IRON SUPPLEMENT PO)       HYDROcodone-acetaminophen (NORCO) 5-325 MG per tablet Take 1-2 tablets by mouth every 4 hours as needed for moderate to severe pain  Qty: 15 tablet, Refills: 0      LEVOTHYROXINE SODIUM PO Take 25 mcg by mouth daily      metFORMIN (GLUCOPHAGE) 500 MG tablet Take 500 mg by mouth 2 times daily (with meals)      norethindrone-ethinyl estradiol (JUNEL ) 1-20 MG-MCG per tablet Take 1 tablet by mouth daily                   Consultations:   No consultations were requested during this admission          Brief History of Labor:   Please see operative note for further details.           Hospital Course:   The patient's hospital course was unremarkable.  She recovered as anticipated and experienced no post-operative complications.  Upon discharge, her pain was well controlled. Vaginal bleeding is mild to moderate.  Voiding without difficulty.  Ambulating well and tolerating a normal diet.  No fever or significant wound drainage.  Breastfeeding well.  Infant is stable.  She had a bowel movement prior to discharge.  She was discharged on post-partum day #3.    Post-partum hemoglobin:   Hemoglobin   Date Value Ref Range Status   2018 8.7 (L) 11.7 - 15.7 g/dL Final             Discharge Instructions and Follow-Up:   Discharge diet: Regular   Discharge activity: Activity as tolerated, no lifting greater than 10 lbs   Discharge follow-up: Follow up with consultant in 6 weeks for post partum visit   Wound care: Keep wound clean and dry           Discharge Disposition:   Discharged to home      Attestation:  I have reviewed today's vital signs, notes, medications, labs and  imaging.  Amount of time performed on this discharge summary: 10 minutes.    Chema Fam MD

## 2018-04-30 NOTE — DISCHARGE INSTRUCTIONS

## 2018-04-30 NOTE — PLAN OF CARE
Problem: Patient Care Overview  Goal: Plan of Care/Patient Progress Review  Outcome: Improving  Pt tolerating scheduled tylenol & ibuprofen, will call for oxycodone when needed.  Liquid bandage in place.  Up ad symone and independent with baby cares.  Watched DVD's this evening.  Encouraged to call with questions or concerns.  Will continue to monitor.

## 2018-04-30 NOTE — PROGRESS NOTES
S: Patient doing well with no overnight issues and no current complaints.  Pain is well controlled.  Tolerating PO intake.  Breast feeding without issues.  Ambulating without difficulty.  Voiding and passing flatus.  Lochia is less than normal menses and decreasing.  Denies fevers, headaches, changes in vision, chest pain, SOB, nausea, vomiting, diarrhea, constipation, RUQ tenderness, dysuria, or LE pain.    O: VSS, Hb 8.7  NAD, AAOx3, RRR, CTAB, ABd soft NTND, Firm fundus 1cm below the umbilicus, LE NT no edema, incision c/d/i    A: 24yo  s/PPD#3 1LTCS who is recovering well.     P: Routine post partum care.  Plan for discharge today.  Male infant circ completed.  Appropriate drop in Hb, patient tolerating well. Continue iron.

## 2018-04-30 NOTE — PLAN OF CARE
Problem: Patient Care Overview  Goal: Plan of Care/Patient Progress Review  Outcome: Improving  VSS. Pain managed with Tylenol and Ibuprofen and Oxycodone x1. FFU1 with scant bleeding. Up independently, voiding without difficulty.  Incision is WDL. Breastfeeding every 2-3 hours.  Will continue to monitor.

## 2018-04-30 NOTE — PLAN OF CARE
Problem: Patient Care Overview  Goal: Plan of Care/Patient Progress Review  Outcome: Adequate for Discharge Date Met: 04/30/18  D: VSS, assessments WDL.   I: Pt. received complete discharge paperwork and home medications as filled by discharge pharmacy-ibuprofen and oxycodone. Breast pump given.  Pt. was given times of last dose for all discharge medications in writing on discharge medication sheets.  Discharge teaching included home medication, pain management, activity restrictions, postpartum cares, and signs and symptoms of infection.    A: Discharge outcomes on care plan met.  Mother states understanding and comfort with self and baby cares.  P: Pt. discharged to home.  Pt. was discharged with baby, and bands were checked at time of discharge.  Pt. was accompanied by , nurse and baby, and left with personal belongings.  Home care sent.  Pt. to follow up with OB per MD order.  Pt. had no further questions at the time of discharge and no unmet needs were identified.

## 2018-05-01 LAB — COPATH REPORT: NORMAL

## 2018-12-04 ENCOUNTER — ALLIED HEALTH/NURSE VISIT (OUTPATIENT)
Dept: FAMILY MEDICINE | Facility: OTHER | Age: 23
End: 2018-12-04
Payer: COMMERCIAL

## 2018-12-04 DIAGNOSIS — Z23 NEED FOR PROPHYLACTIC VACCINATION AND INOCULATION AGAINST INFLUENZA: Primary | ICD-10-CM

## 2018-12-04 PROCEDURE — 90686 IIV4 VACC NO PRSV 0.5 ML IM: CPT

## 2018-12-04 PROCEDURE — 90471 IMMUNIZATION ADMIN: CPT

## 2018-12-04 PROCEDURE — 99207 ZZC NO CHARGE NURSE ONLY: CPT

## 2018-12-04 NOTE — NURSING NOTE
Injectable Influenza Immunization Documentation      1.  Is the person to be vaccinated sick today?  No    2. Does the person to be vaccinated have an allergy to eggs or to a component of the vaccine?   No      3. Has the person to be vaccinated today ever had a serious reaction to influenza vaccine in the past?  No      4. Has the person to be vaccinated ever had Guillain-[place] syndrome?  No    Prior to injection verified patient identity using patient's name and date of birth.    Patient instructed to wait 20 minutes and report any reactions such as shortness of breath, swelling, itching to medical staff.     Form completed by Sultana Garvin MA

## 2018-12-04 NOTE — MR AVS SNAPSHOT
After Visit Summary   12/4/2018    Zelda Paulson    MRN: [id number]           Patient Information     Date Of Birth          1995        Visit Information        Provider Department      12/4/2018 11:00 AM NL FLU SHOT [facility]        Today's Diagnoses     Need for prophylactic vaccination and inoculation against influenza    -  1       Follow-ups after your visit        Who to contact     If you have questions or need follow up information about today's clinic visit or your schedule please contact [facility] directly at [phone].  Normal or non-critical lab and imaging results will be communicated to you by PlaceSpeakhart, letter or phone within 4 business days after the clinic has received the results. If you do not hear from us within 7 days, please contact the clinic through [employer] On[employer] or phone. If you have a critical or abnormal lab result, we will notify you by phone as soon as possible.  Submit refill requests through Ex[employer]et or call your pharmacy and they will forward the refill request to us. Please allow 3 business days for your refill to be completed.          Additional Information About Your Visit        MyChart Information     Ex[employer]et gives you secure access to your electronic health record. If you see a primary care provider, you can also send messages to your care team and make appointments. If you have questions, please call your primary care clinic.  If you do not have a primary care provider, please call [phone] and they will assist you.        Care EveryWhere ID     This is your Care EveryWhere ID. This could be used by other organizations to access your [facility] medical records  [id number]         Blood Pressure from Last 3 Encounters:   04/30/18 121/72   08/09/17 134/57   08/09/17 115/73    Weight from Last 3 Encounters:   04/27/18 240 lb (108.9 kg)   08/09/17 213 lb (96.6 kg)   08/08/17 213 lb (96.6 kg)              We Performed the  Following     FLU VACCINE, SPLIT VIRUS, IM (QUADRIVALENT) [08012]- >3 YRS     Vaccine Administration, Initial [04498]        Primary Care Provider Office Phone # Fax #    Jasmin Alonso [phone]       [facility] [address]  [facility] [address]        Equal Access to Services     DANIEL SORIANO : Hadii aad ku hadasho Soomaali, waaxda luqadaha, qaybta kaalmada adeegyada, waxay idiin hayaan adeeg khjuliaayad lameera . So [facility] [phone].    ATENCIÓN: Si habla español, tiene a shah disposición servicios gratuitos de asistencia lingüística. Angela al [phone].    We comply with applicable federal civil rights laws and [place] laws. We do not discriminate on the basis of race, color, national origin, age, disability, sex, sexual orientation, or gender identity.            Thank you!     Thank you for choosing [facility]  for your care. Our goal is always to provide you with excellent care. Hearing back from our patients is one way we can continue to improve our services. Please take a few minutes to complete the written survey that you may receive in the mail after your visit with us. Thank you!             Your Updated Medication List - Protect others around you: Learn how to safely use, store and throw away your medicines at www.disposemymeds.org.          This list is accurate as of 12/4/18 12:34 PM.  Always use your most recent med list.                   Brand Name Dispense Instructions for use Diagnosis    albuterol 108 (90 Base) MCG/ACT inhaler    PROAIR HFA/PROVENTIL HFA/VENTOLIN HFA     Inhale 2 puffs into the lungs every 4 hours as needed for shortness of breath / dyspnea or wheezing        HYDROcodone-acetaminophen 5-325 MG tablet    NORCO    15 tablet    Take 1-2 tablets by mouth every 4 hours as needed for moderate to severe pain        ibuprofen 600 MG tablet    ADVIL/MOTRIN    30 tablet    Take 1 tablet (600 mg) by mouth every 6 hours as needed for other  (carmping)     delivery delivered       IRON SUPPLEMENT PO           LEVOTHYROXINE SODIUM PO      Take 25 mcg by mouth daily        metFORMIN 500 MG tablet    GLUCOPHAGE     Take 500 mg by mouth 2 times daily (with meals)        norethindrone-ethinyl estradiol 1-20 MG-MCG tablet    JUNEL FE      Take 1 tablet by mouth daily        oxyCODONE 5 MG tablet    ROXICODONE    28 tablet    Take 1-2 tablets (5-10 mg) by mouth every 3 hours as needed for other (pain control or improvement in physical function. Hold dose for analgesic side effects.)    Previous  delivery, antepartum condition or complication

## 2019-01-06 ENCOUNTER — APPOINTMENT (OUTPATIENT)
Dept: ULTRASOUND IMAGING | Facility: CLINIC | Age: 24
End: 2019-01-06
Payer: COMMERCIAL

## 2019-01-06 ENCOUNTER — HOSPITAL ENCOUNTER (EMERGENCY)
Facility: CLINIC | Age: 24
Discharge: HOME | End: 2019-01-06
Attending: EMERGENCY MEDICINE | Admitting: EMERGENCY MEDICINE
Payer: COMMERCIAL

## 2019-01-06 VITALS
TEMPERATURE: 99 F | BODY MASS INDEX: 41.54 KG/M2 | RESPIRATION RATE: 20 BRPM | SYSTOLIC BLOOD PRESSURE: 125 MMHG | HEIGHT: 61 IN | DIASTOLIC BLOOD PRESSURE: 64 MMHG | WEIGHT: 220 LBS | OXYGEN SATURATION: 99 %

## 2019-01-06 DIAGNOSIS — K80.50 BILIARY COLIC: ICD-10-CM

## 2019-01-06 LAB
ALBUMIN SERPL-MCNC: 3.6 G/DL (ref 3.4–5)
ALBUMIN UR-MCNC: 10 MG/DL
ALP SERPL-CCNC: 146 U/L (ref 40–150)
ALT SERPL W P-5'-P-CCNC: 496 U/L (ref 0–50)
AMORPH CRY #/AREA URNS HPF: ABNORMAL /HPF
ANION GAP SERPL CALCULATED.3IONS-SCNC: 7 MMOL/L (ref 3–14)
APPEARANCE UR: ABNORMAL
AST SERPL W P-5'-P-CCNC: 303 U/L (ref 0–45)
BASOPHILS # BLD AUTO: 0 10E9/L (ref 0–0.2)
BASOPHILS NFR BLD AUTO: 0.1 %
BILIRUB SERPL-MCNC: 0.3 MG/DL (ref 0.2–1.3)
BILIRUB UR QL STRIP: NEGATIVE
BUN SERPL-MCNC: 12 MG/DL (ref 7–30)
CALCIUM SERPL-MCNC: 8.4 MG/DL (ref 8.5–10.1)
CHLORIDE SERPL-SCNC: 107 MMOL/L (ref 94–109)
CO2 SERPL-SCNC: 27 MMOL/L (ref 20–32)
COLOR UR AUTO: YELLOW
CREAT SERPL-MCNC: 0.83 MG/DL (ref 0.52–1.04)
DIFFERENTIAL METHOD BLD: ABNORMAL
EOSINOPHIL # BLD AUTO: 0.1 10E9/L (ref 0–0.7)
EOSINOPHIL NFR BLD AUTO: 1 %
ERYTHROCYTE [DISTWIDTH] IN BLOOD BY AUTOMATED COUNT: 14.2 % (ref 10–15)
GFR SERPL CREATININE-BSD FRML MDRD: >90 ML/MIN/{1.73_M2}
GLUCOSE SERPL-MCNC: 75 MG/DL (ref 70–99)
GLUCOSE UR STRIP-MCNC: NEGATIVE MG/DL
HCG UR QL: NEGATIVE
HCT VFR BLD AUTO: 37.4 % (ref 35–47)
HGB BLD-MCNC: 11.7 G/DL (ref 11.7–15.7)
HGB UR QL STRIP: NEGATIVE
IMM GRANULOCYTES # BLD: 0 10E9/L (ref 0–0.4)
IMM GRANULOCYTES NFR BLD: 0.1 %
KETONES UR STRIP-MCNC: NEGATIVE MG/DL
LEUKOCYTE ESTERASE UR QL STRIP: NEGATIVE
LIPASE SERPL-CCNC: 152 U/L (ref 73–393)
LYMPHOCYTES # BLD AUTO: 2.3 10E9/L (ref 0.8–5.3)
LYMPHOCYTES NFR BLD AUTO: 32.7 %
MCH RBC QN AUTO: 26.1 PG (ref 26.5–33)
MCHC RBC AUTO-ENTMCNC: 31.3 G/DL (ref 31.5–36.5)
MCV RBC AUTO: 84 FL (ref 78–100)
MONOCYTES # BLD AUTO: 0.6 10E9/L (ref 0–1.3)
MONOCYTES NFR BLD AUTO: 7.7 %
MUCOUS THREADS #/AREA URNS LPF: PRESENT /LPF
NEUTROPHILS # BLD AUTO: 4.2 10E9/L (ref 1.6–8.3)
NEUTROPHILS NFR BLD AUTO: 58.4 %
NITRATE UR QL: NEGATIVE
NRBC # BLD AUTO: 0 10*3/UL
NRBC BLD AUTO-RTO: 0 /100
PH UR STRIP: 6.5 PH (ref 5–7)
PLATELET # BLD AUTO: 303 10E9/L (ref 150–450)
POTASSIUM SERPL-SCNC: 3.5 MMOL/L (ref 3.4–5.3)
PROT SERPL-MCNC: 7.7 G/DL (ref 6.8–8.8)
RBC # BLD AUTO: 4.48 10E12/L (ref 3.8–5.2)
RBC #/AREA URNS AUTO: 6 /HPF (ref 0–2)
SODIUM SERPL-SCNC: 141 MMOL/L (ref 133–144)
SOURCE: ABNORMAL
SP GR UR STRIP: 1.03 (ref 1–1.03)
SQUAMOUS #/AREA URNS AUTO: 4 /HPF (ref 0–1)
UROBILINOGEN UR STRIP-MCNC: 2 MG/DL (ref 0–2)
WBC # BLD AUTO: 7.2 10E9/L (ref 4–11)
WBC #/AREA URNS AUTO: 0 /HPF (ref 0–5)

## 2019-01-06 PROCEDURE — 25000128 H RX IP 250 OP 636: Performed by: EMERGENCY MEDICINE

## 2019-01-06 PROCEDURE — 81001 URINALYSIS AUTO W/SCOPE: CPT | Performed by: EMERGENCY MEDICINE

## 2019-01-06 PROCEDURE — 96361 HYDRATE IV INFUSION ADD-ON: CPT

## 2019-01-06 PROCEDURE — 83690 ASSAY OF LIPASE: CPT | Performed by: EMERGENCY MEDICINE

## 2019-01-06 PROCEDURE — 80053 COMPREHEN METABOLIC PANEL: CPT | Performed by: EMERGENCY MEDICINE

## 2019-01-06 PROCEDURE — 96374 THER/PROPH/DIAG INJ IV PUSH: CPT

## 2019-01-06 PROCEDURE — 76705 ECHO EXAM OF ABDOMEN: CPT

## 2019-01-06 PROCEDURE — 85025 COMPLETE CBC W/AUTO DIFF WBC: CPT | Performed by: EMERGENCY MEDICINE

## 2019-01-06 PROCEDURE — 81025 URINE PREGNANCY TEST: CPT | Performed by: EMERGENCY MEDICINE

## 2019-01-06 PROCEDURE — 96375 TX/PRO/DX INJ NEW DRUG ADDON: CPT

## 2019-01-06 PROCEDURE — 99285 EMERGENCY DEPT VISIT HI MDM: CPT | Mod: 25

## 2019-01-06 RX ORDER — HYDROCODONE BITARTRATE AND ACETAMINOPHEN 5; 325 MG/1; MG/1
1 TABLET ORAL EVERY 6 HOURS PRN
Qty: 10 TABLET | Refills: 0 | Status: ON HOLD | OUTPATIENT
Start: 2019-01-06 | End: 2019-01-09

## 2019-01-06 RX ORDER — HYDROMORPHONE HYDROCHLORIDE 1 MG/ML
0.5 INJECTION, SOLUTION INTRAMUSCULAR; INTRAVENOUS; SUBCUTANEOUS
Status: COMPLETED | OUTPATIENT
Start: 2019-01-06 | End: 2019-01-06

## 2019-01-06 RX ORDER — ONDANSETRON 2 MG/ML
4 INJECTION INTRAMUSCULAR; INTRAVENOUS EVERY 30 MIN PRN
Status: DISCONTINUED | OUTPATIENT
Start: 2019-01-06 | End: 2019-01-06 | Stop reason: HOSPADM

## 2019-01-06 RX ORDER — ONDANSETRON 4 MG/1
4 TABLET, ORALLY DISINTEGRATING ORAL EVERY 8 HOURS PRN
Qty: 10 TABLET | Refills: 0 | Status: SHIPPED | OUTPATIENT
Start: 2019-01-06 | End: 2019-01-09

## 2019-01-06 RX ADMIN — ONDANSETRON 4 MG: 2 INJECTION INTRAMUSCULAR; INTRAVENOUS at 17:53

## 2019-01-06 RX ADMIN — SODIUM CHLORIDE 1000 ML: 9 INJECTION, SOLUTION INTRAVENOUS at 17:53

## 2019-01-06 RX ADMIN — Medication 0.5 MG: at 17:55

## 2019-01-06 ASSESSMENT — MIFFLIN-ST. JEOR: SCORE: 1690.29

## 2019-01-06 ASSESSMENT — ENCOUNTER SYMPTOMS
VOMITING: 1
ABDOMINAL PAIN: 1
FEVER: 0
SHORTNESS OF BREATH: 1

## 2019-01-06 NOTE — H&P (VIEW-ONLY)
"  History     Chief Complaint:  Abdominal Pain    HPI   Zelda Paulson is a 23 year old female who presents to the emergency department for evaluation of abdominal pain. She reports intermittent RUQ pain since . It first presented the morning after she had had a large dinner; she reports waking up to pain directly under her right rib cage with associated dyspnea. The symptoms resolved on their own at this time, so she attributed it to gas and did not present for evaluation. When the same pain returned this weekend with the addition of vomiting after eating, she contacted a PA friend at a surgical consultant group who told her it was her gallbladder and she should present to the ED. Here, she reports additional dark orange urine. She denies any other symptoms. She does report similar pain after her son was born, but was not evaluated at that time. She does note a history of kidney stones.     Allergies:  Fentanyl  Morphine  Sulfa     Medications:    Levothyroxine  Metformin  Oral birth control    Past Medical History:    CKD  Asthma  PCOS    Past Surgical History:    Appendectomy    ENT surgery    Family History:    Anxiety & Depression    Social History:  Marital Status:   [2]  Mother at bedside.   Negative for tobacco use.  Negative for alcohol use.  Negative for drug use.      Review of Systems   Constitutional: Negative for fever.   Respiratory: Positive for shortness of breath.    Gastrointestinal: Positive for abdominal pain and vomiting.   Genitourinary:        Orange urine     All other systems reviewed and are negative.        Physical Exam     Patient Vitals for the past 24 hrs:   BP Temp Temp src Heart Rate Resp SpO2 Height Weight   19 1706 125/64 99  F (37.2  C) Oral 93 20 99 % 1.549 m (5' 1\") 99.8 kg (220 lb)     Physical Exam  Vitals: reviewed by me  General: Pt seen on \[facility]\"", pleasant, cooperative, and alert to conversation  Eyes: Tracking well, clear conjunctiva " BL  ENT: MMM, midline trachea.   Lungs:  No tachypnea, no accessory muscle use. No respiratory distress.   CV: Rate as above, regular rhythm.    Abd: Soft, right upper quadrant does have some tenderness to palpation, negative Dickey's, no guarding, no rebound.  MSK: no peripheral edema or joint effusion.  No evidence of trauma  Skin: No rash, normal turgor and temperature  Neuro: Clear speech and no facial droop.  Psych: Not RIS, no e/o AH/VH        Emergency Department Course   Imaging:  Radiographic findings were communicated with the patient who voiced understanding of the findings.    US Abdomen, RUQ:  1. Cholelithiasis without evidence for acute cholecystitis.  2. Mild dilatation of the common bile duct measuring up to 0.8 cm.  Normal is up to 0.6 cm. No choledocholithiasis or obvious mass in the  head of the pancreas is identified.  3. Mild diffuse fatty infiltration of the liver, as per radiology.     Laboratory:  CBC: WBC: 7.2, HGB: 11.7, PLT: 303    CMP: Ca: 8.4 (L), ALT: 496 (H), AST: 303 (H), o/w WNL (Creatinine: 0.83)    Lipase: 152    UA with Microscopic: Albumin: 10 (A), RBC: 6 (H), Skin cells: 4 (H), Mucous: Present (A), Amorphous crystals: Few (A), o/w WNL    HCG: Negative    Interventions:  1753 NS 1L IV   Zofran, 4 mg, IV injection   Dilaudid, 0.5 mg, IV injection    Emergency Department Course:  Nursing notes and vitals reviewed.     (1715) I performed an exam of the patient as documented above.      IV inserted. Medicine administered as documented above. Blood drawn. This was sent to the lab for further testing, results above.     The patient was sent for an abdomen US while in the emergency department, findings above.      The patient provided a urine sample here in the emergency department. This was sent for laboratory testing, findings above.     (1921) I rechecked the patient and discussed the results of her workup thus far. I gave her instructions to follow up with surgery regarding  cholecystectomy.      Findings and plan explained to the Patient. Patient discharged home with instructions regarding supportive care, medications, and reasons to return. The importance of close follow-up was reviewed. The patient was prescribed Zofran and Norco.      I personally reviewed the imaging and laboratory results with the Patient and answered all related questions prior to discharge.     Impression & Plan      Medical Decision Making:  Zelda Paulson is a 23 year old female who presents to the emergency room with what appears to be gallstones. Patient has an ultrasound which shows gallstones with no evidence of cholecystitis. Patient does have minimal tenderness to her abdomen, but most of her pain has gone. She is tolerating orals, has no fever here, no white count, and no evidence of obstruction. I do think that she did likely pass a rather large stone as her pain abated after being quite severe, exactly around 3AM this morning. No pain since then, but she did want to get checked out. 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 Will have patient follow up with her physician as above. Mother at bedside also was very supportive, and patient knows to come back if her pain meds are not meeting her needs, or if she develops a fever or other signs of infection. Okay for discharge at this time.     Critical Care time:  none    Diagnosis:    ICD-10-CM    1. Biliary colic K80.50        Disposition:  discharged to home    Discharge Medications:     Medication List      Started    ondansetron 4 MG ODT tab  Commonly known as:  ZOFRAN ODT  4 mg, Oral, EVERY 8 HOURS PRN        Modified    * HYDROcodone-acetaminophen 5-325 MG tablet  Commonly known as:  NORCO  1-2 tablets, Oral, EVERY 4 HOURS PRN  What changed:  Another medication with the same name was added. Make sure you understand how and when to take  each.          Scribe Disclosure:  I, Nancy Sergio, am serving as a scribe on 1/6/2019 at 5:15 PM to personally document services performed by Dewey Mcfarland MD based on my observations and the provider's statements to me.     Nancy Sergio  1/6/2019    EMERGENCY DEPARTMENT       Dewey Mcfarland MD  01/06/19 [id number]

## 2019-01-06 NOTE — ED PROVIDER NOTES
"  History     Chief Complaint:  Abdominal Pain    HPI   Zelda Paulson is a 23 year old female who presents to the emergency department for evaluation of abdominal pain. She reports intermittent RUQ pain since . It first presented the morning after she had had a large dinner; she reports waking up to pain directly under her right rib cage with associated dyspnea. The symptoms resolved on their own at this time, so she attributed it to gas and did not present for evaluation. When the same pain returned this weekend with the addition of vomiting after eating, she contacted a PA friend at a surgical consultant group who told her it was her gallbladder and she should present to the ED. Here, she reports additional dark orange urine. She denies any other symptoms. She does report similar pain after her son was born, but was not evaluated at that time. She does note a history of kidney stones.     Allergies:  Fentanyl  Morphine  Sulfa     Medications:    Levothyroxine  Metformin  Oral birth control    Past Medical History:    CKD  Asthma  PCOS    Past Surgical History:    Appendectomy    ENT surgery    Family History:    Anxiety & Depression    Social History:  Marital Status:   [2]  Mother at bedside.   Negative for tobacco use.  Negative for alcohol use.  Negative for drug use.      Review of Systems   Constitutional: Negative for fever.   Respiratory: Positive for shortness of breath.    Gastrointestinal: Positive for abdominal pain and vomiting.   Genitourinary:        Orange urine     All other systems reviewed and are negative.        Physical Exam     Patient Vitals for the past 24 hrs:   BP Temp Temp src Heart Rate Resp SpO2 Height Weight   19 1706 125/64 99  F (37.2  C) Oral 93 20 99 % 1.549 m (5' 1\") 99.8 kg (220 lb)     Physical Exam  Vitals: reviewed by me  General: Pt seen on [facility], pleasant, cooperative, and alert to conversation  Eyes: Tracking well, clear conjunctiva " BL  ENT: MMM, midline trachea.   Lungs:  No tachypnea, no accessory muscle use. No respiratory distress.   CV: Rate as above, regular rhythm.    Abd: Soft, right upper quadrant does have some tenderness to palpation, negative Dickey's, no guarding, no rebound.  MSK: no peripheral edema or joint effusion.  No evidence of trauma  Skin: No rash, normal turgor and temperature  Neuro: Clear speech and no facial droop.  Psych: Not RIS, no e/o AH/VH        Emergency Department Course   Imaging:  Radiographic findings were communicated with the patient who voiced understanding of the findings.    US Abdomen, RUQ:  1. Cholelithiasis without evidence for acute cholecystitis.  2. Mild dilatation of the common bile duct measuring up to 0.8 cm.  Normal is up to 0.6 cm. No choledocholithiasis or obvious mass in the  head of the pancreas is identified.  3. Mild diffuse fatty infiltration of the liver, as per radiology.     Laboratory:  CBC: WBC: 7.2, HGB: 11.7, PLT: 303    CMP: Ca: 8.4 (L), ALT: 496 (H), AST: 303 (H), o/w WNL (Creatinine: 0.83)    Lipase: 152    UA with Microscopic: Albumin: 10 (A), RBC: 6 (H), Skin cells: 4 (H), Mucous: Present (A), Amorphous crystals: Few (A), o/w WNL    HCG: Negative    Interventions:  1753 NS 1L IV   Zofran, 4 mg, IV injection   Dilaudid, 0.5 mg, IV injection    Emergency Department Course:  Nursing notes and vitals reviewed.     (1715) I performed an exam of the patient as documented above.      IV inserted. Medicine administered as documented above. Blood drawn. This was sent to the lab for further testing, results above.     The patient was sent for an abdomen US while in the emergency department, findings above.      The patient provided a urine sample here in the emergency department. This was sent for laboratory testing, findings above.     (1921) I rechecked the patient and discussed the results of her workup thus far. I gave her instructions to follow up with surgery regarding  cholecystectomy.      Findings and plan explained to the Patient. Patient discharged home with instructions regarding supportive care, medications, and reasons to return. The importance of close follow-up was reviewed. The patient was prescribed Zofran and Norco.      I personally reviewed the imaging and laboratory results with the Patient and answered all related questions prior to discharge.     Impression & Plan      Medical Decision Making:  Zelda Paulson is a 23 year old female who presents to the emergency room with what appears to be gallstones. Patient has an ultrasound which shows gallstones with no evidence of cholecystitis. Patient does have minimal tenderness to her abdomen, but most of her pain has gone. She is tolerating orals, has no fever here, no white count, and no evidence of obstruction. I do think that she did likely pass a rather large stone as her pain abated after being quite severe, exactly around 3AM this morning. No pain since then, but she did want to get checked out. 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 Will have patient follow up with her physician as above. Mother at bedside also was very supportive, and patient knows to come back if her pain meds are not meeting her needs, or if she develops a fever or other signs of infection. Okay for discharge at this time.     Critical Care time:  none    Diagnosis:    ICD-10-CM    1. Biliary colic K80.50        Disposition:  discharged to home    Discharge Medications:     Medication List      Started    ondansetron 4 MG ODT tab  Commonly known as:  ZOFRAN ODT  4 mg, Oral, EVERY 8 HOURS PRN        Modified    * HYDROcodone-acetaminophen 5-325 MG tablet  Commonly known as:  NORCO  1-2 tablets, Oral, EVERY 4 HOURS PRN  What changed:  Another medication with the same name was added. Make sure you understand how and when to take  each.          Scribe Disclosure:  I, Nancy Sergio, am serving as a scribe on 1/6/2019 at 5:15 PM to personally document services performed by Dewey Mcfarland MD based on my observations and the provider's statements to me.     Nancy Sergio  1/6/2019    EMERGENCY DEPARTMENT       Dewey Mcfarland MD  01/06/19 6868

## 2019-01-06 NOTE — ED AVS SNAPSHOT
Emergency Department  [address] [facility] [address]  Phone:  [phone]  Fax:  [phone]                                    Zelda Paulson   MRN: [id number]    Department:   Emergency Department   Date of Visit:  1/6/2019           After Visit Summary Signature Page    I have received my discharge instructions, and my questions have been answered. I have discussed any challenges I see with this plan with the nurse or doctor.    ..........................................................................................................................................  Patient/Patient Representative Signature      ..........................................................................................................................................  Patient Representative Print Name and Relationship to Patient    ..................................................               ................................................  Date                                   Time    ..........................................................................................................................................  Reviewed by Signature/Title    ...................................................              ..............................................  Date                                               Time          22EPIC Rev 08/18

## 2019-01-06 NOTE — LETTER
January 6, 2019      To Whom It May Concern:      Zelda Paulson was seen in our Emergency Department today, 01/06/19.  I expect her condition to improve over the next 2-3 days.  She may return to work when improved.    Sincerely,        Douglas DOLL RN

## 2019-01-07 ENCOUNTER — TELEPHONE (OUTPATIENT)
Dept: SURGERY | Facility: CLINIC | Age: 24
End: 2019-01-07

## 2019-01-07 NOTE — TELEPHONE ENCOUNTER
Type of surgery: Lap rudy, possible cholangiogram  Location of surgery: [facility]  Date and time of surgery: 1/9/19 at 11:45am  Surgeon: Dr. Valentín Butler  Pre-Op Appt Date: Patient to schedule  Post-Op Appt Date: Patient to schedule   Packet sent out: Yes  Pre-cert/Authorization completed:  Not Applicable  Date: 1/7/18

## 2019-01-09 ENCOUNTER — APPOINTMENT (OUTPATIENT)
Dept: GENERAL RADIOLOGY | Facility: CLINIC | Age: 24
End: 2019-01-09
Attending: SURGERY
Payer: COMMERCIAL

## 2019-01-09 ENCOUNTER — ANESTHESIA (OUTPATIENT)
Dept: SURGERY | Facility: CLINIC | Age: 24
End: 2019-01-09
Payer: COMMERCIAL

## 2019-01-09 ENCOUNTER — ANESTHESIA EVENT (OUTPATIENT)
Dept: SURGERY | Facility: CLINIC | Age: 24
End: 2019-01-09
Payer: COMMERCIAL

## 2019-01-09 ENCOUNTER — HOSPITAL ENCOUNTER (OUTPATIENT)
Facility: CLINIC | Age: 24
Discharge: HOME | End: 2019-01-09
Attending: SURGERY | Admitting: SURGERY
Payer: COMMERCIAL

## 2019-01-09 ENCOUNTER — OFFICE VISIT (OUTPATIENT)
Dept: SURGERY | Facility: PHYSICIAN GROUP | Age: 24
End: 2019-01-09
Payer: COMMERCIAL

## 2019-01-09 VITALS
SYSTOLIC BLOOD PRESSURE: 111 MMHG | WEIGHT: 220.3 LBS | BODY MASS INDEX: 41.59 KG/M2 | TEMPERATURE: 97.8 F | DIASTOLIC BLOOD PRESSURE: 67 MMHG | HEART RATE: 76 BPM | RESPIRATION RATE: 19 BRPM | OXYGEN SATURATION: 92 % | HEIGHT: 61 IN

## 2019-01-09 DIAGNOSIS — K80.20 CALCULUS OF GALLBLADDER WITHOUT CHOLECYSTITIS WITHOUT OBSTRUCTION: Primary | ICD-10-CM

## 2019-01-09 DIAGNOSIS — Z90.49 S/P LAPAROSCOPIC CHOLECYSTECTOMY: Primary | ICD-10-CM

## 2019-01-09 LAB — HCG UR QL: NEGATIVE

## 2019-01-09 PROCEDURE — 36000093 ZZH SURGERY LEVEL 4 1ST 30 MIN: Performed by: SURGERY

## 2019-01-09 PROCEDURE — 25000132 ZZH RX MED GY IP 250 OP 250 PS 637: Performed by: ANESTHESIOLOGY

## 2019-01-09 PROCEDURE — 71000027 ZZH RECOVERY PHASE 2 EACH 15 MINS: Performed by: SURGERY

## 2019-01-09 PROCEDURE — 40000277 XR SURGERY CARM FLUORO LESS THAN 5 MIN W STILLS

## 2019-01-09 PROCEDURE — 25000128 H RX IP 250 OP 636: Performed by: ANESTHESIOLOGY

## 2019-01-09 PROCEDURE — 37000008 ZZH ANESTHESIA TECHNICAL FEE, 1ST 30 MIN: Performed by: SURGERY

## 2019-01-09 PROCEDURE — 25000128 H RX IP 250 OP 636: Performed by: NURSE ANESTHETIST, CERTIFIED REGISTERED

## 2019-01-09 PROCEDURE — 71000013 ZZH RECOVERY PHASE 1 LEVEL 1 EA ADDTL HR: Performed by: SURGERY

## 2019-01-09 PROCEDURE — 88304 TISSUE EXAM BY PATHOLOGIST: CPT | Mod: 26 | Performed by: SURGERY

## 2019-01-09 PROCEDURE — 88304 TISSUE EXAM BY PATHOLOGIST: CPT | Performed by: SURGERY

## 2019-01-09 PROCEDURE — 71000012 ZZH RECOVERY PHASE 1 LEVEL 1 FIRST HR: Performed by: SURGERY

## 2019-01-09 PROCEDURE — 27210794 ZZH OR GENERAL SUPPLY STERILE: Performed by: SURGERY

## 2019-01-09 PROCEDURE — 25000566 ZZH SEVOFLURANE, EA 15 MIN: Performed by: SURGERY

## 2019-01-09 PROCEDURE — 37000009 ZZH ANESTHESIA TECHNICAL FEE, EACH ADDTL 15 MIN: Performed by: SURGERY

## 2019-01-09 PROCEDURE — 25000125 ZZHC RX 250: Performed by: NURSE ANESTHETIST, CERTIFIED REGISTERED

## 2019-01-09 PROCEDURE — 25000125 ZZHC RX 250: Performed by: SURGERY

## 2019-01-09 PROCEDURE — 25000128 H RX IP 250 OP 636: Performed by: SURGERY

## 2019-01-09 PROCEDURE — 36000063 ZZH SURGERY LEVEL 4 EA 15 ADDTL MIN: Performed by: SURGERY

## 2019-01-09 PROCEDURE — 40000170 ZZH STATISTIC PRE-PROCEDURE ASSESSMENT II: Performed by: SURGERY

## 2019-01-09 PROCEDURE — 47563 LAPARO CHOLECYSTECTOMY/GRAPH: CPT | Mod: AS | Performed by: PHYSICIAN ASSISTANT

## 2019-01-09 PROCEDURE — 25000132 ZZH RX MED GY IP 250 OP 250 PS 637: Performed by: SURGERY

## 2019-01-09 PROCEDURE — 81025 URINE PREGNANCY TEST: CPT | Performed by: ANESTHESIOLOGY

## 2019-01-09 PROCEDURE — 47563 LAPARO CHOLECYSTECTOMY/GRAPH: CPT | Performed by: SURGERY

## 2019-01-09 RX ORDER — ONDANSETRON 2 MG/ML
INJECTION INTRAMUSCULAR; INTRAVENOUS PRN
Status: DISCONTINUED | OUTPATIENT
Start: 2019-01-09 | End: 2019-01-09

## 2019-01-09 RX ORDER — NALOXONE HYDROCHLORIDE 0.4 MG/ML
.1-.4 INJECTION, SOLUTION INTRAMUSCULAR; INTRAVENOUS; SUBCUTANEOUS
Status: DISCONTINUED | OUTPATIENT
Start: 2019-01-09 | End: 2019-01-09 | Stop reason: HOSPADM

## 2019-01-09 RX ORDER — OXYCODONE AND ACETAMINOPHEN 5; 325 MG/1; MG/1
1-2 TABLET ORAL EVERY 6 HOURS PRN
Qty: 18 TABLET | Refills: 0 | Status: SHIPPED | OUTPATIENT
Start: 2019-01-09 | End: 2019-01-12

## 2019-01-09 RX ORDER — HYDROMORPHONE HYDROCHLORIDE 1 MG/ML
.3-.5 INJECTION, SOLUTION INTRAMUSCULAR; INTRAVENOUS; SUBCUTANEOUS EVERY 10 MIN PRN
Status: DISCONTINUED | OUTPATIENT
Start: 2019-01-09 | End: 2019-01-09 | Stop reason: HOSPADM

## 2019-01-09 RX ORDER — BUPIVACAINE HYDROCHLORIDE AND EPINEPHRINE 2.5; 5 MG/ML; UG/ML
INJECTION, SOLUTION INFILTRATION; PERINEURAL PRN
Status: DISCONTINUED | OUTPATIENT
Start: 2019-01-09 | End: 2019-01-09 | Stop reason: HOSPADM

## 2019-01-09 RX ORDER — HYDROCODONE BITARTRATE AND ACETAMINOPHEN 5; 325 MG/1; MG/1
1-2 TABLET ORAL
Status: CANCELLED | OUTPATIENT
Start: 2019-01-09

## 2019-01-09 RX ORDER — DIPHENHYDRAMINE HCL 25 MG
25 CAPSULE ORAL ONCE
Status: COMPLETED | OUTPATIENT
Start: 2019-01-09 | End: 2019-01-09

## 2019-01-09 RX ORDER — OXYCODONE AND ACETAMINOPHEN 5; 325 MG/1; MG/1
1-2 TABLET ORAL
Status: DISCONTINUED | OUTPATIENT
Start: 2019-01-09 | End: 2019-01-09 | Stop reason: HOSPADM

## 2019-01-09 RX ORDER — ONDANSETRON 4 MG/1
4 TABLET, ORALLY DISINTEGRATING ORAL EVERY 30 MIN PRN
Status: DISCONTINUED | OUTPATIENT
Start: 2019-01-09 | End: 2019-01-09 | Stop reason: HOSPADM

## 2019-01-09 RX ORDER — CEFAZOLIN SODIUM 1 G/3ML
1 INJECTION, POWDER, FOR SOLUTION INTRAMUSCULAR; INTRAVENOUS SEE ADMIN INSTRUCTIONS
Status: DISCONTINUED | OUTPATIENT
Start: 2019-01-09 | End: 2019-01-09 | Stop reason: HOSPADM

## 2019-01-09 RX ORDER — CEFAZOLIN SODIUM 2 G/100ML
2 INJECTION, SOLUTION INTRAVENOUS
Status: COMPLETED | OUTPATIENT
Start: 2019-01-09 | End: 2019-01-09

## 2019-01-09 RX ORDER — LIDOCAINE HYDROCHLORIDE 20 MG/ML
INJECTION, SOLUTION INFILTRATION; PERINEURAL PRN
Status: DISCONTINUED | OUTPATIENT
Start: 2019-01-09 | End: 2019-01-09

## 2019-01-09 RX ORDER — DIPHENHYDRAMINE HCL 25 MG
25 TABLET ORAL ONCE
Status: DISCONTINUED | OUTPATIENT
Start: 2019-01-09 | End: 2019-01-09 | Stop reason: CLARIF

## 2019-01-09 RX ORDER — HYDROCODONE BITARTRATE AND ACETAMINOPHEN 5; 325 MG/1; MG/1
1-2 TABLET ORAL EVERY 4 HOURS PRN
Qty: 20 TABLET | Refills: 0 | Status: SHIPPED | OUTPATIENT
Start: 2019-01-09 | End: 2019-01-09

## 2019-01-09 RX ORDER — PROPOFOL 10 MG/ML
INJECTION, EMULSION INTRAVENOUS CONTINUOUS PRN
Status: DISCONTINUED | OUTPATIENT
Start: 2019-01-09 | End: 2019-01-09

## 2019-01-09 RX ORDER — SODIUM CHLORIDE, SODIUM LACTATE, POTASSIUM CHLORIDE, CALCIUM CHLORIDE 600; 310; 30; 20 MG/100ML; MG/100ML; MG/100ML; MG/100ML
INJECTION, SOLUTION INTRAVENOUS CONTINUOUS
Status: DISCONTINUED | OUTPATIENT
Start: 2019-01-09 | End: 2019-01-09 | Stop reason: HOSPADM

## 2019-01-09 RX ORDER — SODIUM CHLORIDE, SODIUM LACTATE, POTASSIUM CHLORIDE, CALCIUM CHLORIDE 600; 310; 30; 20 MG/100ML; MG/100ML; MG/100ML; MG/100ML
INJECTION, SOLUTION INTRAVENOUS CONTINUOUS PRN
Status: DISCONTINUED | OUTPATIENT
Start: 2019-01-09 | End: 2019-01-09

## 2019-01-09 RX ORDER — HYDROCODONE BITARTRATE AND ACETAMINOPHEN 5; 325 MG/1; MG/1
1 TABLET ORAL ONCE
Status: COMPLETED | OUTPATIENT
Start: 2019-01-09 | End: 2019-01-09

## 2019-01-09 RX ORDER — ONDANSETRON 2 MG/ML
4 INJECTION INTRAMUSCULAR; INTRAVENOUS EVERY 30 MIN PRN
Status: DISCONTINUED | OUTPATIENT
Start: 2019-01-09 | End: 2019-01-09 | Stop reason: HOSPADM

## 2019-01-09 RX ORDER — KETOROLAC TROMETHAMINE 30 MG/ML
INJECTION, SOLUTION INTRAMUSCULAR; INTRAVENOUS PRN
Status: DISCONTINUED | OUTPATIENT
Start: 2019-01-09 | End: 2019-01-09

## 2019-01-09 RX ORDER — GLYCOPYRROLATE 0.2 MG/ML
INJECTION, SOLUTION INTRAMUSCULAR; INTRAVENOUS PRN
Status: DISCONTINUED | OUTPATIENT
Start: 2019-01-09 | End: 2019-01-09

## 2019-01-09 RX ORDER — PROPOFOL 10 MG/ML
INJECTION, EMULSION INTRAVENOUS PRN
Status: DISCONTINUED | OUTPATIENT
Start: 2019-01-09 | End: 2019-01-09

## 2019-01-09 RX ORDER — BUPIVACAINE HYDROCHLORIDE 2.5 MG/ML
INJECTION, SOLUTION EPIDURAL; INFILTRATION; INTRACAUDAL
Status: DISCONTINUED
Start: 2019-01-09 | End: 2019-01-09 | Stop reason: HOSPADM

## 2019-01-09 RX ORDER — DEXAMETHASONE SODIUM PHOSPHATE 4 MG/ML
INJECTION, SOLUTION INTRA-ARTICULAR; INTRALESIONAL; INTRAMUSCULAR; INTRAVENOUS; SOFT TISSUE PRN
Status: DISCONTINUED | OUTPATIENT
Start: 2019-01-09 | End: 2019-01-09

## 2019-01-09 RX ORDER — AMOXICILLIN 250 MG
1-2 CAPSULE ORAL 2 TIMES DAILY PRN
Qty: 30 TABLET | Refills: 0 | Status: SHIPPED | OUTPATIENT
Start: 2019-01-09 | End: 2019-02-08

## 2019-01-09 RX ORDER — ACETAMINOPHEN 325 MG/1
650 TABLET ORAL ONCE
Status: COMPLETED | OUTPATIENT
Start: 2019-01-09 | End: 2019-01-09

## 2019-01-09 RX ORDER — NEOSTIGMINE METHYLSULFATE 1 MG/ML
VIAL (ML) INJECTION PRN
Status: DISCONTINUED | OUTPATIENT
Start: 2019-01-09 | End: 2019-01-09

## 2019-01-09 RX ORDER — EPINEPHRINE 1 MG/ML
INJECTION, SOLUTION INTRAMUSCULAR; SUBCUTANEOUS
Status: DISCONTINUED
Start: 2019-01-09 | End: 2019-01-09 | Stop reason: HOSPADM

## 2019-01-09 RX ADMIN — ROCURONIUM BROMIDE 30 MG: 10 INJECTION INTRAVENOUS at 11:40

## 2019-01-09 RX ADMIN — PHENYLEPHRINE HYDROCHLORIDE 200 MCG: 10 INJECTION, SOLUTION INTRAMUSCULAR; INTRAVENOUS; SUBCUTANEOUS at 12:00

## 2019-01-09 RX ADMIN — PROPOFOL 200 MCG/KG/MIN: 10 INJECTION, EMULSION INTRAVENOUS at 11:41

## 2019-01-09 RX ADMIN — DEXMEDETOMIDINE HYDROCHLORIDE 8 MCG: 100 INJECTION, SOLUTION INTRAVENOUS at 12:10

## 2019-01-09 RX ADMIN — HYDROCODONE BITARTRATE AND ACETAMINOPHEN 1 TABLET: 5; 325 TABLET ORAL at 13:40

## 2019-01-09 RX ADMIN — PHENYLEPHRINE HYDROCHLORIDE 100 MCG: 10 INJECTION, SOLUTION INTRAMUSCULAR; INTRAVENOUS; SUBCUTANEOUS at 12:36

## 2019-01-09 RX ADMIN — GLYCOPYRROLATE 0.8 MG: 0.2 INJECTION, SOLUTION INTRAMUSCULAR; INTRAVENOUS at 12:20

## 2019-01-09 RX ADMIN — PHENYLEPHRINE HYDROCHLORIDE 150 MCG: 10 INJECTION, SOLUTION INTRAMUSCULAR; INTRAVENOUS; SUBCUTANEOUS at 12:06

## 2019-01-09 RX ADMIN — HYDROMORPHONE HYDROCHLORIDE 0.5 MG: 1 INJECTION, SOLUTION INTRAMUSCULAR; INTRAVENOUS; SUBCUTANEOUS at 14:03

## 2019-01-09 RX ADMIN — PHENYLEPHRINE HYDROCHLORIDE 100 MCG: 10 INJECTION, SOLUTION INTRAMUSCULAR; INTRAVENOUS; SUBCUTANEOUS at 12:41

## 2019-01-09 RX ADMIN — PROCHLORPERAZINE EDISYLATE 10 MG: 5 INJECTION INTRAMUSCULAR; INTRAVENOUS at 15:19

## 2019-01-09 RX ADMIN — HYDROMORPHONE HYDROCHLORIDE 0.5 MG: 1 INJECTION, SOLUTION INTRAMUSCULAR; INTRAVENOUS; SUBCUTANEOUS at 13:31

## 2019-01-09 RX ADMIN — ONDANSETRON 4 MG: 2 INJECTION INTRAMUSCULAR; INTRAVENOUS at 15:06

## 2019-01-09 RX ADMIN — LIDOCAINE HYDROCHLORIDE 100 MG: 20 INJECTION, SOLUTION INFILTRATION; PERINEURAL at 11:40

## 2019-01-09 RX ADMIN — SODIUM CHLORIDE, POTASSIUM CHLORIDE, SODIUM LACTATE AND CALCIUM CHLORIDE: 600; 310; 30; 20 INJECTION, SOLUTION INTRAVENOUS at 11:36

## 2019-01-09 RX ADMIN — PROPOFOL 200 MG: 10 INJECTION, EMULSION INTRAVENOUS at 11:40

## 2019-01-09 RX ADMIN — HYDROMORPHONE HYDROCHLORIDE 0.5 MG: 1 INJECTION, SOLUTION INTRAMUSCULAR; INTRAVENOUS; SUBCUTANEOUS at 13:04

## 2019-01-09 RX ADMIN — ONDANSETRON 4 MG: 2 INJECTION INTRAMUSCULAR; INTRAVENOUS at 11:55

## 2019-01-09 RX ADMIN — HYDROMORPHONE HYDROCHLORIDE 0.5 MG: 1 INJECTION, SOLUTION INTRAMUSCULAR; INTRAVENOUS; SUBCUTANEOUS at 11:52

## 2019-01-09 RX ADMIN — CEFAZOLIN SODIUM 2 G: 2 INJECTION, SOLUTION INTRAVENOUS at 11:50

## 2019-01-09 RX ADMIN — DEXAMETHASONE SODIUM PHOSPHATE 4 MG: 4 INJECTION, SOLUTION INTRA-ARTICULAR; INTRALESIONAL; INTRAMUSCULAR; INTRAVENOUS; SOFT TISSUE at 11:52

## 2019-01-09 RX ADMIN — ACETAMINOPHEN 650 MG: 325 TABLET, FILM COATED ORAL at 13:36

## 2019-01-09 RX ADMIN — NEOSTIGMINE METHYLSULFATE 5 MG: 1 INJECTION, SOLUTION INTRAVENOUS at 12:21

## 2019-01-09 RX ADMIN — HYDROMORPHONE HYDROCHLORIDE 0.5 MG: 1 INJECTION, SOLUTION INTRAMUSCULAR; INTRAVENOUS; SUBCUTANEOUS at 13:15

## 2019-01-09 RX ADMIN — HYDROMORPHONE HYDROCHLORIDE 0.5 MG: 1 INJECTION, SOLUTION INTRAMUSCULAR; INTRAVENOUS; SUBCUTANEOUS at 12:48

## 2019-01-09 RX ADMIN — KETOROLAC TROMETHAMINE 30 MG: 30 INJECTION, SOLUTION INTRAMUSCULAR at 12:23

## 2019-01-09 RX ADMIN — DIPHENHYDRAMINE HYDROCHLORIDE 25 MG: 25 CAPSULE ORAL at 14:03

## 2019-01-09 RX ADMIN — PHENYLEPHRINE HYDROCHLORIDE 200 MCG: 10 INJECTION, SOLUTION INTRAMUSCULAR; INTRAVENOUS; SUBCUTANEOUS at 12:11

## 2019-01-09 RX ADMIN — PHENYLEPHRINE HYDROCHLORIDE 200 MCG: 10 INJECTION, SOLUTION INTRAMUSCULAR; INTRAVENOUS; SUBCUTANEOUS at 11:51

## 2019-01-09 RX ADMIN — MIDAZOLAM HYDROCHLORIDE 2 MG: 1 INJECTION, SOLUTION INTRAMUSCULAR; INTRAVENOUS at 11:40

## 2019-01-09 RX ADMIN — HYDROMORPHONE HYDROCHLORIDE 0.5 MG: 1 INJECTION, SOLUTION INTRAMUSCULAR; INTRAVENOUS; SUBCUTANEOUS at 11:38

## 2019-01-09 RX ADMIN — MIDAZOLAM HYDROCHLORIDE 2 MG: 1 INJECTION, SOLUTION INTRAMUSCULAR; INTRAVENOUS at 11:36

## 2019-01-09 ASSESSMENT — ENCOUNTER SYMPTOMS: SEIZURES: 0

## 2019-01-09 ASSESSMENT — MIFFLIN-ST. JEOR: SCORE: 1691.65

## 2019-01-09 NOTE — DISCHARGE INSTRUCTIONS
[facility]  Discharge Instructions: Post-Operative Laparoscopic Cholecystectomy    ACTIVITY    Expect to feel tired after your surgery.  This will gradually resolve.      Take frequent, short walks and increase your activity gradually.      Avoid strenuous physical activity or heavy lifting greater than 15-20 lbs. for 2-3 weeks.  You may climb stairs.    You may drive without restrictions when you are not using any prescription pain medication and feel comfortable in a car.    You may return to work/school when you are comfortable without any prescription pain medication.    WOUND CARE    You may remove your outer dressing or Band-Aids and shower 48 hours after the surgery.  Pat your incisions dry and leave them open to air.  Re-apply dressing (Band-Aids or gauze/tape) as needed for comfort or drainage.    You have steri-strips (looks like white tape) on your incision.  You may peel off the steri-strips 2 weeks after your surgery if they have not peeled off on their own.     Do not soak your incisions in a tub or pool for 2 weeks.     Do not apply any lotions, creams, or ointments to your incisions.    A ridge under your incisions is normal and will gradually resolve.    DIET    Start with liquids, then gradually resume your regular diet as tolerated.  Avoid heavy, spicy, and greasy meals for 2-3 days.    Drink plenty of fluids to stay hydrated.    It is not uncommon to experience some loose stools or diarrhea after surgery.  This is your body s way of adapting to the bile which will slowly drain into your intestine.  A low fat diet may help with this.  This should improve over 1-2 months.    PAIN    Expect some tenderness and discomfort at the incision sites.  Use the prescribed pain medication at your discretion.  Expect gradual resolution of your pain over several days.    You may take ibuprofen with food (unless you have been told not to) instead of or in addition to  your prescribed pain medication.  If you are taking Norco or Percocet, do not take any additional acetaminophen/APAP/Tylenol.    Do not drink alcohol or drive while you are taking pain medications.    You may apply ice to your incisions in 20 minute intervals as needed for the next 48 hours.  After that time, consider switching to heat if you prefer.    EXPECTATIONS    Pain medications can cause constipation.  Limit use when possible.  Take over the counter stool softener/stimulant, such as Colace or Senna, 1-2 times a day with plenty of water.  You may take a mild over the counter laxative, such as Miralax or a suppository, as needed.  You may discontinue these medications once you are having regular bowel movements and/or are no longer taking your narcotic pain medication.       Your gallbladder served as a storage bag for bile. Bile is produced by your liver and helps your body breakdown food, especially fat. Some people notice loose stools / diarrhea with ingestion of fatty foods especially fried foods and cheeses. This is not harmful to your body, but can be frustrating. It is usually temporary and will resolve over 1-2 months as your body adjusts to not having a gallbladder. It can be helpful for you to avoid any trigger foods while giving this time to resolve. If it persists or is difficult to manage, please let us know and we can prescribe you a medication to help with this.     You may have shoulder or upper back discomfort due to the gas used in surgery.  This is temporary and should resolve in 48-72 hours.  Short, frequent walks may help with this.    FOLLOW UP    Our office will contact you in approximately 2 weeks to check on your progress and answer any questions you may have.  If you are doing well, you will not need to return for a follow up appointment.  If any concerns are identified over the phone, we will help you make an appointment to see a provider.     If you have not received a phone call,  have any questions or concerns, or would like to be seen, please call us at [phone] and ask to speak with our nurse.  We are located at [address] [place] [address] [address]  [place] [address].    CALL OUR OFFICE [phone] IF YOU HAVE:     Chills or fever above 101 F.    Increased redness, warmth, or drainage at your incisions.    Significant bleeding.    Pain not relieved by your pain medication or rest.    Increasing pain after the first 48 hours.    Any other concerns or questions.    Revised July 2018     Same Day Surgery Discharge Instructions for  Sedation and General Anesthesia       It's not unusual to feel dizzy, light-headed or faint for up to 24 hours after surgery or while taking pain medication.  If you have these symptoms: sit for a few minutes before standing and have someone assist you when you get up to walk or use the bathroom.      You should rest and relax for the next 24 hours. We recommend you make arrangements to have an adult stay with you for at least 24 hours after your discharge.  Avoid hazardous and strenuous activity.      DO NOT DRIVE any vehicle or operate mechanical equipment for 24 hours following the end of your surgery.  Even though you may feel normal, your reactions may be affected by the medication you have received.      Do not drink alcoholic beverages for 24 hours following surgery.       Slowly progress to your regular diet as you feel able. It's not unusual to feel nauseated and/or vomit after receiving anesthesia.  If you develop these symptoms, drink clear liquids (apple juice, ginger ale, broth, 7-up, etc. ) until you feel better.  If your nausea and vomiting persists for 24 hours, please notify your surgeon.        All narcotic pain medications, along with inactivity and anesthesia, can cause constipation. Drinking plenty of liquids and increasing fiber intake will help.      For any questions of a medical nature, call your surgeon.      Do not make important  decisions for 24 hours.      If you had general anesthesia, you may have a sore throat for a couple of days related to the breathing tube used during surgery.  You may use Cepacol lozenges to help with this discomfort.  If it worsens or if you develop a fever, contact your surgeon.       If you feel your pain is not well managed with the pain medications prescribed by your surgeon, please contact your surgeon's office to let them know so they can address your concerns.       While you were at the hospital today you received Toradol, an antiinflammatory medication similar to Ibuprofen.  You should not take other antiinflammatory medication, such as Ibuprofen, Motrin, Advil, Aleve, Naprosyn, etc, until 6:23 PM on 1/9/19.       While you were at the hospital today,  you were given 975 mg of Tylenol at 1:30 PM on /1/2019. The recommended 24 hour dose is 4000 mg.      **If you have questions or concerns about your procedure,  call Dr. Butler at [phone]**

## 2019-01-09 NOTE — ANESTHESIA POSTPROCEDURE EVALUATION
Patient: Zelda Paulson    Procedure(s):  LAPAROSCOPIC CHOLECYSTECTOMY WITH POSSIBLE INTRAOPERATIVE CHOLANGIOGRAMS    Diagnosis:GALL STONES  Diagnosis Additional Information: No value filed.    Anesthesia Type:  General, ETT    Note:  Anesthesia Post Evaluation    Patient location during evaluation: PACU  Patient participation: Able to fully participate in evaluation  Level of consciousness: awake  Pain management: adequate  Airway patency: patent  Cardiovascular status: acceptable  Respiratory status: acceptable  Hydration status: acceptable  PONV: none     Anesthetic complications: None          Last vitals:  Vitals:    01/09/19 1345 01/09/19 1400 01/09/19 1415   BP: 108/72 111/67    Pulse: 67 76    Resp: 13 13 19   Temp:  36.6  C (97.8  F)    SpO2: 95% 95% 92%         Electronically Signed By: Dewey Lebron MD  January 9, 2019  4:02 PM

## 2019-01-09 NOTE — OR NURSING
Pt feeling nauseated, zofran given with no relief. Compazine given, pt resting will monitor and discharge when patient feels better.

## 2019-01-09 NOTE — PROGRESS NOTES
Nausea alleviated with compazine, pt discharge home with mother. Discharge instructions and rx reviewed w/mother, verbal understanding given to RN.

## 2019-01-09 NOTE — OP NOTE
General Surgery Operative Note    PREOPERATIVE DIAGNOSIS:  GALL STONES, dilated common bile duct    POSTOPERATIVE DIAGNOSIS: Same    PROCEDURE:  LAPAROSCOPIC CHOLECYSTECTOMY WITH INTRAOPERATIVE CHOLANGIOGRAMS    ANESTHESIA:  General.    PREOPERATIVE MEDICATIONS:  Ancef IV.    SURGEON:  Valentín Butler MD    ASSISTANT:  Sabi Taveras PA-C, Physician assistant first assistant was necessary during the performance of this procedure for expertise in patient positioning, prepping, draping, trocar placement, camera management, retraction and exposure, and suctioning.    INDICATIONS: Patient presented with signs and symptoms consistent with biliary colic and symptomatic gallstones.  There is also noted on imaging to have a mildly dilated common bile duct..  Extensive discussion was undertaken regarding the procedure of cholecystectomy.  The potential risks of bleeding, infection, bile duct or visceral injury were thoroughly reviewed.  All of the patient's questions were answered.  The patient wishes to proceed with cholecystectomy.    PROCEDURE:  After informed consent was obtained the patient was taken to the operating suite and uneventfully endotracheally intubated.  The abdomen was prepped and draped in a sterile fashion.  Surgeon initiated timeout was acknowledged.  Stab incision was then made in the right upper portion within the umbilicus.  Through this a 5 mm optical trocar was used to gain access to the abdominal cavity. A CO2 pneumoperitoneum was then created.  Two other trocars were placed under laparoscopic visualization following the infiltration of local anesthetic with an 11 mm port in the subxiphoid position and a 5 mm port in the right upper quadrant..  We elevated the liver and were able to identify the gallbladder.  The gallbladder was grasped and used to elevate the liver further.  I began dissecting out some fatty adhesions down near the neck of the gallbladder until a cystic duct was encountered.  I  continued the dissection using combination of sharp and blunt dissection until the cystic duct was largely dissected out.  I continued the dissection up along the sides of the gallbladder, both medially and laterally, until I had created a space between the gallbladder and the liver.  At this point, I encountered the cystic artery, just posterior and lateral to the cystic duct.  This again was dissected out.  Once I had created a window where only the cystic artery and duct were noted to be entering the gallbladder, I felt that this represented our critical view.  The cystic artery was then ligated with clips but not yet divided.  A solitary clip was placed distal on the cystic duct.  Sharp scissors were then used to create a small opening within the cystic duct.  A taut catheter with Winkler cholangiogram clamp were then introduced.  The cystic duct was then easily intubated.  Intraoperative cholangiography was then performed under fluoroscopy.  Images were personally interpreted.  Anatomy was confirmed.  Fluoroscopic images revealed no evidence of obstruction of the common bile duct.  There was rapid flow of contrast into the duodenum.  The cholangiogram clamp and taut catheter was then removed.  The proximal cystic duct was then divided with clips.  Duct and artery were then divided with scissors.   I continued the dissection up along the body of the gallbladder, freeing all attachments and adhesions of the gallbladder to the liver.  Gallbladder was removed from the liver in an atraumatic fashion.  The gallbladder was then placed in a retrieval pouch and removed from the abdomen.  The gallbladder fossa was reinspected, and all areas of bleeding were managed with electrocautery.  I irrigated the area with normal saline and aspirated it out.  I then reinspected the abdomen, and everything appeared to be in pristine condition. The trocars were removed and carbon dioxide was massaged from the abdomen. Local anesthetic  was injected. Skin incisions were closed with subcuticular 4-0 Monocryl sutures.  The patient tolerated this procedure without difficulty. Needle and sponge counts were correct. The patient was taken from the operating room To the recovery room in a stable condition.      ESTIMATED BLOOD LOSS:  10cc    Valentín Butler MD

## 2019-01-09 NOTE — ANESTHESIA PREPROCEDURE EVALUATION
Anesthesia Pre-Procedure Evaluation    Patient: Zedla Paulson   MRN: [id number] : 1995          Preoperative Diagnosis: GALL STONES    Procedure(s):  LAPAROSCOPIC CHOLECYSTECTOMY WITH POSSIBLE INTRAOPERATIVE CHOLANGIOGRAMS    Past Medical History:   Diagnosis Date     Chronic kidney disease     kidney stones     Uncomplicated asthma      Past Surgical History:   Procedure Laterality Date     APPENDECTOMY        SECTION N/A 2018    Procedure:  SECTION;;  Surgeon: Lela Looney MD;  Location: [facility]     ENT SURGERY      tonsillectomy       Anesthesia Evaluation     . Pt has had prior anesthetic.            ROS/MED HX    ENT/Pulmonary:     (+)KRIS risk factors obese, asthma Treatment: Inhaler prn,  , . .    Neurologic:  - neg neurologic ROS    (-) seizures, CVA and migraines   Cardiovascular:  - neg cardiovascular ROS       METS/Exercise Tolerance:     Hematologic:  - neg hematologic  ROS       Musculoskeletal:  - neg musculoskeletal ROS       GI/Hepatic: Comment: Increased LFT's    (+) cholecystitis/cholelithiasis,      (-) GERD   Renal/Genitourinary:     (+) chronic renal disease, Nephrolithiasis ,       Endo:     (+) thyroid problem hypothyroidism, Obesity, .      Psychiatric:  - neg psychiatric ROS       Infectious Disease:         Malignancy:         Other: Comment: Polycystic ovarian syndrome                         Physical Exam  Normal systems: cardiovascular, pulmonary and dental    Airway   Mallampati: III  TM distance: >3 FB  Neck ROM: full    Dental     Cardiovascular   Rhythm and rate: regular and normal      Pulmonary    breath sounds clear to auscultation            Lab Results   Component Value Date    WBC 7.2 2019    HGB 11.7 2019    HCT 37.4 2019     2019     2019    POTASSIUM 3.5 2019    CHLORIDE 107 2019    CO2 27 2019    BUN 12 2019    CR 0.83 2019    GLC 75 2019    SHONNA  "8.4 (L) 01/06/2019    ALBUMIN 3.6 01/06/2019    PROTTOTAL 7.7 01/06/2019     (H) 01/06/2019     (H) 01/06/2019    ALKPHOS 146 01/06/2019    BILITOTAL 0.3 01/06/2019    LIPASE 152 01/06/2019    HCG Negative 01/09/2019       Preop Vitals  BP Readings from Last 3 Encounters:   01/09/19 129/70   01/06/19 125/64   04/30/18 121/72    Pulse Readings from Last 3 Encounters:   04/30/18 69   08/09/17 72   06/27/17 74      Resp Readings from Last 3 Encounters:   01/09/19 16   01/06/19 20   04/30/18 16    SpO2 Readings from Last 3 Encounters:   01/09/19 97%   01/06/19 99%   04/28/18 98%      Temp Readings from Last 1 Encounters:   01/09/19 38.2  C (100.7  F) (Oral)    Ht Readings from Last 1 Encounters:   01/09/19 1.549 m (5' 1\")      Wt Readings from Last 1 Encounters:   01/09/19 99.9 kg (220 lb 4.8 oz)    Estimated body mass index is 41.63 kg/m  as calculated from the following:    Height as of this encounter: 1.549 m (5' 1\").    Weight as of this encounter: 99.9 kg (220 lb 4.8 oz).       Anesthesia Plan      History & Physical Review  History and physical reviewed and following examination; no interval change.    ASA Status:  2 .    NPO Status:  > 8 hours    Plan for General and ETT with Propofol induction. Maintenance will be TIVA.    PONV prophylaxis:  Ondansetron (or other 5HT-3) and Dexamethasone or Solumedrol  Additional equipment: Videolaryngoscope Dilaudid is ok       Postoperative Care  Postoperative pain management:  IV analgesics.      Consents  Anesthetic plan, risks, benefits and alternatives discussed with:  Patient..                 Dewey Lebron MD  "

## 2019-01-09 NOTE — ANESTHESIA CARE TRANSFER NOTE
Patient: Zelda Paulson    Procedure(s):  LAPAROSCOPIC CHOLECYSTECTOMY WITH POSSIBLE INTRAOPERATIVE CHOLANGIOGRAMS    Diagnosis: GALL STONES  Diagnosis Additional Information: No value filed.    Anesthesia Type:   General, ETT     Note:  Airway :Face Mask  Patient transferred to:PACU  Comments: Vitals stable. Exchanging well.      Vitals: (Last set prior to Anesthesia Care Transfer)    CRNA VITALS  1/9/2019 1213 - 1/9/2019 1248      1/9/2019             Resp Rate (set):  10                Electronically Signed By: QUINTIN Hernandez CRNA  January 9, 2019  12:48 PM

## 2019-01-10 LAB — COPATH REPORT: NORMAL

## 2019-01-10 NOTE — PROGRESS NOTES
Patient seen in the preoperative area.  Familiar with this patient from previously working with her.  Has had off and on right upper quadrant pain following pregnancy.  Symptoms became more severe and necessitated a visit to the emergency department recently.  She was evaluated including abdominal ultrasound.  Was found to have cholelithiasis with mild dilation of her common bile duct.  Her records were thoroughly reviewed.  Believe she has signs and symptoms consistent with biliary colic and symptomatic gallstones.  We discussed at length the procedure of cholecystectomy with intraoperative cholangiogram.  We will proceed with surgery.  Risks, benefits and alternatives were discussed.  Her questions were all answered to her satisfaction.  Total time spent was 15 minutes with greater than 50% in face-to-face consultation.

## 2019-01-18 ENCOUNTER — TELEPHONE (OUTPATIENT)
Dept: SURGERY | Facility: CLINIC | Age: 24
End: 2019-01-18

## 2019-01-18 NOTE — TELEPHONE ENCOUNTER
Routine postop discussion today with patient. She is doing well, only needed her narcotics for 3 days and is currently pain-free. Plans to return to work on Monday. Bowels and bladder functioning without any concerns. Incisions healing well without sign of infection. She has removed the steri strips. Surgical pathology was not discussed with the patient, but was reviewed by myself and is below. Pt understands to call with any future questions or concerns. Return to work letter provided and faxed to employer.      Surgical pathology:    SPECIMEN(S):   Gallbladder and contents     FINAL DIAGNOSIS:   Gallbladder, resection   - Chronic cholecystitis   - Cholesterolosis   - Cholelithiasis   - Benign lymph node         Sabi Taveras PA-C  [facility]  [phone]

## 2020-03-10 ENCOUNTER — HEALTH MAINTENANCE LETTER (OUTPATIENT)
Age: 25
End: 2020-03-10

## 2020-06-29 ENCOUNTER — APPOINTMENT (OUTPATIENT)
Dept: LAB | Facility: CLINIC | Age: 25
End: 2020-06-29

## 2020-12-27 ENCOUNTER — HEALTH MAINTENANCE LETTER (OUTPATIENT)
Age: 25
End: 2020-12-27

## 2021-01-11 LAB
ABO + RH BLD: NORMAL
ABO + RH BLD: NORMAL
BLD GP AB SCN SERPL QL: NEGATIVE
HBV SURFACE AG SERPL QL IA: NON REACTIVE
HIV 1+2 AB+HIV1 P24 AG SERPL QL IA: NON REACTIVE
RUBELLA ABY IGG: NORMAL
TREPONEMA ANTIBODIES: NON REACTIVE

## 2021-02-17 ENCOUNTER — RECORDS - HEALTHEAST (OUTPATIENT)
Dept: LAB | Facility: HOSPITAL | Age: 26
End: 2021-02-17

## 2021-02-17 LAB
HBV SURFACE AG SERPL QL IA: NEGATIVE
HCV AB SERPL QL IA: NEGATIVE
HIV 1+2 AB+HIV1 P24 AG SERPL QL IA: NEGATIVE

## 2021-04-10 ENCOUNTER — HOSPITAL ENCOUNTER (OUTPATIENT)
Facility: CLINIC | Age: 26
Discharge: HOME | End: 2021-04-10
Attending: OBSTETRICS & GYNECOLOGY | Admitting: OBSTETRICS & GYNECOLOGY
Payer: COMMERCIAL

## 2021-04-10 ENCOUNTER — HOSPITAL ENCOUNTER (OUTPATIENT)
Facility: CLINIC | Age: 26
End: 2021-04-10
Admitting: OBSTETRICS & GYNECOLOGY
Payer: COMMERCIAL

## 2021-04-10 ENCOUNTER — APPOINTMENT (OUTPATIENT)
Dept: ULTRASOUND IMAGING | Facility: CLINIC | Age: 26
End: 2021-04-10
Attending: OBSTETRICS & GYNECOLOGY
Payer: COMMERCIAL

## 2021-04-10 VITALS
RESPIRATION RATE: 16 BRPM | HEART RATE: 90 BPM | DIASTOLIC BLOOD PRESSURE: 71 MMHG | TEMPERATURE: 97.5 F | SYSTOLIC BLOOD PRESSURE: 112 MMHG

## 2021-04-10 LAB
ALBUMIN UR-MCNC: 10 MG/DL
AMORPH CRY #/AREA URNS HPF: ABNORMAL /HPF
APPEARANCE UR: ABNORMAL
BILIRUB UR QL STRIP: NEGATIVE
COLOR UR AUTO: ABNORMAL
ERYTHROCYTE [DISTWIDTH] IN BLOOD BY AUTOMATED COUNT: 12.7 % (ref 10–15)
FIBRINOGEN PPP-MCNC: 459 MG/DL (ref 200–420)
GLUCOSE UR STRIP-MCNC: NEGATIVE MG/DL
HCT VFR BLD AUTO: 31.6 % (ref 35–47)
HGB BLD-MCNC: 10 G/DL (ref 11.7–15.7)
HGB UR QL STRIP: ABNORMAL
INR PPP: 0.98 (ref 0.86–1.14)
KETONES UR STRIP-MCNC: NEGATIVE MG/DL
LEUKOCYTE ESTERASE UR QL STRIP: NEGATIVE
MCH RBC QN AUTO: 27.9 PG (ref 26.5–33)
MCHC RBC AUTO-ENTMCNC: 31.6 G/DL (ref 31.5–36.5)
MCV RBC AUTO: 88 FL (ref 78–100)
MUCOUS THREADS #/AREA URNS LPF: PRESENT /LPF
NITRATE UR QL: NEGATIVE
PH UR STRIP: 7.5 PH (ref 5–7)
PLATELET # BLD AUTO: 213 10E9/L (ref 150–450)
RBC # BLD AUTO: 3.58 10E12/L (ref 3.8–5.2)
RBC #/AREA URNS AUTO: 0 /HPF (ref 0–2)
SOURCE: ABNORMAL
SP GR UR STRIP: 1.02 (ref 1–1.03)
SPECIMEN SOURCE: NORMAL
SQUAMOUS #/AREA URNS AUTO: 1 /HPF (ref 0–1)
UROBILINOGEN UR STRIP-MCNC: 0 MG/DL (ref 0–2)
WBC # BLD AUTO: 8.1 10E9/L (ref 4–11)
WBC #/AREA URNS AUTO: 1 /HPF (ref 0–5)
WET PREP SPEC: NORMAL

## 2021-04-10 PROCEDURE — 76819 FETAL BIOPHYS PROFIL W/O NST: CPT

## 2021-04-10 PROCEDURE — 36415 COLL VENOUS BLD VENIPUNCTURE: CPT | Performed by: OBSTETRICS & GYNECOLOGY

## 2021-04-10 PROCEDURE — 81001 URINALYSIS AUTO W/SCOPE: CPT | Performed by: OBSTETRICS & GYNECOLOGY

## 2021-04-10 PROCEDURE — 85610 PROTHROMBIN TIME: CPT | Performed by: OBSTETRICS & GYNECOLOGY

## 2021-04-10 PROCEDURE — 85027 COMPLETE CBC AUTOMATED: CPT | Performed by: OBSTETRICS & GYNECOLOGY

## 2021-04-10 PROCEDURE — 87210 SMEAR WET MOUNT SALINE/INK: CPT | Performed by: OBSTETRICS & GYNECOLOGY

## 2021-04-10 PROCEDURE — 85460 HEMOGLOBIN FETAL: CPT | Performed by: OBSTETRICS & GYNECOLOGY

## 2021-04-10 PROCEDURE — 76805 OB US >/= 14 WKS SNGL FETUS: CPT

## 2021-04-10 PROCEDURE — G0463 HOSPITAL OUTPT CLINIC VISIT: HCPCS

## 2021-04-10 PROCEDURE — 85384 FIBRINOGEN ACTIVITY: CPT | Performed by: OBSTETRICS & GYNECOLOGY

## 2021-04-10 RX ORDER — ONDANSETRON 2 MG/ML
4 INJECTION INTRAMUSCULAR; INTRAVENOUS EVERY 6 HOURS PRN
Status: DISCONTINUED | OUTPATIENT
Start: 2021-04-10 | End: 2021-04-11 | Stop reason: HOSPADM

## 2021-04-10 NOTE — PLAN OF CARE
Zelda comes to the MAC with c/o noticing a small amount of blood after using the bathroom around 2:45pm and then noticed it 4-5 more times after that. She is currently 26 weeks. She denies recent intercourse and no hard BM's. A call was placed to Dr Griffin. Will get a UA.  1823- UA sent.

## 2021-04-11 LAB — HGB F BLD QL KLEIH BETKE: NORMAL

## 2021-04-11 NOTE — PLAN OF CARE
2145- Dr. Griffin updated on EFM monitoring >80% of the time while held for approx 45 minutes- FHT's 145 with moderate variability, no decels noted.  Updated on labwork.  Orders ultrasound.  Discussed with patient plan for ultrasound, reassured with FHT's and no uterine activity.  Pt agrees with US plan and is reassured.  Will call Dr. Griffin with US results once it is completed.  Off EFM.

## 2021-04-11 NOTE — DISCHARGE INSTRUCTIONS
Discharge Instruction for Undelivered Patients      You were seen for: Bleeding Assessment  We Consulted: Dr. Griffin  You had (Test or Medicine):External fetal and uterine monitoring, speculum exam, ultrasound, urinalysis    Diet:   Drink 8 to 12 glasses of liquids (milk, juice, water) every day.  You may eat meals and snacks.     Activity:  Call your doctor or nurse midwife if your baby is moving less than usual.     Call your provider if you notice:  Swelling in your face or increased swelling in your hands or legs.  Headaches that are not relieved by Tylenol (acetaminophen).  Changes in your vision (blurring: seeing spots or stars.)  Nausea (sick to your stomach) and vomiting (throwing up).   Weight gain of 5 pounds or more per week.  Heartburn that doesn't go away.  Signs of bladder infection: pain when you urinate (use the toilet), need to go more often and more urgently.  The bag of gentile (rupture of membranes) breaks, or you notice leaking in your underwear.  Bright red blood in your underwear.  Abdominal (lower belly) or stomach pain.  For first baby: Contractions (tightening) less than 5 minutes apart for one hour or more.  Second (plus) baby: Contractions (tightening) less than 10 minutes apart and getting stronger.  *If less than 34 weeks: Contractions (tightenings) more than 6 times in one hour.  Increase or change in vaginal discharge (note the color and amount)  Other: Call for worsening bleeding    Follow-up:  Make an appointment to be seen on this week at clinic

## 2021-04-11 NOTE — PLAN OF CARE
4633- Dr. Griffin updated on US report.  Discharge orders received via phone.  Discussed orders with patient- verbalized understanding, denies any questions.  Discharge to home.

## 2021-04-11 NOTE — PROVIDER NOTIFICATION
04/10/21 1950   Provider Notification   Provider Name/Title Dr Griffin   Method of Notification At Bedside   Request Evaluate in Person   Notification Reason Other (Comment)       At bedside for assessment. To perform speculum exam, wet prep, SVE.

## 2021-04-11 NOTE — PROGRESS NOTES
at 26+ wks here with persistent spotting. She had brown spotting after wiping this afternoon and called. The spotting continued when she wiped so she called her primary MD DR Rankin and presented to [facility] for evaluation.    Prenatal course uncomplicated per pt. Hx of prev c/s for nonreassuring FHT at term.     PMH hypothyroid, obesity    ROS-no bladder sx, no constipation, no recent IC. No cold/flu sx.   Fundus NT  No contractions  Prev only doptone FHT  SSE- dark brown spotting in vault  Wet prep done, cx cl/long/hi  extr neg    UA neg except blood and mucus-presumed from perineum    A/P IUP 26 wks with brown spotting  No evidence of PTL or UTI  No sx of cx polyp, will send wet prep to r/o BV. If negative then may need to proceed with US  Check labs,

## 2021-04-24 ENCOUNTER — HEALTH MAINTENANCE LETTER (OUTPATIENT)
Age: 26
End: 2021-04-24

## 2021-05-07 ENCOUNTER — HOSPITAL ENCOUNTER (OUTPATIENT)
Facility: CLINIC | Age: 26
Discharge: HOME | End: 2021-05-07
Attending: OBSTETRICS & GYNECOLOGY | Admitting: OBSTETRICS & GYNECOLOGY
Payer: COMMERCIAL

## 2021-05-07 ENCOUNTER — HOSPITAL ENCOUNTER (OUTPATIENT)
Facility: CLINIC | Age: 26
End: 2021-05-07
Admitting: OBSTETRICS & GYNECOLOGY
Payer: COMMERCIAL

## 2021-05-07 VITALS
SYSTOLIC BLOOD PRESSURE: 123 MMHG | RESPIRATION RATE: 14 BRPM | HEIGHT: 61 IN | BODY MASS INDEX: 45.31 KG/M2 | WEIGHT: 240 LBS | TEMPERATURE: 97.5 F | DIASTOLIC BLOOD PRESSURE: 64 MMHG

## 2021-05-07 LAB
ALBUMIN UR-MCNC: 30 MG/DL
APPEARANCE UR: CLEAR
BACTERIA #/AREA URNS HPF: ABNORMAL /HPF
BILIRUB UR QL STRIP: NEGATIVE
CAOX CRY #/AREA URNS HPF: ABNORMAL /HPF
COLOR UR AUTO: YELLOW
GLUCOSE UR STRIP-MCNC: NEGATIVE MG/DL
HGB UR QL STRIP: NEGATIVE
KETONES UR STRIP-MCNC: NEGATIVE MG/DL
LEUKOCYTE ESTERASE UR QL STRIP: NEGATIVE
MUCOUS THREADS #/AREA URNS LPF: PRESENT /LPF
NITRATE UR QL: NEGATIVE
PH UR STRIP: 6.5 PH (ref 5–7)
RBC #/AREA URNS AUTO: 0 /HPF (ref 0–2)
SOURCE: ABNORMAL
SP GR UR STRIP: 1.03 (ref 1–1.03)
SQUAMOUS #/AREA URNS AUTO: 3 /HPF (ref 0–1)
UROBILINOGEN UR STRIP-MCNC: 0 MG/DL (ref 0–2)
WBC #/AREA URNS AUTO: 3 /HPF (ref 0–5)

## 2021-05-07 PROCEDURE — 59025 FETAL NON-STRESS TEST: CPT

## 2021-05-07 PROCEDURE — G0463 HOSPITAL OUTPT CLINIC VISIT: HCPCS | Mod: 25

## 2021-05-07 PROCEDURE — 81001 URINALYSIS AUTO W/SCOPE: CPT | Performed by: OBSTETRICS & GYNECOLOGY

## 2021-05-07 RX ORDER — ONDANSETRON 2 MG/ML
4 INJECTION INTRAMUSCULAR; INTRAVENOUS EVERY 6 HOURS PRN
Status: DISCONTINUED | OUTPATIENT
Start: 2021-05-07 | End: 2021-05-07 | Stop reason: HOSPADM

## 2021-05-07 RX ORDER — PRENATAL VIT/IRON FUM/FOLIC AC 27MG-0.8MG
1 TABLET ORAL DAILY
COMMUNITY
End: 2021-09-03

## 2021-05-07 ASSESSMENT — MIFFLIN-ST. JEOR: SCORE: 1766.01

## 2021-05-08 NOTE — DISCHARGE INSTRUCTIONS
Discharge Instruction for Undelivered Patients      You were seen for: Fetal Assessment  We Consulted: Dr Cueva  You had (Test or Medicine):fetal monitoring (non-stress test), urinalysis, cervix check     Diet:   Drink 8 to 12 glasses of liquids (milk, juice, water) every day.  You may eat meals and snacks.     Activity:  Call your doctor or nurse midwife if your baby is moving less than usual.     Call your provider if you notice:  Swelling in your face or increased swelling in your hands or legs.  Headaches that are not relieved by Tylenol (acetaminophen).  Changes in your vision (blurring: seeing spots or stars.)  Nausea (sick to your stomach) and vomiting (throwing up).   Weight gain of 5 pounds or more per week.  Heartburn that doesn't go away.  Signs of bladder infection: pain when you urinate (use the toilet), need to go more often and more urgently.  The bag of gentile (rupture of membranes) breaks, or you notice leaking in your underwear.  Bright red blood in your underwear.  Abdominal (lower belly) or stomach pain.  Second (plus) baby: Contractions (tightening) less than 10 minutes apart and getting stronger.  *If less than 34 weeks: Contractions (tightenings) more than 6 times in one hour.  Increase or change in vaginal discharge (note the color and amount)    Follow-up:  As scheduled in the clinic  Appointment scheduled Monday per patient

## 2021-05-08 NOTE — PLAN OF CARE
Multip in MAC, here with her mother, for c/o lower mid back ache and vaginal burning intermittently since micnight, better this morning, then back again this afternoon.  Pt resting quietly in bed.  Pt wondering if she has UTI, but had used bathroom prior to arrival.  Pt with history of kidney stones, but denies flank location pain.  Discussed POC.  Pt wishes to proceed.  Monitors applied and history obtained.  Will give water pitcher and pt to call when ready to leave urine sample.

## 2021-05-08 NOTE — PLAN OF CARE
Report received from Indu MEDRANO RN.  Assuming care at this time.     Patient up to bathroom.  Clean catch urine collected by patient and sent to lab.  External monitors reapplied after BRP.          Dr Cueva pagemarty with return call received.  Update included but not limited to: Patients arrival, patient presents to maternal assessment center complaint of center low back pain with vaginal burning after urination.  She is a .  30 2/7 gestation.  Medical history significant for kidney stones.  Reactive fetal tracing.  No contractions noted on monitor or palpated as monitor adjusted.  UA collected and sent: abnormal results read as follows:  Protein 30, Bacteria:Few, Squameous Epithelial:3, Mucous:Present, Calcium Oxalate:Many.  Plan per provider is to check cervix, if closed may discharge to home with routine discharge instructions and follow-up.      Cervical exam:  Closed/thick/posterior      Patient given discharge instructions verbalizes understanding and patient signed after visit summary signature page.  Patient and her mother in agreement with plan.  Patient instructed to report change in fetal movement, vaginal leaking of fluid or bleeding, abdominal pain, or any concerns related to the pregnancy to her physician.  Monitors removed for patient to dress.  Patient states her next appointment wit primary provider - Dr Rankin is on Monday.       Discharged home, undelivered, ambulatory at .

## 2021-06-14 DIAGNOSIS — Z11.59 ENCOUNTER FOR SCREENING FOR OTHER VIRAL DISEASES: ICD-10-CM

## 2021-06-15 LAB — GROUP B STREP PCR: NEGATIVE

## 2021-07-05 DIAGNOSIS — Z11.59 ENCOUNTER FOR SCREENING FOR OTHER VIRAL DISEASES: ICD-10-CM

## 2021-07-05 LAB
LABORATORY COMMENT REPORT: NORMAL
SARS-COV-2 RNA RESP QL NAA+PROBE: NEGATIVE
SARS-COV-2 RNA RESP QL NAA+PROBE: NORMAL
SPECIMEN SOURCE: NORMAL
SPECIMEN SOURCE: NORMAL

## 2021-07-05 PROCEDURE — U0003 INFECTIOUS AGENT DETECTION BY NUCLEIC ACID (DNA OR RNA); SEVERE ACUTE RESPIRATORY SYNDROME CORONAVIRUS 2 (SARS-COV-2) (CORONAVIRUS DISEASE [COVID-19]), AMPLIFIED PROBE TECHNIQUE, MAKING USE OF HIGH THROUGHPUT TECHNOLOGIES AS DESCRIBED BY CMS-2020-01-R: HCPCS | Performed by: OBSTETRICS & GYNECOLOGY

## 2021-07-05 PROCEDURE — U0005 INFEC AGEN DETEC AMPLI PROBE: HCPCS | Performed by: OBSTETRICS & GYNECOLOGY

## 2021-07-07 ENCOUNTER — ANESTHESIA (OUTPATIENT)
Dept: OBGYN | Facility: CLINIC | Age: 26
End: 2021-07-07
Payer: COMMERCIAL

## 2021-07-07 ENCOUNTER — HOSPITAL ENCOUNTER (INPATIENT)
Facility: CLINIC | Age: 26
LOS: 2 days | Discharge: HOME | End: 2021-07-09
Attending: OBSTETRICS & GYNECOLOGY | Admitting: OBSTETRICS & GYNECOLOGY
Payer: COMMERCIAL

## 2021-07-07 ENCOUNTER — ANESTHESIA EVENT (OUTPATIENT)
Dept: OBGYN | Facility: CLINIC | Age: 26
End: 2021-07-07
Payer: COMMERCIAL

## 2021-07-07 DIAGNOSIS — Z98.891 S/P REPEAT LOW TRANSVERSE C-SECTION: Primary | ICD-10-CM

## 2021-07-07 LAB
ABO + RH BLD: NORMAL
ABO + RH BLD: NORMAL
BLD GP AB SCN SERPL QL: NORMAL
BLOOD BANK CMNT PATIENT-IMP: NORMAL
HGB BLD-MCNC: 10.2 G/DL (ref 11.7–15.7)
SPECIMEN EXP DATE BLD: NORMAL
T PALLIDUM AB SER QL: NONREACTIVE

## 2021-07-07 PROCEDURE — 250N000009 HC RX 250: Performed by: NURSE ANESTHETIST, CERTIFIED REGISTERED

## 2021-07-07 PROCEDURE — 360N000076 HC SURGERY LEVEL 3, PER MIN: Performed by: OBSTETRICS & GYNECOLOGY

## 2021-07-07 PROCEDURE — 258N000003 HC RX IP 258 OP 636: Performed by: NURSE ANESTHETIST, CERTIFIED REGISTERED

## 2021-07-07 PROCEDURE — 250N000013 HC RX MED GY IP 250 OP 250 PS 637: Performed by: OBSTETRICS & GYNECOLOGY

## 2021-07-07 PROCEDURE — 86901 BLOOD TYPING SEROLOGIC RH(D): CPT | Performed by: OBSTETRICS & GYNECOLOGY

## 2021-07-07 PROCEDURE — 86850 RBC ANTIBODY SCREEN: CPT | Performed by: OBSTETRICS & GYNECOLOGY

## 2021-07-07 PROCEDURE — 86780 TREPONEMA PALLIDUM: CPT | Performed by: OBSTETRICS & GYNECOLOGY

## 2021-07-07 PROCEDURE — 250N000013 HC RX MED GY IP 250 OP 250 PS 637

## 2021-07-07 PROCEDURE — 370N000017 HC ANESTHESIA TECHNICAL FEE, PER MIN: Performed by: OBSTETRICS & GYNECOLOGY

## 2021-07-07 PROCEDURE — 86900 BLOOD TYPING SEROLOGIC ABO: CPT | Performed by: OBSTETRICS & GYNECOLOGY

## 2021-07-07 PROCEDURE — 250N000011 HC RX IP 250 OP 636: Performed by: PHYSICIAN ASSISTANT

## 2021-07-07 PROCEDURE — 710N000010 HC RECOVERY PHASE 1, LEVEL 2, PER MIN: Performed by: OBSTETRICS & GYNECOLOGY

## 2021-07-07 PROCEDURE — 250N000011 HC RX IP 250 OP 636: Performed by: OBSTETRICS & GYNECOLOGY

## 2021-07-07 PROCEDURE — 258N000003 HC RX IP 258 OP 636: Performed by: PHYSICIAN ASSISTANT

## 2021-07-07 PROCEDURE — 0DNW0ZZ RELEASE PERITONEUM, OPEN APPROACH: ICD-10-PCS | Performed by: OBSTETRICS & GYNECOLOGY

## 2021-07-07 PROCEDURE — 250N000011 HC RX IP 250 OP 636: Performed by: NURSE ANESTHETIST, CERTIFIED REGISTERED

## 2021-07-07 PROCEDURE — 250N000011 HC RX IP 250 OP 636: Performed by: ANESTHESIOLOGY

## 2021-07-07 PROCEDURE — 85018 HEMOGLOBIN: CPT | Performed by: OBSTETRICS & GYNECOLOGY

## 2021-07-07 PROCEDURE — 250N000009 HC RX 250: Performed by: OBSTETRICS & GYNECOLOGY

## 2021-07-07 PROCEDURE — 250N000011 HC RX IP 250 OP 636

## 2021-07-07 PROCEDURE — 272N000001 HC OR GENERAL SUPPLY STERILE: Performed by: OBSTETRICS & GYNECOLOGY

## 2021-07-07 PROCEDURE — 120N000012 HC R&B POSTPARTUM

## 2021-07-07 RX ORDER — NALOXONE HYDROCHLORIDE 0.4 MG/ML
0.2 INJECTION, SOLUTION INTRAMUSCULAR; INTRAVENOUS; SUBCUTANEOUS
Status: DISCONTINUED | OUTPATIENT
Start: 2021-07-07 | End: 2021-07-09 | Stop reason: HOSPADM

## 2021-07-07 RX ORDER — OXYTOCIN/0.9 % SODIUM CHLORIDE 30/500 ML
PLASTIC BAG, INJECTION (ML) INTRAVENOUS CONTINUOUS PRN
Status: DISCONTINUED | OUTPATIENT
Start: 2021-07-07 | End: 2021-07-07

## 2021-07-07 RX ORDER — ACETAMINOPHEN 325 MG/1
TABLET ORAL
Status: COMPLETED
Start: 2021-07-07 | End: 2021-07-07

## 2021-07-07 RX ORDER — OXYCODONE HYDROCHLORIDE 5 MG/1
5 TABLET ORAL EVERY 4 HOURS PRN
Status: DISCONTINUED | OUTPATIENT
Start: 2021-07-07 | End: 2021-07-09 | Stop reason: HOSPADM

## 2021-07-07 RX ORDER — ONDANSETRON 2 MG/ML
INJECTION INTRAMUSCULAR; INTRAVENOUS PRN
Status: DISCONTINUED | OUTPATIENT
Start: 2021-07-07 | End: 2021-07-07

## 2021-07-07 RX ORDER — OXYTOCIN/0.9 % SODIUM CHLORIDE 30/500 ML
100 PLASTIC BAG, INJECTION (ML) INTRAVENOUS CONTINUOUS
Status: DISCONTINUED | OUTPATIENT
Start: 2021-07-07 | End: 2021-07-09 | Stop reason: HOSPADM

## 2021-07-07 RX ORDER — ACETAMINOPHEN 325 MG/1
975 TABLET ORAL EVERY 6 HOURS
Status: DISCONTINUED | OUTPATIENT
Start: 2021-07-07 | End: 2021-07-09 | Stop reason: HOSPADM

## 2021-07-07 RX ORDER — MISOPROSTOL 200 UG/1
800 TABLET ORAL
Status: DISCONTINUED | OUTPATIENT
Start: 2021-07-07 | End: 2021-07-09 | Stop reason: HOSPADM

## 2021-07-07 RX ORDER — ACETAMINOPHEN 325 MG/1
975 TABLET ORAL ONCE
Status: COMPLETED | OUTPATIENT
Start: 2021-07-07 | End: 2021-07-07

## 2021-07-07 RX ORDER — BUPIVACAINE HYDROCHLORIDE 7.5 MG/ML
INJECTION, SOLUTION INTRASPINAL
Status: COMPLETED | OUTPATIENT
Start: 2021-07-07 | End: 2021-07-07

## 2021-07-07 RX ORDER — CEFAZOLIN SODIUM 2 G/100ML
2 INJECTION, SOLUTION INTRAVENOUS
Status: COMPLETED | OUTPATIENT
Start: 2021-07-07 | End: 2021-07-07

## 2021-07-07 RX ORDER — NALOXONE HYDROCHLORIDE 0.4 MG/ML
0.4 INJECTION, SOLUTION INTRAMUSCULAR; INTRAVENOUS; SUBCUTANEOUS
Status: DISCONTINUED | OUTPATIENT
Start: 2021-07-07 | End: 2021-07-09 | Stop reason: HOSPADM

## 2021-07-07 RX ORDER — KETOROLAC TROMETHAMINE 30 MG/ML
INJECTION, SOLUTION INTRAMUSCULAR; INTRAVENOUS PRN
Status: DISCONTINUED | OUTPATIENT
Start: 2021-07-07 | End: 2021-07-07

## 2021-07-07 RX ORDER — ONDANSETRON 2 MG/ML
4 INJECTION INTRAMUSCULAR; INTRAVENOUS EVERY 6 HOURS PRN
Status: DISCONTINUED | OUTPATIENT
Start: 2021-07-07 | End: 2021-07-09 | Stop reason: HOSPADM

## 2021-07-07 RX ORDER — PNV NO.95/FERROUS FUM/FOLIC AC 28MG-0.8MG
1 TABLET ORAL DAILY
COMMUNITY
End: 2021-09-03

## 2021-07-07 RX ORDER — SIMETHICONE 80 MG
80 TABLET,CHEWABLE ORAL 4 TIMES DAILY PRN
Status: DISCONTINUED | OUTPATIENT
Start: 2021-07-07 | End: 2021-07-09 | Stop reason: HOSPADM

## 2021-07-07 RX ORDER — MODIFIED LANOLIN
OINTMENT (GRAM) TOPICAL
Status: DISCONTINUED | OUTPATIENT
Start: 2021-07-07 | End: 2021-07-09 | Stop reason: HOSPADM

## 2021-07-07 RX ORDER — TRANEXAMIC ACID 10 MG/ML
1 INJECTION, SOLUTION INTRAVENOUS EVERY 30 MIN PRN
Status: DISCONTINUED | OUTPATIENT
Start: 2021-07-07 | End: 2021-07-09 | Stop reason: HOSPADM

## 2021-07-07 RX ORDER — IBUPROFEN 400 MG/1
800 TABLET, FILM COATED ORAL EVERY 6 HOURS
Status: DISCONTINUED | OUTPATIENT
Start: 2021-07-08 | End: 2021-07-09 | Stop reason: HOSPADM

## 2021-07-07 RX ORDER — LIDOCAINE 40 MG/G
CREAM TOPICAL
Status: DISCONTINUED | OUTPATIENT
Start: 2021-07-07 | End: 2021-07-09 | Stop reason: HOSPADM

## 2021-07-07 RX ORDER — CEFAZOLIN SODIUM 1 G/3ML
1 INJECTION, POWDER, FOR SOLUTION INTRAMUSCULAR; INTRAVENOUS SEE ADMIN INSTRUCTIONS
Status: DISCONTINUED | OUTPATIENT
Start: 2021-07-07 | End: 2021-07-07 | Stop reason: HOSPADM

## 2021-07-07 RX ORDER — LIDOCAINE 40 MG/G
CREAM TOPICAL
Status: DISCONTINUED | OUTPATIENT
Start: 2021-07-07 | End: 2021-07-07

## 2021-07-07 RX ORDER — ONDANSETRON 4 MG/1
4 TABLET, ORALLY DISINTEGRATING ORAL EVERY 6 HOURS PRN
Status: DISCONTINUED | OUTPATIENT
Start: 2021-07-07 | End: 2021-07-09 | Stop reason: HOSPADM

## 2021-07-07 RX ORDER — EPHEDRINE SULFATE 50 MG/ML
5 INJECTION, SOLUTION INTRAMUSCULAR; INTRAVENOUS; SUBCUTANEOUS
Status: DISCONTINUED | OUTPATIENT
Start: 2021-07-07 | End: 2021-07-09 | Stop reason: HOSPADM

## 2021-07-07 RX ORDER — CEFAZOLIN SODIUM 2 G/100ML
INJECTION, SOLUTION INTRAVENOUS
Status: DISCONTINUED
Start: 2021-07-07 | End: 2021-07-07 | Stop reason: HOSPADM

## 2021-07-07 RX ORDER — CITRIC ACID/SODIUM CITRATE 334-500MG
SOLUTION, ORAL ORAL
Status: COMPLETED
Start: 2021-07-07 | End: 2021-07-07

## 2021-07-07 RX ORDER — OXYTOCIN/0.9 % SODIUM CHLORIDE 30/500 ML
340 PLASTIC BAG, INJECTION (ML) INTRAVENOUS CONTINUOUS PRN
Status: DISCONTINUED | OUTPATIENT
Start: 2021-07-07 | End: 2021-07-09 | Stop reason: HOSPADM

## 2021-07-07 RX ORDER — AMOXICILLIN 250 MG
2 CAPSULE ORAL 2 TIMES DAILY
Status: DISCONTINUED | OUTPATIENT
Start: 2021-07-07 | End: 2021-07-09 | Stop reason: HOSPADM

## 2021-07-07 RX ORDER — SODIUM CHLORIDE, SODIUM LACTATE, POTASSIUM CHLORIDE, CALCIUM CHLORIDE 600; 310; 30; 20 MG/100ML; MG/100ML; MG/100ML; MG/100ML
INJECTION, SOLUTION INTRAVENOUS CONTINUOUS
Status: DISCONTINUED | OUTPATIENT
Start: 2021-07-07 | End: 2021-07-07 | Stop reason: HOSPADM

## 2021-07-07 RX ORDER — AMOXICILLIN 250 MG
1 CAPSULE ORAL 2 TIMES DAILY
Status: DISCONTINUED | OUTPATIENT
Start: 2021-07-07 | End: 2021-07-09 | Stop reason: HOSPADM

## 2021-07-07 RX ORDER — OXYTOCIN 10 [USP'U]/ML
10 INJECTION, SOLUTION INTRAMUSCULAR; INTRAVENOUS
Status: DISCONTINUED | OUTPATIENT
Start: 2021-07-07 | End: 2021-07-09 | Stop reason: HOSPADM

## 2021-07-07 RX ORDER — KETOROLAC TROMETHAMINE 30 MG/ML
30 INJECTION, SOLUTION INTRAMUSCULAR; INTRAVENOUS EVERY 6 HOURS
Status: COMPLETED | OUTPATIENT
Start: 2021-07-07 | End: 2021-07-08

## 2021-07-07 RX ORDER — BISACODYL 10 MG
10 SUPPOSITORY, RECTAL RECTAL DAILY PRN
Status: DISCONTINUED | OUTPATIENT
Start: 2021-07-09 | End: 2021-07-09 | Stop reason: HOSPADM

## 2021-07-07 RX ORDER — DEXTROSE, SODIUM CHLORIDE, SODIUM LACTATE, POTASSIUM CHLORIDE, AND CALCIUM CHLORIDE 5; .6; .31; .03; .02 G/100ML; G/100ML; G/100ML; G/100ML; G/100ML
INJECTION, SOLUTION INTRAVENOUS CONTINUOUS
Status: DISCONTINUED | OUTPATIENT
Start: 2021-07-07 | End: 2021-07-09 | Stop reason: HOSPADM

## 2021-07-07 RX ORDER — HYDROCORTISONE 2.5 %
CREAM (GRAM) TOPICAL 3 TIMES DAILY PRN
Status: DISCONTINUED | OUTPATIENT
Start: 2021-07-07 | End: 2021-07-09 | Stop reason: HOSPADM

## 2021-07-07 RX ORDER — CITRIC ACID/SODIUM CITRATE 334-500MG
30 SOLUTION, ORAL ORAL
Status: COMPLETED | OUTPATIENT
Start: 2021-07-07 | End: 2021-07-07

## 2021-07-07 RX ADMIN — PHENYLEPHRINE HYDROCHLORIDE 0.5 MCG/KG/MIN: 10 INJECTION INTRAVENOUS at 09:05

## 2021-07-07 RX ADMIN — ACETAMINOPHEN 975 MG: 325 TABLET, FILM COATED ORAL at 14:38

## 2021-07-07 RX ADMIN — SODIUM CITRATE AND CITRIC ACID MONOHYDRATE 30 ML: 500; 334 SOLUTION ORAL at 08:49

## 2021-07-07 RX ADMIN — SODIUM CHLORIDE, POTASSIUM CHLORIDE, SODIUM LACTATE AND CALCIUM CHLORIDE: 600; 310; 30; 20 INJECTION, SOLUTION INTRAVENOUS at 09:33

## 2021-07-07 RX ADMIN — SENNOSIDES AND DOCUSATE SODIUM 1 TABLET: 8.6; 5 TABLET ORAL at 20:59

## 2021-07-07 RX ADMIN — HYDROMORPHONE HYDROCHLORIDE 0.5 MG: 1 INJECTION, SOLUTION INTRAMUSCULAR; INTRAVENOUS; SUBCUTANEOUS at 10:06

## 2021-07-07 RX ADMIN — KETOROLAC TROMETHAMINE 30 MG: 30 INJECTION, SOLUTION INTRAMUSCULAR; INTRAVENOUS at 16:26

## 2021-07-07 RX ADMIN — ACETAMINOPHEN 975 MG: 325 TABLET, FILM COATED ORAL at 20:59

## 2021-07-07 RX ADMIN — KETOROLAC TROMETHAMINE 30 MG: 30 INJECTION, SOLUTION INTRAMUSCULAR at 10:07

## 2021-07-07 RX ADMIN — Medication: at 11:02

## 2021-07-07 RX ADMIN — PHENYLEPHRINE HYDROCHLORIDE 100 MCG: 10 INJECTION INTRAVENOUS at 09:05

## 2021-07-07 RX ADMIN — BUPIVACAINE HYDROCHLORIDE IN DEXTROSE 1.4 ML: 7.5 INJECTION, SOLUTION SUBARACHNOID at 09:05

## 2021-07-07 RX ADMIN — Medication 100 ML/HR: at 13:29

## 2021-07-07 RX ADMIN — HYDROMORPHONE HYDROCHLORIDE 0.5 MG: 1 INJECTION, SOLUTION INTRAMUSCULAR; INTRAVENOUS; SUBCUTANEOUS at 09:53

## 2021-07-07 RX ADMIN — SODIUM CHLORIDE, POTASSIUM CHLORIDE, SODIUM LACTATE AND CALCIUM CHLORIDE: 600; 310; 30; 20 INJECTION, SOLUTION INTRAVENOUS at 07:43

## 2021-07-07 RX ADMIN — SODIUM CHLORIDE, SODIUM LACTATE, POTASSIUM CHLORIDE, CALCIUM CHLORIDE AND DEXTROSE MONOHYDRATE: 5; 600; 310; 30; 20 INJECTION, SOLUTION INTRAVENOUS at 18:31

## 2021-07-07 RX ADMIN — Medication 30 ML: at 08:49

## 2021-07-07 RX ADMIN — ACETAMINOPHEN 975 MG: 325 TABLET ORAL at 08:13

## 2021-07-07 RX ADMIN — CEFAZOLIN SODIUM 2 G: 2 INJECTION, SOLUTION INTRAVENOUS at 09:00

## 2021-07-07 RX ADMIN — ONDANSETRON 4 MG: 2 INJECTION INTRAMUSCULAR; INTRAVENOUS at 08:54

## 2021-07-07 RX ADMIN — Medication 340 ML/HR: at 09:36

## 2021-07-07 RX ADMIN — OXYCODONE HYDROCHLORIDE 5 MG: 5 TABLET ORAL at 22:31

## 2021-07-07 RX ADMIN — ACETAMINOPHEN 975 MG: 325 TABLET, FILM COATED ORAL at 08:13

## 2021-07-07 RX ADMIN — KETOROLAC TROMETHAMINE 30 MG: 30 INJECTION, SOLUTION INTRAMUSCULAR; INTRAVENOUS at 22:32

## 2021-07-07 RX ADMIN — OXYCODONE HYDROCHLORIDE 5 MG: 5 TABLET ORAL at 18:35

## 2021-07-07 ASSESSMENT — MIFFLIN-ST. JEOR: SCORE: 1736.52

## 2021-07-07 NOTE — H&P
"Labor and delivery admit note.  HPI  Zelda Paulson  is a 26 year old   who was admitted for scheduled repeat  section.   Estimated Date of Delivery: 2021  39w0d  Prenatal care - early and adequate with Dr. Rankin, dated by LMP c/w first trimester USG   Prenatal course - uncomplicated    Prenatal labs  Blood type A, Rh positive  HIV-negative  Hepatitis BsAg- negative  Trep AB- Negative  Rubella- Immune  Pap- normal  GC/Chlamydia- negative  Quad screen- normal  Glucola- normal/abnormal  GBS- negative.    Past Medical History:   Diagnosis Date     Chronic kidney disease     kidney stones     Thyroid disease      Uncomplicated asthma      Past Surgical History:   Procedure Laterality Date     APPENDECTOMY       BREAST SURGERY      breast lumpectomy      SECTION N/A 2018    Procedure:  SECTION;;  Surgeon: Lela Looney MD;  Location:  L+D      SECTION  2018     ENT SURGERY      tonsillectomy     LAPAROSCOPIC CHOLECYSTECTOMY WITH CHOLANGIOGRAMS N/A 2019    Procedure: LAPAROSCOPIC CHOLECYSTECTOMY WITH POSSIBLE INTRAOPERATIVE CHOLANGIOGRAMS;  Surgeon: Valentín Butler MD;  Location:  OR     Social History     Tobacco Use     Smoking status: Never Smoker     Smokeless tobacco: Never Used   Substance Use Topics     Alcohol use: Not Currently     Comment: occ     Drug use: No       Objective  Alert and oriented  /82   Pulse 76   Temp 97.2  F (36.2  C) (Temporal)   Resp 16   Ht 1.575 m (5' 2\")   Wt 104.3 kg (230 lb)   BMI 42.07 kg/m    Heart and Lungs- normal  PA- uterus full term  FHR- 130 baseline, good variability, accels +, no decels  Pelvic (by admitting RN )  Cx: deferred    Assessment/Plan:   at 39 weeks - scheduled RLTCS  Uneventful prenatal course.    Expectant.  Consents for  section and possible blood transfusion signed  Routine intrapartum management.    Angie Rankin MD  "

## 2021-07-07 NOTE — ANESTHESIA PREPROCEDURE EVALUATION
Anesthesia Pre-Procedure Evaluation    Patient: Zelda Paulson   MRN: [id number] : 1995        Preoperative Diagnosis: Previous  section [Z98.891]   Procedure : Procedure(s):  REPEAT  SECTION     Past Medical History:   Diagnosis Date     Chronic kidney disease     kidney stones     Thyroid disease      Uncomplicated asthma       Past Surgical History:   Procedure Laterality Date     APPENDECTOMY       BREAST SURGERY      breast lumpectomy      SECTION N/A 2018    Procedure:  SECTION;;  Surgeon: Lela Looney MD;  Location:  [facility]      SECTION  2018     ENT SURGERY      tonsillectomy     LAPAROSCOPIC CHOLECYSTECTOMY WITH CHOLANGIOGRAMS N/A 2019    Procedure: LAPAROSCOPIC CHOLECYSTECTOMY WITH POSSIBLE INTRAOPERATIVE CHOLANGIOGRAMS;  Surgeon: Valentín Butler MD;  Location:  OR      Allergies   Allergen Reactions     Fentanyl Anaphylaxis     itching     Morphine Anaphylaxis     Sulfa Drugs Rash      Social History     Tobacco Use     Smoking status: Never Smoker     Smokeless tobacco: Never Used   Substance Use Topics     Alcohol use: Not Currently     Comment: occ      Wt Readings from Last 1 Encounters:   21 104.3 kg (230 lb)        Anesthesia Evaluation   Pt has had prior anesthetic.     No history of anesthetic complications       ROS/MED HX  ENT/Pulmonary:     (+) asthma     Neurologic:  - neg neurologic ROS     Cardiovascular:    (-) PIH   METS/Exercise Tolerance:     Hematologic:     (+) no anticoagulation therapy, no coagulopathy,     Musculoskeletal:       GI/Hepatic:     (+) GERD,     Renal/Genitourinary:       Endo: Comment: PCOS    (+) thyroid problem, hypothyroidism, Obesity,     Psychiatric/Substance Use:       Infectious Disease:       Malignancy:       Other:            Physical Exam    Airway        Mallampati: II   TM distance: > 3 FB   Neck ROM: full     Respiratory Devices and Support         Dental   no notable dental history         Cardiovascular   cardiovascular exam normal          Pulmonary   pulmonary exam normal                OUTSIDE LABS:  CBC:   Lab Results   Component Value Date    WBC 8.1 04/10/2021    WBC 7.2 01/06/2019    HGB 10.2 (L) 07/07/2021    HGB 10.0 (L) 04/10/2021    HCT 31.6 (L) 04/10/2021    HCT 37.4 01/06/2019     04/10/2021     01/06/2019     BMP:   Lab Results   Component Value Date     01/06/2019     08/09/2017    POTASSIUM 3.5 01/06/2019    POTASSIUM 3.5 08/09/2017    CHLORIDE 107 01/06/2019    CHLORIDE 104 08/09/2017    CO2 27 01/06/2019    CO2 26 08/09/2017    BUN 12 01/06/2019    BUN 11 08/09/2017    CR 0.83 01/06/2019    CR 0.77 08/09/2017    GLC 75 01/06/2019    GLC 73 08/09/2017     COAGS:   Lab Results   Component Value Date    INR 0.98 04/10/2021    FIBR 459 (H) 04/10/2021     POC:   Lab Results   Component Value Date    HCG Negative 01/09/2019     HEPATIC:   Lab Results   Component Value Date    ALBUMIN 3.6 01/06/2019    PROTTOTAL 7.7 01/06/2019     (H) 01/06/2019     (H) 01/06/2019    ALKPHOS 146 01/06/2019    BILITOTAL 0.3 01/06/2019     OTHER:   Lab Results   Component Value Date    LACT 1.1 08/08/2017    A1C 5.3 07/14/2017    SHONNA 8.4 (L) 01/06/2019    LIPASE 152 01/06/2019       Anesthesia Plan    ASA Status:  2      Anesthesia Type: Spinal.              Consents    Anesthesia Plan(s) and associated risks, benefits, and realistic alternatives discussed. Questions answered and patient/representative(s) expressed understanding.     - Discussed with:  Patient         Postoperative Care            Comments:    Anaphylaxis to fentanyl and morphine.  Spinal with straight bupivacaine.              Jessica Horowitz MD, MD

## 2021-07-07 NOTE — PLAN OF CARE
Pt. admitted from L&D via bed.. Pt. arrived with baby and was accompanied by spouse ( Justin ) and arrived with personal belongings. Report was taken from Veronica MCINTOSH RN in L&D.  Pt. is A&Ox3 and VSS on RA. Fundus is firm and midline.  Vaginal bleeding is light/scant.   Pt. c/o 6/10 pain. Pt. denied  nausea, CP, SOB, lightheadedness, or dizziness.  PIV patent and infusing.  Broderick patent and draining..Liquid bandage intactPt. oriented to the room and call light system.

## 2021-07-07 NOTE — PLAN OF CARE
Multip presents to MAC for her scheduled repeat  with Dr. Rankin.  DIscussed plan of care including EFM, preop lab work, IV fluids and medications.

## 2021-07-07 NOTE — ANESTHESIA POSTPROCEDURE EVALUATION
Patient: Zelda Paulson    Procedure(s):  REPEAT  SECTION    Diagnosis:Previous  section [Z98.891]  Diagnosis Additional Information: No value filed.    Anesthesia Type:  Spinal    Note:  Disposition: Inpatient   Postop Pain Control: Uneventful            Sign Out: Well controlled pain   PONV: No   Neuro/Psych: Uneventful            Sign Out: Acceptable/Baseline neuro status   Airway/Respiratory: Uneventful            Sign Out: Acceptable/Baseline resp. status   CV/Hemodynamics: Uneventful            Sign Out: Acceptable CV status; No obvious hypovolemia; No obvious fluid overload   Other NRE: NONE   DID A NON-ROUTINE EVENT OCCUR? No           Last vitals:  Vitals:    21 1245 21 1325 21 1425   BP: 116/71 118/70 121/65   Pulse: 59  61   Resp: 16 16 16   Temp:      SpO2: 99% 99% 100%       Last vitals prior to Anesthesia Care Transfer:  CRNA VITALS  2021 0956 - 2021 1056      2021             Resp Rate (set):  10          Electronically Signed By: Jessica Horowitz MD, MD  2021  2:37 PM

## 2021-07-07 NOTE — PLAN OF CARE
Vss, fundus firm with scant flow. Liquid bandage clean, dry and intact. Pain managed with scheduled tylenol and Toradol. PCA d/c'd and will start oral oxycodone for pain management Wearing abdominal binder for comfort. Pt with pitocin infusing at 100mls/hr. Broderick draining. Pt tolerating good po intake and regular diet. Pt up to bathroom for luis care w/ stby assist. Breast feeding  well. Spouse at bedside and supportive. Encouraged to call with questions/needs.

## 2021-07-07 NOTE — ANESTHESIA PROCEDURE NOTES
Intrathecal injection Procedure Note  Pre-Procedure   Staff -        Anesthesiologist:  Jessica Horowitz MD       Performed By: anesthesiologist       Location: OR       Pre-Anesthestic Checklist: patient identified, IV checked, risks and benefits discussed, informed consent, monitors and equipment checked, pre-op evaluation, at physician/surgeon's request and post-op pain management  Timeout:       Correct Patient: Yes        Correct Procedure: Yes        Correct Site: Yes        Correct Position: Yes   Procedure Documentation  Procedure: intrathecal injection       Patient Position: sitting       Patient Prep/Sterile Barriers: sterile gloves, mask, patient draped       Skin prep: Betadine       Insertion Site: L3-4. (midline approach).       Needle Gauge: 24.        Needle Length (Inches): 4        Spinal Needle Type: Pencan       Introducer used       Introducer: 20 G       # of attempts: 1 and  # of redirects:  1    Assessment/Narrative         Paresthesias: No.       CSF fluid: clear.    Medication(s) Administered   0.75% Hyperbaric Bupivacaine (Intrathecal), 1.4 mL

## 2021-07-07 NOTE — OP NOTE
Section Operative Note    Zelda Paulson   : 1995     Date of service/delivery: 21   Place of delivery: [facility]    Pre-operative Diagnosis:  at 39w0d                                            H/o  section x 1, desires repeat    Post-operative Diagnosis: Same, delivered    Procedure done: RLTCS, lysis of dense adhesions    Surgeon: Angie Rankin MD    Assistant: None    Anesthesia: combined spinal-epidural    Complications:  None    EBL: 445 mL    IV fluids: crystalloid    Drains: chinchilla catheter draining clear, yellow urine    Findings:  Viable female infant in cephalic presentation and AI position  Weight 7lbs 0oz  APGARS- 8/8  3-V cord  Normal appearing uterus, fallopian tubes and ovaries.  Dense adhesions underlying rectus muscles and omentum requiring 20 minutes of careful dissection    Indications:   Patient is a  at 39w0d , who was admitted for scheduled repeat  section.  Baby is known to be in the cephalic position based on Leopold's maneuvers. Risks, benefits, and alternatives to the procedure were discussed and all questions were answered to the patient's satisfaction. Consent was reviewed and signed with a witness present.        Procedure Details:  IV antibiotics given per protocol.  SCD placed for VTE prophylaxis.  Spinal anesthesia administered, checked and found to be adequate.  Fetal heart tones obtained and reassuring.  Chinchilla catheter was in place.    The patient was draped and prepped in the usual sterile manner in the dorsal supine position with a left lateral tilt.  After a time-out was performed with all members of the surgical team in agreement, a Pfannenstiel incision was made with a scalpel and carried down through the subcutaneous tissue to the fascia. The fascia was then nicked on either side of the midline using a scalpel, and the facial incision was extended laterally using curved Velarde scissors.  The fascia  was then grasped with kocher clamps and  from the underlying rectus tissue superiorly and inferiorly using both blunt and sharp dissection.     The peritoneum was identified and attempted to be entered bluntly, but the muscle was densely scarred.  20 minutes of careful dissection and cutting of rectus muscles was required for visualization.  The abdomen was stretched laterally for adequate visualization of abdominal structures.  Peritoneal incision was extended with careful visualization of bladder.  The bladder blade was inserted.  The utero-vesical peritoneal reflection was opened sharply and extended laterally.  The bladder blade was then replaced to displace the bladder from the surgical field.  At this time, the uterus was visualized and hand was placed into the abdomen to palpate fetal lie and determine the level of the fetal head.     A transverse incision made in the lower uterine segment above the bladder using a scalpel until fetal membranes were visualized.  The membranes were ruptured for clear fluid.  A hand was placed through the hysterotomy for elevation of the fetal head, and the infant was delivered in cephalic presentation.  Nose and mouth were bulb-suctioned, and the cord was clamped and cut and the infant was handed off to nursing staff for further assessment.  Cord blood was sampled.  The placenta was next delivered without difficulty using gentle manual traction on the cord.      At this time, the uterus was exteriorized and any remaining membranes were swept from the uterine wall using a laparotomy pad.  The uterine incision was next closed with running locked sutures of 0 Vicryl on a CTX needle. A second imbricating layer was not needed due to excellent hemostasis.  Next, copious irrigation and suctioning of the peritoneal cavity was carried out.  The uterus was placed back into the abdomen.  Para-colic gutters were cleared of all clots and debris.  The hysterotomy once again checked  to ascertain hemostasis.          The fascia was closed with running sutures of 0 Vicryl on a CT-1 needle.  Subcutaneous tissue re-approximated with plain catgut.  The skin was closed in a subcuticular fashion with 4-0 monocryl.  Dermabond was then placed over the incision.  At this time, the case was completed.  After frog-legging the patient at the completion of the procedure, firm fundal pressure was applied and uterine clot was evacuated, with the uterus firm at the umbilicus.      Instrument, sponge, and needle counts were correct x 3     The mother and infant were brought back to the room in stable condition.    Angie Rankin MD

## 2021-07-08 LAB — HGB BLD-MCNC: 8.2 G/DL (ref 11.7–15.7)

## 2021-07-08 PROCEDURE — 120N000012 HC R&B POSTPARTUM

## 2021-07-08 PROCEDURE — 85018 HEMOGLOBIN: CPT | Performed by: OBSTETRICS & GYNECOLOGY

## 2021-07-08 PROCEDURE — 250N000013 HC RX MED GY IP 250 OP 250 PS 637: Performed by: OBSTETRICS & GYNECOLOGY

## 2021-07-08 PROCEDURE — 258N000003 HC RX IP 258 OP 636: Performed by: OBSTETRICS & GYNECOLOGY

## 2021-07-08 PROCEDURE — 36415 COLL VENOUS BLD VENIPUNCTURE: CPT | Performed by: OBSTETRICS & GYNECOLOGY

## 2021-07-08 PROCEDURE — 250N000011 HC RX IP 250 OP 636: Performed by: OBSTETRICS & GYNECOLOGY

## 2021-07-08 RX ORDER — METHYLPREDNISOLONE SODIUM SUCCINATE 125 MG/2ML
125 INJECTION, POWDER, LYOPHILIZED, FOR SOLUTION INTRAMUSCULAR; INTRAVENOUS
Status: DISCONTINUED | OUTPATIENT
Start: 2021-07-08 | End: 2021-07-09 | Stop reason: HOSPADM

## 2021-07-08 RX ORDER — DIPHENHYDRAMINE HYDROCHLORIDE 50 MG/ML
50 INJECTION INTRAMUSCULAR; INTRAVENOUS
Status: DISCONTINUED | OUTPATIENT
Start: 2021-07-08 | End: 2021-07-09 | Stop reason: HOSPADM

## 2021-07-08 RX ADMIN — OXYCODONE HYDROCHLORIDE 5 MG: 5 TABLET ORAL at 02:55

## 2021-07-08 RX ADMIN — ACETAMINOPHEN 975 MG: 325 TABLET, FILM COATED ORAL at 09:02

## 2021-07-08 RX ADMIN — ACETAMINOPHEN 975 MG: 325 TABLET, FILM COATED ORAL at 21:17

## 2021-07-08 RX ADMIN — IBUPROFEN 800 MG: 400 TABLET ORAL at 12:23

## 2021-07-08 RX ADMIN — KETOROLAC TROMETHAMINE 30 MG: 30 INJECTION, SOLUTION INTRAMUSCULAR; INTRAVENOUS at 05:38

## 2021-07-08 RX ADMIN — SENNOSIDES AND DOCUSATE SODIUM 2 TABLET: 8.6; 5 TABLET ORAL at 19:43

## 2021-07-08 RX ADMIN — SENNOSIDES AND DOCUSATE SODIUM 1 TABLET: 8.6; 5 TABLET ORAL at 07:07

## 2021-07-08 RX ADMIN — ACETAMINOPHEN 975 MG: 325 TABLET, FILM COATED ORAL at 15:14

## 2021-07-08 RX ADMIN — OXYCODONE HYDROCHLORIDE 5 MG: 5 TABLET ORAL at 15:14

## 2021-07-08 RX ADMIN — OXYCODONE HYDROCHLORIDE 5 MG: 5 TABLET ORAL at 23:46

## 2021-07-08 RX ADMIN — ACETAMINOPHEN 975 MG: 325 TABLET, FILM COATED ORAL at 02:59

## 2021-07-08 RX ADMIN — IBUPROFEN 800 MG: 400 TABLET ORAL at 18:27

## 2021-07-08 RX ADMIN — IRON SUCROSE 200 MG: 20 INJECTION, SOLUTION INTRAVENOUS at 12:23

## 2021-07-08 RX ADMIN — OXYCODONE HYDROCHLORIDE 5 MG: 5 TABLET ORAL at 07:08

## 2021-07-08 RX ADMIN — OXYCODONE HYDROCHLORIDE 5 MG: 5 TABLET ORAL at 19:42

## 2021-07-08 NOTE — PLAN OF CARE
VSS. Broderick patent w/ adequate output pulled at 0550. Scant vag bleeding. Incision open to air with liquid bandage. Pain controlled w/ toradol and oxycodone. Breastfeeding well when infant awake.

## 2021-07-08 NOTE — PLAN OF CARE
Vss, fundus firm with scant flow. Incision intact with liquid bandage. Wearing abdominal binder for comfort and taking tylenol, ibuprofen and oxycodone prn for adequate pain control. Pt tolerating good po intake and regular diet. Pt received venefor today due to Hgb 8.2, receive one more dose of venefor tomorrow. Breast feeding  well. Spouse at bedside and supportive. Encouraged to call with questions/needs.

## 2021-07-08 NOTE — LACTATION NOTE
Initial visit.   Breastfeeding general information reviewed.   Advised to breastfeed exclusively, on demand, avoid pacifiers, bottles and formula unless medically indicated.  Encouraged rooming in, skin to skin, feeding on demand 8-12x/day or sooner if baby cues.  Explained benefits of holding and skin to skin.  Encouraged lots of skin to skin. Instructed on hand expression.   Continues to nurse well per mom. No further questions at this time. Outpatient resource phone numbers given. Has a breast pump for home.    Will follow as needed.   Janey MATAN, RN, PHN, RNC-MNN, IBCLC

## 2021-07-08 NOTE — PROGRESS NOTES
[facility]       DAILY NOTE - POSTPARTUM DAY 1     SUBJECTIVE:     Pain controlled? Yes  Tolerating a regular diet? YES  Ambulating? YES  Voiding without difficulty? No: chinchilla just out    OBJECTIVE:  Vitals:    21 0255 21 0342 21 0538 21 0630   BP:  99/59     Pulse:  64     Resp: 16 16 18 16   Temp:  97.4  F (36.3  C)     TempSrc:  Oral     SpO2: 99% 100%     Weight:       Height:           Constitutional: healthy, alert and no distress    Abdomen:  Uterine fundus is firm, non-tender and at the level of the umbilicus     Incision: Healing well      LABS:  Hemoglobin   Date Value Ref Range Status   2021 8.2 (L) 11.7 - 15.7 g/dL Final   2021 10.2 (L) 11.7 - 15.7 g/dL Final       ASSESSMENT:  Post-partum day #1 s/p  Section  Pregnancy complicated by chronic anemia    Doing well.       PLAN:   Continue routine postpartum cares  Will start alejandro Griffin MD

## 2021-07-09 VITALS
HEART RATE: 62 BPM | BODY MASS INDEX: 42.33 KG/M2 | TEMPERATURE: 98.4 F | HEIGHT: 62 IN | DIASTOLIC BLOOD PRESSURE: 59 MMHG | SYSTOLIC BLOOD PRESSURE: 116 MMHG | WEIGHT: 230 LBS | OXYGEN SATURATION: 100 % | RESPIRATION RATE: 16 BRPM

## 2021-07-09 PROCEDURE — 250N000013 HC RX MED GY IP 250 OP 250 PS 637: Performed by: OBSTETRICS & GYNECOLOGY

## 2021-07-09 PROCEDURE — 250N000011 HC RX IP 250 OP 636: Performed by: OBSTETRICS & GYNECOLOGY

## 2021-07-09 PROCEDURE — 258N000003 HC RX IP 258 OP 636: Performed by: OBSTETRICS & GYNECOLOGY

## 2021-07-09 RX ORDER — OXYCODONE HYDROCHLORIDE 5 MG/1
5 TABLET ORAL EVERY 4 HOURS PRN
Qty: 20 TABLET | Refills: 0 | Status: SHIPPED | OUTPATIENT
Start: 2021-07-09 | End: 2021-09-03

## 2021-07-09 RX ORDER — IBUPROFEN 600 MG/1
600 TABLET, FILM COATED ORAL EVERY 6 HOURS PRN
Qty: 60 TABLET | Refills: 0 | Status: SHIPPED | OUTPATIENT
Start: 2021-07-09 | End: 2021-09-03

## 2021-07-09 RX ADMIN — OXYCODONE HYDROCHLORIDE 5 MG: 5 TABLET ORAL at 04:26

## 2021-07-09 RX ADMIN — IBUPROFEN 800 MG: 400 TABLET ORAL at 12:53

## 2021-07-09 RX ADMIN — IBUPROFEN 800 MG: 400 TABLET ORAL at 00:28

## 2021-07-09 RX ADMIN — SENNOSIDES AND DOCUSATE SODIUM 1 TABLET: 8.6; 5 TABLET ORAL at 08:28

## 2021-07-09 RX ADMIN — OXYCODONE HYDROCHLORIDE 5 MG: 5 TABLET ORAL at 12:53

## 2021-07-09 RX ADMIN — IRON SUCROSE 200 MG: 20 INJECTION, SOLUTION INTRAVENOUS at 08:56

## 2021-07-09 RX ADMIN — OXYCODONE HYDROCHLORIDE 5 MG: 5 TABLET ORAL at 08:29

## 2021-07-09 RX ADMIN — ACETAMINOPHEN 975 MG: 325 TABLET, FILM COATED ORAL at 04:26

## 2021-07-09 RX ADMIN — IBUPROFEN 800 MG: 400 TABLET ORAL at 06:31

## 2021-07-09 RX ADMIN — ACETAMINOPHEN 975 MG: 325 TABLET, FILM COATED ORAL at 10:49

## 2021-07-09 NOTE — PLAN OF CARE
Voiding adequately on own. Scant vag bleeding. Pain controlled w/ oxycodone, ibuprofen, and tylenol. Breast feeding well, nipples getting tender- using lanolin and sore nipple shells.

## 2021-07-09 NOTE — PLAN OF CARE
Vital signs stable. Postpartum assessment WDL. Incision clean, dry, and intact. Pain controlled with Tylenol/Ibuprofen/Oxycodone. IV saline locked, will receive Venefor in the morning.Patient ambulating independently. Patient passing gas. Breastfeeding on cue with no assist. Patient and infant bonding well. Will continue with current plan of care.

## 2021-07-09 NOTE — DISCHARGE INSTRUCTIONS
Discharge Instructions    You should set up an appointment to see your doctor in 2 weeks for an incision check then again at 6 weeks after delivery for a postpartum exam. You should also call if you develop any heavy vaginal bleeding worse than a menstrual period, or fever over 100.5 degrees, or vomiting or increased pain.    You should abstain from intercourse for six weeks after delivery. You should avoid a bath for 1 week after delivery but showering is ok. If you have any questions about yourself or the baby, feel free to call or if any urgent concerns after hours, please go to the emergency room.    Angie Rankin MD     Postop  Birth Instructions    Activity       Do not lift more than 10 pounds for 6 weeks after surgery.  Ask family and friends for help when you need it.    No driving until you have stopped taking your pain medications (usually two weeks after surgery).    No heavy exercise or activity for 6 weeks.  Don't do anything that will put a strain on your surgery site.    Don't strain when using the toilet.  Your care team may prescribe a stool softener if you have problems with your bowel movements.     To care for your incision:       Keep the incision clean and dry.    Do not soak your incision in water. No swimming or hot tubs until it has fully healed. You may soak in the bathtub if the water level is below your incision.    Do not use peroxide, gel, cream, lotion, or ointment on your incision.    Adjust your clothes to avoid pressure on your surgery site (check the elastic in your underwear for example).     You may see a small amount of clear or pink drainage and this is normal.  Check with your health care provider:       If the drainage increases or has an odor.    If the incision reddens, you have swelling, or develop a rash.    If you have increased pain and the medicine we prescribed doesn't help.    If you have a fever above 100.4 F (38 C) with or without chills when  placing thermometer under your tongue.   The area around your incision (surgery wound), will feel numb.  This is normal. The numbness should go away in less than a year.     Keep your hands clean:  Always wash your hands before touching your incision (surgery wound). This helps reduce your risk of infection. If your hands aren't dirty, you may use an alcohol hand-rub to clean your hands. Keep your nails clean and short.    Call your healthcare provider if you have any of these symptoms:       You soak a sanitary pad with blood within 1 hour, or you see blood clots larger than a golf ball.    Bleeding that lasts more than 6 weeks.    Vaginal discharge that smells bad.    Severe pain, cramping or tenderness in your lower belly area.    A need to urinate more frequently (use the toilet more often), more urgently (use the toilet very quickly), or it burns when you urinate.    Nausea and vomiting.    Redness, swelling or pain around a vein in your leg.    Problems breastfeeding or a red or painful area on your breast.    Chest pain and cough or are gasping for air.    Problems with coping with sadness, anxiety or depression. If you have concerns about hurting yourself or the baby, call your provider immediately.      You have questions or concerns after you return home.

## 2021-07-09 NOTE — PROGRESS NOTES
"Zelda Paulson  July 9, 2021    S: Pt is doing well, no acute events overnight.  She is tolerating po intake and her pain is well controlled.  She is ambulating without difficulty and passing flatus.  She endorses decreasing lochia.     She is voiding spontaneously.  She denies SOB, chest pain, HA, nausea/vomiting, fevers/chills.  She is Breastfeeding without difficulty.  She has no complaints at this time.    O:/59   Pulse 66   Temp 98.5  F (36.9  C) (Oral)   Resp 16   Ht 1.575 m (5' 2\")   Wt 104.3 kg (230 lb)   SpO2 100%   Breastfeeding Unknown   BMI 42.07 kg/m    Recent Labs   Lab 07/08/21  0750 07/07/21  0740   HGB 8.2* 10.2*     Abdomen: soft, non distended, fundus firm 2 cm below the umbilicus.  Incision is C/D/I with suture, no signs of cellulitis or infection  Ext: non tender, no edema or erythema    A/P: s/p LTCS POD #2 - doing well post-partum.    Continue routine postpartum care  Lochia is minimal; Hgb 8.2 - s/p IV venofer; asymptomatic from her acute blood loss on top of chronic anemia.  Encourage increased ambulation  Discussed contraceptive options, which will be re-addressed at 6 week post-partum visit  Discharge planning for today or tomorrow.    Angie Rankin MD  "

## 2021-07-09 NOTE — PLAN OF CARE
Vital signs stable. Incision intact. Lung sounds clear. Patient reports pain being well controlled with Tylenol, Ibuprofen, and Oxycodone. Planning on discharging home today. Questions and concerns addressed.

## 2021-07-21 NOTE — DISCHARGE SUMMARY
[facility] Discharge Summary    Zelda Paulson MRN# [id number]   Age: 26 year old YOB: 1995     Date of Admission:  2021  Date of Discharge::  2021  1:22 PM   Admitting Physician:  Angie Rankin MD  Discharge Physician:  Angie Rankin MD     Home clinic: [facility]          Admission Diagnoses:   Scheduled repeat  section          Discharge Diagnosis:   Same, acute blood loss anemia on top of chronic anemia         Procedures:   Procedure(s): Repeat low transverse  section              Medications Prior to Admission:     No medications prior to admission.             Discharge Medications:     Discharge Medication List as of 2021  9:38 AM      START taking these medications    Details   ibuprofen (ADVIL/MOTRIN) 600 MG tablet Take 1 tablet (600 mg) by mouth every 6 hours as needed for moderate pain, Disp-60 tablet, R-0, Local Print      oxyCODONE (ROXICODONE) 5 MG tablet Take 1 tablet (5 mg) by mouth every 4 hours as needed, Disp-20 tablet, R-0, Local Print         CONTINUE these medications which have NOT CHANGED    Details   albuterol (PROAIR HFA/PROVENTIL HFA/VENTOLIN HFA) 108 (90 BASE) MCG/ACT Inhaler Inhale 2 puffs into the lungs every 4 hours as needed for shortness of breath / dyspnea or wheezing, Historical      Ferrous Sulfate (IRON) 325 (65 Fe) MG tablet Take 1 tablet by mouth daily, Historical      LEVOTHYROXINE SODIUM PO Take 137 mcg by mouth daily , Historical      omeprazole (PRILOSEC) 20 MG DR capsule Take 20 mg by mouth daily, Historical      Prenatal Vit-Fe Fumarate-FA (PRENATAL MULTIVITAMIN W/IRON) 27-0.8 MG tablet Take 1 tablet by mouth daily, Historical                   Consultations:   No consultations were requested during this admission          Brief History of Labor:   See delivery note.           Hospital Course:   The patient's hospital course was unremarkable.  She recovered as anticipated and experienced no  post-operative complications.  Upon discharge, her pain was well controlled. Vaginal bleeding is mild to moderate.  Voiding without difficulty.  Ambulating well and tolerating a normal diet.  No fever or significant wound drainage.  Breastfeeding well.  Infant is stable.  She had a bowel movement prior to discharge.  She was discharged on post-partum day #2.    Post-partum hemoglobin:   Hemoglobin   Date Value Ref Range Status   07/08/2021 8.2 (L) 11.7 - 15.7 g/dL Final   Acute blood loss anemia on top of chronic anemia - IV venofer given, discharge on oral iron supplementation         Discharge Instructions and Follow-Up:   Discharge diet: Regular   Discharge activity: Activity as tolerated, no lifting greater than 10 lbs   Discharge follow-up: Follow up with consultant in 6 weeks for post partum visit   Wound care: Keep wound clean and dry           Discharge Disposition:   Discharged to home      Attestation:  I have reviewed today's vital signs, notes, medications, labs and imaging.  Amount of time performed on this discharge summary: 10 minutes.    Angie Rankin MD

## 2021-08-06 ENCOUNTER — MEDICAL CORRESPONDENCE (OUTPATIENT)
Dept: HEALTH INFORMATION MANAGEMENT | Facility: CLINIC | Age: 26
End: 2021-08-06

## 2021-09-03 ENCOUNTER — VIRTUAL VISIT (OUTPATIENT)
Dept: SURGERY | Facility: CLINIC | Age: 26
End: 2021-09-03
Payer: COMMERCIAL

## 2021-09-03 VITALS
DIASTOLIC BLOOD PRESSURE: 88 MMHG | SYSTOLIC BLOOD PRESSURE: 138 MMHG | HEIGHT: 61 IN | WEIGHT: 212.7 LBS | BODY MASS INDEX: 40.16 KG/M2

## 2021-09-03 DIAGNOSIS — Z13.0 SCREENING FOR IRON DEFICIENCY ANEMIA: ICD-10-CM

## 2021-09-03 DIAGNOSIS — Z13.228 SCREENING FOR ENDOCRINE, NUTRITIONAL, METABOLIC AND IMMUNITY DISORDER: ICD-10-CM

## 2021-09-03 DIAGNOSIS — Z13.0 SCREENING FOR ENDOCRINE, NUTRITIONAL, METABOLIC AND IMMUNITY DISORDER: ICD-10-CM

## 2021-09-03 DIAGNOSIS — E66.01 MORBID OBESITY (H): Primary | ICD-10-CM

## 2021-09-03 DIAGNOSIS — Z13.21 SCREENING FOR ENDOCRINE, NUTRITIONAL, METABOLIC AND IMMUNITY DISORDER: ICD-10-CM

## 2021-09-03 DIAGNOSIS — E61.1 IRON DEFICIENCY: ICD-10-CM

## 2021-09-03 DIAGNOSIS — Z13.29 SCREENING FOR ENDOCRINE, NUTRITIONAL, METABOLIC AND IMMUNITY DISORDER: ICD-10-CM

## 2021-09-03 DIAGNOSIS — J45.20 MILD INTERMITTENT ASTHMA WITHOUT COMPLICATION: ICD-10-CM

## 2021-09-03 DIAGNOSIS — E28.2 PCOS (POLYCYSTIC OVARIAN SYNDROME): ICD-10-CM

## 2021-09-03 PROCEDURE — 99417 PROLNG OP E/M EACH 15 MIN: CPT | Mod: 95 | Performed by: PHYSICIAN ASSISTANT

## 2021-09-03 PROCEDURE — 99215 OFFICE O/P EST HI 40 MIN: CPT | Mod: 95 | Performed by: PHYSICIAN ASSISTANT

## 2021-09-03 ASSESSMENT — MIFFLIN-ST. JEOR: SCORE: 1642.18

## 2021-09-03 NOTE — LETTER
Zelda Paulson  September 3, 2021        Bariatric Task List      Required Weight loss:    Lose 5 lbs prior to surgery.  Goal Weight: 207 lbs  Tasks:  Have preoperative laboratory tests drawn.     Psychological Evaluation with MMPI and clearance for weight loss surgery.    Achieve clearance from dietitian to see surgeon.

## 2021-09-03 NOTE — PATIENT INSTRUCTIONS
Plan:   Please refer to your task list created today at your appointment.  Labs have been ordered in the system for you. You can have these drawn at any [facility] lab.  Please call [phone] set up an appointment.  Your results will be posted on [facility] as soon as they're complete.  After all are resulted, I will review and comment to you.  Make appointment to return to clinic in 1 month to see the dietician and Nida Fortune PA-C.  _____________________________________________________________________  In general before being submitted for insurance approval, you will need the following:  -Clearance by the Psychologist  -Clearance by the dietitian  -Structured weight loss visits if mandated by your insurance carrier  -Surgeon consult  -Use CPAP for at least one month before surgery if you have sleep apnea. Make sure to bring your CPAP or BiPAP to the hospital at the time of surgery.  -You will need to be tobacco free for 3 months before surgery and remain a non-smoker thereafter. If you are currently smoking or have recently quit, your urine will be evaluated for tobacco metabolites pre-operatively.  -If you have diabetes, you will need to have an A1C less than or equal to 8.0 within 3 months of surgery.  -You will need an exercise plan which includes MOVE, ie., walking and MUSCLE, ie.,calisthenics, bands, weight, machines, etc...  _____________________________________________________________________  Remember that after your bariatric surgery, vitamin supplementation is a lifelong need.  You will take:    B-12 1000mcg or higher sublingual (under the tongue) daily or by injection 1-2X /month  Vitamin D3 5000U daily   Multivitamin containing 18mg of iron twice a day  Calcium citrate 1 or 2 daily  Thiamine 100 mg weekly   Vitron C once daily if you are a menstruating female  To keep your weight off and your vitamin levels up, follow-up is important.  Your labs will be monitored every 3 months for the first year  and yearly thereafter.  To avoid ulcers in your stomach avoid tobacco forever, alcohol in excess, caffeine in excess and anti-inflammatories (NSAIDS)  (Aspirin, Ibuprofen, Naproxen and similar medications). Tylenol is fine.  If you are told by your physician take Aspirin to protect your heart or for another reason, make sure to take omeprazole or similar medication (protonix, nexium, prevacid) to protect your stomach.  Remember that alcohol affects you differently after bariatric surgery. If you have even ONE drink DO NOT DRIVE.

## 2021-09-03 NOTE — PROGRESS NOTES
"Zelda is a 26 year old who is being evaluated via a billable video visit.      If the video visit is dropped, the invitation should be resent by: Text to cell phone: [phone]  Will anyone else be joining your video visit? No      Video-Visit Details    Type of service:  Video Visit    Video Start Time: 11:05 AM    Video End Time: 11:55 AM    Originating Location (pt. Location): Home    Distant Location (provider location):  [facility] [place]     Platform used for Video Visit: [facility] Bariatric Surgery Consultation Note    September 3, 2021    RE: Zelda Paulson  MR#: [id number]  : 1995      Referring provider: No flowsheet data found.    Chief Complaint/Reason for visit: evaluation for possible weight loss surgery    Dear Jasmin Alonso (General),    I had the pleasure of seeing your patient, Zelda Paulson, to evaluate her obesity and consider her for possible weight loss surgery. As you know, Zelda Paulson is 26 year old.  She has a height of 5' 1\"[pt reported[, a weight of 212 lbs 11.2 oz, and calculated Body mass index is 40.19 kg/m .     Assessment & Plan   Problem List Items Addressed This Visit     PCOS (polycystic ovarian syndrome)    Iron deficiency    Mild intermittent asthma without complication      Other Visit Diagnoses     Morbid obesity (H)    -  Primary           70 minutes spent on the date of the encounter doing chart review, history and exam, review test results, counseling, developing plan of care, documentation, and further activities as noted above.       HISTORY OF PRESENT ILLNESS:  Weight Loss History Reviewed with Patient 2021   How long have you been overweight? Since late teens through early 20's   What is the most that you have ever weighed? 236   What is the most weight you have lost? 15   I have tried the following methods to lose weight Watching portions or calories, Exercise, OTC Medications   I have tried the following " weight loss medications? (Check all that apply) None   Have you ever had weight loss surgery? No       CO-MORBIDITIES OF OBESITY INCLUDE:     2021   I have the following health issues associated with obesity: Polycystic Ovarian Syndrome, Infertility, Asthma, Hypothyroidism     Pt has been interested in weight loss surgery for past 5 years.  Pt is 2 months post partum.  Currently breastfeeding. 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 Butler and Courtney.  Pt states she talked with Dr. Nunez, bariatric surgeon at [place], who felt pt was able to start surgical process 6 weeks postpartum with ok from OB/GYN.  Thus why she is here today.      PAST MEDICAL HISTORY:  Past Medical History:   Diagnosis Date     Chronic kidney disease     kidney stones     Thyroid disease      Uncomplicated asthma      PAST SURGICAL HISTORY: No history of bleeding, clotting, malignant hyperthermia, or other anesthesia problems with surgery. Denies PONV.  Past Surgical History:   Procedure Laterality Date     APPENDECTOMY       BREAST SURGERY      breast lumpectomy      SECTION N/A 2018    Procedure:  SECTION;;  Surgeon: Lela Looney MD;  Location:  [facility]      SECTION  2018      SECTION N/A 2021    Procedure: REPEAT  SECTION;  Surgeon: Angie Rankin MD;  Location:  [facility]     ENT SURGERY      tonsillectomy     LAPAROSCOPIC CHOLECYSTECTOMY WITH CHOLANGIOGRAMS N/A 2019    Procedure: LAPAROSCOPIC CHOLECYSTECTOMY WITH POSSIBLE INTRAOPERATIVE CHOLANGIOGRAMS;  Surgeon: Valentín Butler MD;  Location:  OR       FAMILY HISTORY:   Family History   Problem Relation Age of Onset     Anxiety Disorder Mother      Depression Mother      Depression Brother      Anxiety Disorder Brother        SOCIAL HISTORY:   Social History Questions Reviewed With Patient 2021   Which best  describes your employment status (select all that apply) I work full-time, I work days   If you work, what is your occupation? [employer]   Which best describes your marital status: - Justin   Do you have children? Yes 2 3, daughter 2 months on 7th- is breastfeeding daughter.      Who do you have in your support network that can be available to help you for the first 2 weeks after surgery? Mother,  and brother   Who can you count on for support throughout your weight loss surgery journey?  friends and parents   Can you afford 3 meals a day?  Yes   Can you afford 50-60 dollars a month for vitamins? Yes       HABITS:     9/2/2021   How often do you drink alcohol? Monthly or less   If you do drink alcohol, how many drinks might you have in a day? (one drink = 5 oz. wine, 1 can/bottle of beer, 1 shot liquor) 1 or 2   Have you ever used any of the following nicotine products? No   Have you or are you currently using street drugs or prescription strength medication for which you do not have a prescription for? No   Do you have a history of chemical dependency (alcohol or drug abuse)? No       PSYCHOLOGICAL HISTORY:   Psychological History Reviewed With Patient 9/2/2021   Have you ever attempted suicide? Never.   Have you had thoughts of suicide in the past year? No   Have you ever been hospitalized for mental illness or a suicide attempt? Never.   Do you have a history of chronic pain? No   Have you ever been diagnosed with fibromyalgia? No   Are you currently seeing a therapist or counselor?  No   Are you currently seeing a psychiatrist? No       ROS:     9/2/2021   Skin:  None of the above   HEENT: Headaches   Musculoskeletal: Back pain   Cardiovascular: Shortness of breath with activity   Pulmonary: Shortness of breath with activity   Gastrointestinal: None of the above   Genitourinary: Kidney stones   Hematological: None of the above   Neurological: Migraine  headaches- once a week, has headaches daily.     Female only: Loss of menstrual cycles, Irregular menstrual cycles, Polycystic ovarian syndrome (PCOS)       EATING BEHAVIORS:     9/2/2021   Have you or anyone else thought that you had an eating disorder? No   Do you currently binge eat (eat a large amount of food in a short time)? No   Are you an emotional eater? No   Do you get up to eat after falling asleep? No       EXERCISE:     9/2/2021   How often do you exercise? Less than 1 time per week   What is the duration of your exercise (in minutes)? 20 Minutes   What types of exercise do you do? walking   What keeps you from being more active?  I should be more active but I just have not gotten around to it, Too tired       MEDICATIONS:  Current Outpatient Medications   Medication     LEVOTHYROXINE SODIUM PO     No current facility-administered medications for this visit.        ALLERGIES:  Allergies   Allergen Reactions     Fentanyl Anaphylaxis     itching     Morphine Anaphylaxis     Sulfa Drugs Rash           LABS AND RECORDS REVIEWED:  Hemoglobin A1C   Date Value Ref Range Status   07/14/2017 5.3 4.3 - 6.0 % Final     Vitamin D Deficiency screening   Date Value Ref Range Status   07/14/2017 24 20 - 75 ug/L Final     Comment:     Season, race, dietary intake, and treatment affect the concentration of   25-hydroxy-Vitamin D. Values may decrease during winter months and increase   during summer months. Values 20-29 ug/L may indicate Vitamin D insufficiency   and values <20 ug/L may indicate Vitamin D deficiency.   Vitamin D determination is routinely performed by an immunoassay specific for   25 hydroxyvitamin D3.  If an individual is on vitamin D2 (ergocalciferol)   supplementation, please specify 25 OH vitamin D2 and D3 level determination   by   LCMSMS test [id number].       Sodium   Date Value Ref Range Status   01/06/2019 141 133 - 144 mmol/L Final     Potassium   Date Value Ref Range Status   01/06/2019 3.5 3.4 -  "5.3 mmol/L Final     Chloride   Date Value Ref Range Status   01/06/2019 107 94 - 109 mmol/L Final     Carbon Dioxide   Date Value Ref Range Status   01/06/2019 27 20 - 32 mmol/L Final     Anion Gap   Date Value Ref Range Status   01/06/2019 7 3 - 14 mmol/L Final     Glucose   Date Value Ref Range Status   01/06/2019 75 70 - 99 mg/dL Final     Urea Nitrogen   Date Value Ref Range Status   01/06/2019 12 7 - 30 mg/dL Final     Creatinine   Date Value Ref Range Status   01/06/2019 0.83 0.52 - 1.04 mg/dL Final     GFR Estimate   Date Value Ref Range Status   01/06/2019 >90 >60 mL/min/[1.73_m2] Final     Comment:     Non  GFR Calc  Starting 12/18/2018, serum creatinine based estimated GFR (eGFR) will be   calculated using the Chronic Kidney Disease Epidemiology Collaboration   (CKD-EPI) equation.       Calcium   Date Value Ref Range Status   01/06/2019 8.4 (L) 8.5 - 10.1 mg/dL Final     Bilirubin Total   Date Value Ref Range Status   01/06/2019 0.3 0.2 - 1.3 mg/dL Final     Alkaline Phosphatase   Date Value Ref Range Status   01/06/2019 146 40 - 150 U/L Final     ALT   Date Value Ref Range Status   01/06/2019 496 (H) 0 - 50 U/L Final     AST   Date Value Ref Range Status   01/06/2019 303 (H) 0 - 45 U/L Final     WBC   Date Value Ref Range Status   04/10/2021 8.1 4.0 - 11.0 10e9/L Final     Hemoglobin   Date Value Ref Range Status   07/08/2021 8.2 (L) 11.7 - 15.7 g/dL Final     Hematocrit   Date Value Ref Range Status   04/10/2021 31.6 (L) 35.0 - 47.0 % Final     MCV   Date Value Ref Range Status   04/10/2021 88 78 - 100 fl Final     Platelet Count   Date Value Ref Range Status   04/10/2021 213 150 - 450 10e9/L Final         PHYSICAL EXAM:  /88   Ht 5' 1\" (1.549 m)   Wt 212 lb 11.2 oz (96.5 kg)   BMI 40.19 kg/m    GENERAL: Healthy, alert and no distress  EYES: Eyes grossly normal to inspection.  No discharge or erythema, or obvious scleral/conjunctival abnormalities.  RESP: No audible wheeze, " cough, or visible cyanosis.  No visible retractions or increased work of breathing.    SKIN: Visible skin clear. No significant rash, abnormal pigmentation or lesions.  NEURO: Cranial nerves grossly intact.  Mentation and speech appropriate for age.  PSYCH: Mentation appears normal, affect normal/bright, judgement and insight intact, normal speech and appearance well-groomed.      In summary, Zelda Paulson has Class III obesity with a body mass index of Body mass index is 40.19 kg/m . kg/m2 and the comorbidities stated above. She completed an informational seminar and is a possible candidate for the bariatric surgery.  Per [facility] Comprehensive Weight Loss Program Protocol Patients: may have surgery 12 months after delivery and may start program dietary requirements 6 months following pregnancy. In Zelda's case we are going to modify this slightly.  She will start preparation for surgery slowly during 4th quarter.  In January, pt will proceed with full bariatric surgery prep.      The following is her modified plan:     Pt will see dietician for initial evaluation next week.     Will schedule psych eval with [facility] which is booking out into January.     Draw initial pre-op labs now.     Will have follow up diet visit in 3 months      Will have 1 hour pre surgery in person visit with Nida Fortune PA-C 3 months.    Patient verbalizes understanding of her modified process.  All questions were answered.      Sincerely,     Nida Fortune PA-C

## 2021-09-07 ENCOUNTER — MEDICAL CORRESPONDENCE (OUTPATIENT)
Dept: HEALTH INFORMATION MANAGEMENT | Facility: CLINIC | Age: 26
End: 2021-09-07

## 2021-09-28 ENCOUNTER — OFFICE VISIT (OUTPATIENT)
Dept: FAMILY MEDICINE | Facility: OTHER | Age: 26
End: 2021-09-28
Payer: COMMERCIAL

## 2021-09-28 VITALS
HEIGHT: 62 IN | DIASTOLIC BLOOD PRESSURE: 80 MMHG | BODY MASS INDEX: 38.83 KG/M2 | SYSTOLIC BLOOD PRESSURE: 136 MMHG | OXYGEN SATURATION: 98 % | WEIGHT: 211 LBS | HEART RATE: 83 BPM | RESPIRATION RATE: 14 BRPM | TEMPERATURE: 96.6 F

## 2021-09-28 DIAGNOSIS — I10 HYPERTENSION, UNSPECIFIED TYPE: Primary | ICD-10-CM

## 2021-09-28 PROCEDURE — 99213 OFFICE O/P EST LOW 20 MIN: CPT | Performed by: PHYSICIAN ASSISTANT

## 2021-09-28 RX ORDER — LABETALOL 200 MG/1
TABLET, FILM COATED ORAL
COMMUNITY
End: 2021-09-28 | Stop reason: SINTOL

## 2021-09-28 RX ORDER — METOPROLOL SUCCINATE 25 MG/1
25 TABLET, EXTENDED RELEASE ORAL DAILY
Qty: 30 TABLET | Refills: 1 | Status: SHIPPED | OUTPATIENT
Start: 2021-09-28 | End: 2021-10-20

## 2021-09-28 RX ORDER — LEVOTHYROXINE SODIUM 137 UG/1
TABLET ORAL
COMMUNITY
End: 2021-10-21

## 2021-09-28 RX ORDER — ALBUTEROL SULFATE 90 UG/1
1 AEROSOL, METERED RESPIRATORY (INHALATION)
COMMUNITY
End: 2023-12-13

## 2021-09-28 ASSESSMENT — ENCOUNTER SYMPTOMS
HEMATURIA: 0
HEMATOCHEZIA: 0
NAUSEA: 0
WEAKNESS: 0
HEARTBURN: 0
MYALGIAS: 0
PALPITATIONS: 0
SHORTNESS OF BREATH: 0
ABDOMINAL PAIN: 0
ARTHRALGIAS: 0
DIZZINESS: 0
DYSURIA: 0
PARESTHESIAS: 0
SORE THROAT: 0
JOINT SWELLING: 0
EYE PAIN: 0
CONSTIPATION: 0
FEVER: 0
DIARRHEA: 0
COUGH: 0
HEADACHES: 1
CHILLS: 0
FREQUENCY: 0
NERVOUS/ANXIOUS: 0

## 2021-09-28 ASSESSMENT — ASTHMA QUESTIONNAIRES

## 2021-09-28 ASSESSMENT — PAIN SCALES - GENERAL: PAINLEVEL: NO PAIN (0)

## 2021-09-28 ASSESSMENT — MIFFLIN-ST. JEOR: SCORE: 1655.47

## 2021-09-28 NOTE — PROGRESS NOTES
"    Assessment & Plan     Hypertension, unspecified type  Onset at 6 weeks postpartum, will discontinue labetalol because of its side effects and switch her over to metoprolol, she will contact me within a week and consider increasing dosage to 50 mg if not fully controlled on 25 mg a day.  She is nursing, according to up-to-date, metoprolol labetalol and propranolol are most typical for use in nursing mothers.  - metoprolol succinate ER (TOPROL-XL) 25 MG 24 hr tablet; Take 1 tablet (25 mg) by mouth daily      Recheck with myself if uncontrolled in 2 weeks or with float nurse in 2 weeks, sooner as needed       BMI:   Estimated body mass index is 38.19 kg/m  as calculated from the following:    Height as of this encounter: 1.583 m (5' 2.32\").    Weight as of this encounter: 95.7 kg (211 lb).   Weight management plan: Patient referred to endocrine and/or weight management specialty  Patient has already had her first visit with bariatric surgery     Return in about 2 weeks (around 10/12/2021) for BP Recheck.    Patricia Berg PA-C  [facility] DEBBIE Ndiaye is a 26 year old who presents for the following health issues     Healthy Habits:     Getting at least 3 servings of Calcium per day:  NO    Bi-annual eye exam:  Yes    Dental care twice a year:  Yes    Sleep apnea or symptoms of sleep apnea:  None    Diet:  Regular (no restrictions)    Frequency of exercise:  None    Taking medications regularly:  No    Medication side effects:  Other    PHQ-2 Total Score: 0    Additional concerns today:  Yes       Hypertension Follow-up      Do you check your blood pressure regularly outside of the clinic? Yes     Are you following a low salt diet? No    Are your blood pressures ever more than 140 on the top number (systolic) OR more   than 90 on the bottom number (diastolic), for example 140/90? Yes, typically running 150/90      Pt presents to establish care and discuss high b/ps since 6 wks " postpartum.  She denies having any hypertensive issues with her first pregnancy.  She had no hypertensive issues with her second pregnancy either not even in the first 1 to 2 days postpartum.  At her 6-week postpartum check her blood pressure was found to be in the 150s over 90s.  She was started on labetalol at 200 mg twice a day.  She had a recheck on September 16 and her blood pressure was unchanged at 150/90.  September 20 blood pressure was still elevated at 130/90.  She does not like labetalol it makes her feel very jittery tingly makes her feel like she has goosebumps on her head for the first hour after she takes it.  She has been getting some headaches that typically will self resolve if they do not she will take Tylenol and that does resolve them.  She has had no vision disturbances, speech difficulties, unilateral weakness chest pains or shortness of breath.  She denies any pedal edema.  She is hypothyroid.  Her TSH was checked by her OB approximately 1 month ago and it was normal.  They also checked her kidney function which was also normal.    Review of Systems   Constitutional: Negative for chills and fever.   HENT: Negative for congestion, ear pain, hearing loss and sore throat.    Eyes: Negative for pain and visual disturbance.   Respiratory: Negative for cough and shortness of breath.    Cardiovascular: Negative for chest pain, palpitations and peripheral edema.   Gastrointestinal: Negative for abdominal pain, constipation, diarrhea, heartburn, hematochezia and nausea.   Genitourinary: Negative for dysuria, frequency, genital sores, hematuria and urgency.   Musculoskeletal: Negative for arthralgias, joint swelling and myalgias.   Skin: Negative for rash.   Neurological: Positive for headaches. Negative for dizziness, weakness and paresthesias.   Psychiatric/Behavioral: Negative for mood changes. The patient is not nervous/anxious.             Objective    /80   Pulse 83   Temp (!) 96.6  F  "(35.9  C) (Temporal)   Resp 14   Ht 1.583 m (5' 2.32\")   Wt 95.7 kg (211 lb)   SpO2 98%   BMI 38.19 kg/m    Body mass index is 38.19 kg/m .  Physical Exam   GENERAL: healthy, alert and no distress  NECK: no adenopathy, no asymmetry, masses, or scars and thyroid normal to palpation  RESP: lungs clear to auscultation - no rales, rhonchi or wheezes  CV: regular rate and rhythm, normal S1 S2, no S3 or S4, no murmur, click or rub, no peripheral edema and peripheral pulses strong  ABDOMEN: soft, nontender, no hepatosplenomegaly, no masses and bowel sounds normal  MS: no gross musculoskeletal defects noted, no edema  PSYCH: mentation appears normal, affect normal/bright    Admission on 07/07/2021, Discharged on 07/09/2021   Component Date Value Ref Range Status     ABO 07/07/2021 A   Final     RH(D) 07/07/2021 Pos   Final     Antibody Screen 07/07/2021 Neg   Final     Test Valid Only At 07/07/2021 [facility]      Final     Specimen Expires 07/07/2021 07/10/2021   Final     Hemoglobin 07/07/2021 10.2* 11.7 - 15.7 g/dL Final     Treponema Antibodies 07/07/2021 Nonreactive  NR^Nonreactive Final    Comment: Methodology Change: Test performed on the [employer] Liaison XL by Treponema   pallidum Total Antibodies Assay as of 3.17.2020.       Group B Strep PCR 06/15/2021 Negative   Final     Hemoglobin 07/08/2021 8.2* 11.7 - 15.7 g/dL Final     Results that are more recent reviewed in care everywhere          "

## 2021-09-29 ASSESSMENT — ASTHMA QUESTIONNAIRES: ACT_TOTALSCORE: 25

## 2021-09-30 ENCOUNTER — MYC MEDICAL ADVICE (OUTPATIENT)
Dept: FAMILY MEDICINE | Facility: OTHER | Age: 26
End: 2021-09-30

## 2021-10-09 ENCOUNTER — HEALTH MAINTENANCE LETTER (OUTPATIENT)
Age: 26
End: 2021-10-09

## 2021-10-19 NOTE — PROGRESS NOTES
Assessment & Plan     Hypertension, unspecified type  Doing well, will continue current med for at least 3 more months, she will continue to monitor P and BP and let me know if it is getting too low, we may need to taper off  - metoprolol succinate ER (TOPROL-XL) 25 MG 24 hr tablet; Take 1 tablet (25 mg) by mouth daily    Hypothyroidism, unspecified type    Await tsh titrate dosage prn  Will recheck to ensure she is euthyroid still  - TSH with free T4 reflex; Future  - TSH with free T4 reflex        Morbid obesity (H)    - Vitamin D Deficiency  - Hemoglobin A1c  - Comprehensive metabolic panel  - CBC with platelets    Screening for endocrine, nutritional, metabolic and immunity disorder    - Vitamin D Deficiency    PCOS (polycystic ovarian syndrome)    - Hemoglobin A1c  - Comprehensive metabolic panel    Iron deficiency    - CBC with platelets                 Return in about 6 months (around 4/20/2022) for BP Recheck.    Patricia Berg PA-C  [facility] DEBBIE Ndiaye is a 26 year old who presents for the following health issues  accompanied by her spouse.    History of Present Illness       Hypertension: She presents for follow up of hypertension.  She does check blood pressure  regularly outside of the clinic. Outside blood pressures have been over 140/90. She follows a low salt diet.     She eats 0-1 servings of fruits and vegetables daily.She consumes 3 sweetened beverage(s) daily.She exercises with enough effort to increase her heart rate 10 to 19 minutes per day.  She exercises with enough effort to increase her heart rate 3 or less days per week. She is missing 1 dose(s) of medications per week.     She is feeling back to normal once again she has adjusted to the metoprolol, feeling so much better off labetolol.   No more HEADACHE or hair tingling .  BP have normalized.  She is back at work    Review of Systems   As documented above       Objective    /60   Pulse 61    Temp 97.4  F (36.3  C) (Temporal)   Resp 16   Wt 95.7 kg (211 lb)   SpO2 98%   BMI 38.19 kg/m    Body mass index is 38.19 kg/m .  Physical Exam   GENERAL: healthy, alert and no distress  NECK: no adenopathy, no asymmetry, masses, or scars and thyroid normal to palpation  RESP: lungs clear to auscultation - no rales, rhonchi or wheezes  CV: regular rate and rhythm, normal S1 S2, no S3 or S4, no murmur, click or rub, no peripheral edema and peripheral pulses strong  MS: no gross musculoskeletal defects noted, no edema  PSYCH: mentation appears normal, affect normal/bright

## 2021-10-20 ENCOUNTER — OFFICE VISIT (OUTPATIENT)
Dept: FAMILY MEDICINE | Facility: OTHER | Age: 26
End: 2021-10-20
Payer: COMMERCIAL

## 2021-10-20 VITALS
WEIGHT: 211 LBS | BODY MASS INDEX: 38.19 KG/M2 | SYSTOLIC BLOOD PRESSURE: 122 MMHG | TEMPERATURE: 97.4 F | RESPIRATION RATE: 16 BRPM | OXYGEN SATURATION: 98 % | DIASTOLIC BLOOD PRESSURE: 60 MMHG | HEART RATE: 61 BPM

## 2021-10-20 DIAGNOSIS — E61.1 IRON DEFICIENCY: ICD-10-CM

## 2021-10-20 DIAGNOSIS — Z13.29 SCREENING FOR ENDOCRINE, NUTRITIONAL, METABOLIC AND IMMUNITY DISORDER: ICD-10-CM

## 2021-10-20 DIAGNOSIS — I10 HYPERTENSION, UNSPECIFIED TYPE: Primary | ICD-10-CM

## 2021-10-20 DIAGNOSIS — Z13.21 SCREENING FOR ENDOCRINE, NUTRITIONAL, METABOLIC AND IMMUNITY DISORDER: ICD-10-CM

## 2021-10-20 DIAGNOSIS — E03.9 HYPOTHYROIDISM, UNSPECIFIED TYPE: ICD-10-CM

## 2021-10-20 DIAGNOSIS — Z13.228 SCREENING FOR ENDOCRINE, NUTRITIONAL, METABOLIC AND IMMUNITY DISORDER: ICD-10-CM

## 2021-10-20 DIAGNOSIS — E66.01 MORBID OBESITY (H): ICD-10-CM

## 2021-10-20 DIAGNOSIS — Z13.0 SCREENING FOR ENDOCRINE, NUTRITIONAL, METABOLIC AND IMMUNITY DISORDER: ICD-10-CM

## 2021-10-20 DIAGNOSIS — E28.2 PCOS (POLYCYSTIC OVARIAN SYNDROME): ICD-10-CM

## 2021-10-20 LAB
ALBUMIN SERPL-MCNC: 3.7 G/DL (ref 3.4–5)
ALP SERPL-CCNC: 77 U/L (ref 40–150)
ALT SERPL W P-5'-P-CCNC: 26 U/L (ref 0–50)
ANION GAP SERPL CALCULATED.3IONS-SCNC: 3 MMOL/L (ref 3–14)
AST SERPL W P-5'-P-CCNC: 14 U/L (ref 0–45)
BILIRUB SERPL-MCNC: 0.4 MG/DL (ref 0.2–1.3)
BUN SERPL-MCNC: 13 MG/DL (ref 7–30)
CALCIUM SERPL-MCNC: 8 MG/DL (ref 8.5–10.1)
CHLORIDE BLD-SCNC: 108 MMOL/L (ref 94–109)
CO2 SERPL-SCNC: 28 MMOL/L (ref 20–32)
CREAT SERPL-MCNC: 0.76 MG/DL (ref 0.52–1.04)
ERYTHROCYTE [DISTWIDTH] IN BLOOD BY AUTOMATED COUNT: 15.7 % (ref 10–15)
GFR SERPL CREATININE-BSD FRML MDRD: >90 ML/MIN/1.73M2
GLUCOSE BLD-MCNC: 71 MG/DL (ref 70–99)
HBA1C MFR BLD: 5.2 % (ref 0–5.6)
HCT VFR BLD AUTO: 38 % (ref 35–47)
HGB BLD-MCNC: 11.8 G/DL (ref 11.7–15.7)
MCH RBC QN AUTO: 26.1 PG (ref 26.5–33)
MCHC RBC AUTO-ENTMCNC: 31.1 G/DL (ref 31.5–36.5)
MCV RBC AUTO: 84 FL (ref 78–100)
PLATELET # BLD AUTO: 255 10E3/UL (ref 150–450)
POTASSIUM BLD-SCNC: 3.8 MMOL/L (ref 3.4–5.3)
PROT SERPL-MCNC: 7.4 G/DL (ref 6.8–8.8)
RBC # BLD AUTO: 4.52 10E6/UL (ref 3.8–5.2)
SODIUM SERPL-SCNC: 139 MMOL/L (ref 133–144)
T4 FREE SERPL-MCNC: 0.92 NG/DL (ref 0.76–1.46)
TSH SERPL DL<=0.005 MIU/L-ACNC: 5.82 MU/L (ref 0.4–4)
WBC # BLD AUTO: 6.9 10E3/UL (ref 4–11)

## 2021-10-20 PROCEDURE — 99213 OFFICE O/P EST LOW 20 MIN: CPT | Performed by: PHYSICIAN ASSISTANT

## 2021-10-20 PROCEDURE — 85027 COMPLETE CBC AUTOMATED: CPT | Performed by: PHYSICIAN ASSISTANT

## 2021-10-20 PROCEDURE — 84439 ASSAY OF FREE THYROXINE: CPT | Performed by: PHYSICIAN ASSISTANT

## 2021-10-20 PROCEDURE — 83036 HEMOGLOBIN GLYCOSYLATED A1C: CPT | Performed by: PHYSICIAN ASSISTANT

## 2021-10-20 PROCEDURE — 84443 ASSAY THYROID STIM HORMONE: CPT | Performed by: PHYSICIAN ASSISTANT

## 2021-10-20 PROCEDURE — 82306 VITAMIN D 25 HYDROXY: CPT | Performed by: PHYSICIAN ASSISTANT

## 2021-10-20 PROCEDURE — 80053 COMPREHEN METABOLIC PANEL: CPT | Performed by: PHYSICIAN ASSISTANT

## 2021-10-20 PROCEDURE — 36415 COLL VENOUS BLD VENIPUNCTURE: CPT | Performed by: PHYSICIAN ASSISTANT

## 2021-10-20 RX ORDER — METOPROLOL SUCCINATE 25 MG/1
25 TABLET, EXTENDED RELEASE ORAL DAILY
Qty: 90 TABLET | Refills: 1 | Status: SHIPPED | OUTPATIENT
Start: 2021-10-20 | End: 2022-04-04

## 2021-10-20 ASSESSMENT — PAIN SCALES - GENERAL: PAINLEVEL: NO PAIN (0)

## 2021-10-21 ENCOUNTER — MYC MEDICAL ADVICE (OUTPATIENT)
Dept: FAMILY MEDICINE | Facility: OTHER | Age: 26
End: 2021-10-21

## 2021-10-21 DIAGNOSIS — E03.9 HYPOTHYROIDISM, UNSPECIFIED TYPE: Primary | ICD-10-CM

## 2021-10-21 LAB — DEPRECATED CALCIDIOL+CALCIFEROL SERPL-MC: 27 UG/L (ref 20–75)

## 2021-10-21 RX ORDER — LEVOTHYROXINE SODIUM 150 UG/1
150 TABLET ORAL DAILY
Qty: 30 TABLET | Refills: 1 | Status: SHIPPED | OUTPATIENT
Start: 2021-10-21 | End: 2022-04-04

## 2021-11-09 ENCOUNTER — MEDICAL CORRESPONDENCE (OUTPATIENT)
Dept: HEALTH INFORMATION MANAGEMENT | Facility: CLINIC | Age: 26
End: 2021-11-09
Payer: COMMERCIAL

## 2022-02-01 ENCOUNTER — MEDICAL CORRESPONDENCE (OUTPATIENT)
Dept: HEALTH INFORMATION MANAGEMENT | Facility: CLINIC | Age: 27
End: 2022-02-01
Payer: COMMERCIAL

## 2022-04-04 ENCOUNTER — OFFICE VISIT (OUTPATIENT)
Dept: FAMILY MEDICINE | Facility: OTHER | Age: 27
End: 2022-04-04
Payer: COMMERCIAL

## 2022-04-04 VITALS
RESPIRATION RATE: 16 BRPM | WEIGHT: 220.6 LBS | BODY MASS INDEX: 39.09 KG/M2 | TEMPERATURE: 97.7 F | OXYGEN SATURATION: 99 % | HEIGHT: 63 IN | DIASTOLIC BLOOD PRESSURE: 70 MMHG | SYSTOLIC BLOOD PRESSURE: 102 MMHG | HEART RATE: 69 BPM

## 2022-04-04 DIAGNOSIS — E66.812 CLASS 2 OBESITY WITH BODY MASS INDEX (BMI) OF 39.0 TO 39.9 IN ADULT, UNSPECIFIED OBESITY TYPE, UNSPECIFIED WHETHER SERIOUS COMORBIDITY PRESENT: Primary | ICD-10-CM

## 2022-04-04 DIAGNOSIS — Z12.4 CERVICAL CANCER SCREENING: ICD-10-CM

## 2022-04-04 DIAGNOSIS — Z11.59 NEED FOR HEPATITIS C SCREENING TEST: ICD-10-CM

## 2022-04-04 DIAGNOSIS — E03.9 HYPOTHYROIDISM, UNSPECIFIED TYPE: ICD-10-CM

## 2022-04-04 DIAGNOSIS — E66.01 MORBID OBESITY (H): ICD-10-CM

## 2022-04-04 DIAGNOSIS — M54.9 UPPER BACK PAIN: ICD-10-CM

## 2022-04-04 DIAGNOSIS — I10 HYPERTENSION, UNSPECIFIED TYPE: ICD-10-CM

## 2022-04-04 DIAGNOSIS — N62 LARGE BREASTS: ICD-10-CM

## 2022-04-04 DIAGNOSIS — M54.2 NECK PAIN: ICD-10-CM

## 2022-04-04 DIAGNOSIS — B37.2 CANDIDIASIS OF SKIN: ICD-10-CM

## 2022-04-04 LAB
T4 FREE SERPL-MCNC: 0.82 NG/DL (ref 0.76–1.46)
TSH SERPL DL<=0.005 MIU/L-ACNC: 4.65 MU/L (ref 0.4–4)

## 2022-04-04 PROCEDURE — 36415 COLL VENOUS BLD VENIPUNCTURE: CPT | Performed by: PHYSICIAN ASSISTANT

## 2022-04-04 PROCEDURE — 84439 ASSAY OF FREE THYROXINE: CPT | Performed by: PHYSICIAN ASSISTANT

## 2022-04-04 PROCEDURE — 84443 ASSAY THYROID STIM HORMONE: CPT | Performed by: PHYSICIAN ASSISTANT

## 2022-04-04 PROCEDURE — 99214 OFFICE O/P EST MOD 30 MIN: CPT | Performed by: PHYSICIAN ASSISTANT

## 2022-04-04 RX ORDER — METOPROLOL SUCCINATE 25 MG/1
25 TABLET, EXTENDED RELEASE ORAL DAILY
Qty: 90 TABLET | Refills: 1 | Status: CANCELLED | OUTPATIENT
Start: 2022-04-04

## 2022-04-04 RX ORDER — LEVOTHYROXINE SODIUM 150 UG/1
150 TABLET ORAL DAILY
Qty: 30 TABLET | Refills: 1 | Status: SHIPPED | OUTPATIENT
Start: 2022-04-04 | End: 2022-04-05 | Stop reason: DRUGHIGH

## 2022-04-04 RX ORDER — NALTREXONE HYDROCHLORIDE AND BUPROPION HYDROCHLORIDE 8; 90 MG/1; MG/1
1 TABLET, EXTENDED RELEASE ORAL 2 TIMES DAILY
Qty: 60 TABLET | Refills: 1 | Status: SHIPPED | OUTPATIENT
Start: 2022-04-04 | End: 2022-04-06

## 2022-04-04 NOTE — PROGRESS NOTES
"  Assessment & Plan     Hypertension, unspecified type  Started postpartum, now seems to be resolved and she is off meds, she will check her blood pressures to ensure we do not need to restart her meds    Hypothyroidism, unspecified type  Await TSH titrate dosage as needed  - levothyroxine (SYNTHROID/LEVOTHROID) 150 MCG tablet; Take 1 tablet (150 mcg) by mouth daily  - TSH with free T4 reflex; Future  - TSH with free T4 reflex        Class 2 obesity with body mass index (BMI) of 39.0 to 39.9 in adult, unspecified obesity type, unspecified whether serious comorbidity present  Discussed Contrave, I am not sure if it will be covered or not.  One of the precautions is uncontrolled hypertension which she does not have though I will have her monitor her blood pressure in the event it elevates will have to have her come off this med.  I am not comfortable prescribing phentermine patient understands.  We talked about the healthiest way of eating is a diet rich in vegetables and lean protein, she will start to track her food in an [employer] like [employer] pal  - naltrexone-bupropion (CONTRAVE) 8-90 MG per 12 hr tablet; Take 1 tablet by mouth 2 times daily  - TSH with free T4 reflex; Future  - TSH with free T4 reflex    Large breasts  We will refer to plastic surgery  - Plastic Surgery Referral; Future    Upper back pain  Patient will check into insurance coverage if this is not covered we can  refer through [facility]  - Plastic Surgery Referral; Future    Neck pain    - Plastic Surgery Referral; Future    Candidiasis of skin    - Plastic Surgery Referral; Future    Morbid obesity (H)  Contrave as above               BMI:   Estimated body mass index is 39.08 kg/m  as calculated from the following:    Height as of this encounter: 1.6 m (5' 3\").    Weight as of this encounter: 100.1 kg (220 lb 9.6 oz).   Weight management plan: Discussed healthy diet and exercise guidelines        No follow-ups on file.    MAKSIM Limon " [facility] STAR Ndiaye is a 26 year old who presents for the following health issues     History of Present Illness       Hypertension: She presents for follow up of hypertension.  She does not check blood pressure  regularly outside of the clinic. Outpatient blood pressures have not been over 140/90. She does not follow a low salt diet.     Reason for visit:  Follow up but also discuss weight issues  Symptom onset:  More than a month  Symptoms include:  High bp and thyroid  Symptom intensity:  Moderate  Symptom progression:  Staying the same  Had these symptoms before:  Yes  Has tried/received treatment for these symptoms:  Yes  Previous treatment was successful:  Yes  Prior treatment description:  Meds    She eats 0-1 servings of fruits and vegetables daily.She consumes 3 sweetened beverage(s) daily.She exercises with enough effort to increase her heart rate 10 to 19 minutes per day.  She exercises with enough effort to increase her heart rate 4 days per week. She is missing 1 dose(s) of medications per week.  She is not taking prescribed medications regularly due to remembering to take.     She has been out of her metoprolol for about 1 month.  10 weeks postpartum she was started on labetalol for hypertension.  Due to side effects we switched her over to metoprolol and she has done well on this.  She ran out about a month ago and continue to monitor her blood pressures, they have been 130/70 or less off of meds.    She has hypothyroidism we increased her levothyroxine to 150 mcg in October.  She is needing refills today and also needs to have her levels rechecked once again.    She was nearly through the process to have a gastric sleeve when she became pregnant she restarted this process once again after her first pregnancy she was having difficulty becoming pregnant and opted to have surgery however she once again became pregnant during this process.  Unfortunately, now her  "insurance will not cover it.  One of her friends was on phentermine which has helped her lose weight she wonders if she is a candidate for anything like that.  She eats smaller portions.  She tried intermittent fasting but got hypoglycemic and had to stop.  She is no longer nursing she stopped nursing in January.  She never had a seizure or eating disorder    She wonders about having a breast reduction she has had a lifelong struggle since her mid teens with upper back and neck pain due to her large breasts.  She gets grooves in the shoulders towards the end of the day from her bra straps and the skin between her breasts gets a red and also under her breasts  gets red and sweaty and sometimes itchy.      Review of Systems   As documented above       Objective    /70   Pulse 69   Temp 97.7  F (36.5  C) (Temporal)   Resp 16   Ht 1.6 m (5' 3\")   Wt 100.1 kg (220 lb 9.6 oz)   SpO2 99%   BMI 39.08 kg/m    Body mass index is 39.08 kg/m .  Physical Exam   GENERAL: healthy, alert and no distress  NECK: no adenopathy, no asymmetry, masses, or scars and thyroid normal to palpation  RESP: lungs clear to auscultation - no rales, rhonchi or wheezes  CV: regular rate and rhythm, normal S1 S2, no S3 or S4, no murmur, click or rub, no peripheral edema and peripheral pulses strong  ABDOMEN: soft, nontender, no hepatosplenomegaly, no masses and bowel sounds normal  MS: no gross musculoskeletal defects noted, no edema  MS: Upper back and neck muscular tenderness to palpation, no grooves visible in shoulders at this time  SKIN: Erythematous skin between her breasts and also underneath her breasts in the intertriginous regions  PSYCH: mentation appears normal, affect normal/bright    Office Visit on 10/20/2021   Component Date Value Ref Range Status     TSH 10/20/2021 5.82 (A) 0.40 - 4.00 mU/L Final     Vitamin D, Total (25-Hydroxy) 10/20/2021 27  20 - 75 ug/L Final     Hemoglobin A1C 10/20/2021 5.2  0.0 - 5.6 % Final    " Normal <5.7%   Prediabetes 5.7-6.4%    Diabetes 6.5% or higher     Note: Adopted from ADA consensus guidelines.     Sodium 10/20/2021 139  133 - 144 mmol/L Final     Potassium 10/20/2021 3.8  3.4 - 5.3 mmol/L Final     Chloride 10/20/2021 108  94 - 109 mmol/L Final     Carbon Dioxide (CO2) 10/20/2021 28  20 - 32 mmol/L Final     Anion Gap 10/20/2021 3  3 - 14 mmol/L Final     Urea Nitrogen 10/20/2021 13  7 - 30 mg/dL Final     Creatinine 10/20/2021 0.76  0.52 - 1.04 mg/dL Final     Calcium 10/20/2021 8.0 (A) 8.5 - 10.1 mg/dL Final     Glucose 10/20/2021 71  70 - 99 mg/dL Final     Alkaline Phosphatase 10/20/2021 77  40 - 150 U/L Final     AST 10/20/2021 14  0 - 45 U/L Final     ALT 10/20/2021 26  0 - 50 U/L Final     Protein Total 10/20/2021 7.4  6.8 - 8.8 g/dL Final     Albumin 10/20/2021 3.7  3.4 - 5.0 g/dL Final     Bilirubin Total 10/20/2021 0.4  0.2 - 1.3 mg/dL Final     GFR Estimate 10/20/2021 >90  >60 mL/min/1.73m2 Final    As of July 11, 2021, eGFR is calculated by the CKD-EPI creatinine equation, without race adjustment. eGFR can be influenced by muscle mass, exercise, and diet. The reported eGFR is an estimation only and is only applicable if the renal function is stable.     WBC Count 10/20/2021 6.9  4.0 - 11.0 10e3/uL Final     RBC Count 10/20/2021 4.52  3.80 - 5.20 10e6/uL Final     Hemoglobin 10/20/2021 11.8  11.7 - 15.7 g/dL Final     Hematocrit 10/20/2021 38.0  35.0 - 47.0 % Final     MCV 10/20/2021 84  78 - 100 fL Final     MCH 10/20/2021 26.1 (A) 26.5 - 33.0 pg Final     MCHC 10/20/2021 31.1 (A) 31.5 - 36.5 g/dL Final     RDW 10/20/2021 15.7 (A) 10.0 - 15.0 % Final     Platelet Count 10/20/2021 255  150 - 450 10e3/uL Final     Free T4 10/20/2021 0.92  0.76 - 1.46 ng/dL Final     No results found for any visits on 04/04/22.

## 2022-04-05 ENCOUNTER — MYC MEDICAL ADVICE (OUTPATIENT)
Dept: FAMILY MEDICINE | Facility: OTHER | Age: 27
End: 2022-04-05
Payer: COMMERCIAL

## 2022-04-05 ENCOUNTER — TELEPHONE (OUTPATIENT)
Dept: FAMILY MEDICINE | Facility: OTHER | Age: 27
End: 2022-04-05
Payer: COMMERCIAL

## 2022-04-05 DIAGNOSIS — E66.812 CLASS 2 OBESITY WITH BODY MASS INDEX (BMI) OF 39.0 TO 39.9 IN ADULT, UNSPECIFIED OBESITY TYPE, UNSPECIFIED WHETHER SERIOUS COMORBIDITY PRESENT: ICD-10-CM

## 2022-04-05 DIAGNOSIS — E03.9 HYPOTHYROIDISM, UNSPECIFIED TYPE: Primary | ICD-10-CM

## 2022-04-05 RX ORDER — LEVOTHYROXINE SODIUM 175 UG/1
175 TABLET ORAL DAILY
Qty: 30 TABLET | Refills: 1 | Status: SHIPPED | OUTPATIENT
Start: 2022-04-05 | End: 2022-11-28

## 2022-04-05 NOTE — TELEPHONE ENCOUNTER
Prior Authorization Retail Medication Request    Medication/Dose: naltrexone-bupropion (CONTRAVE) 8-90 MG per 12 hr tablet  ICD code (if different than what is on RX):    Previously Tried and Failed:    Rationale:      Insurance Name:    Insurance ID:        Pharmacy Information (if different than what is on RX)  Name:    Phone:

## 2022-04-06 ENCOUNTER — TELEPHONE (OUTPATIENT)
Dept: SURGERY | Facility: CLINIC | Age: 27
End: 2022-04-06

## 2022-04-06 NOTE — TELEPHONE ENCOUNTER
Pt would like to schedule in-person appt with Nida and I am unable to schedule with any provider. I also had someone else try and he had same situation. I'm not sure if the chart is corrupt.    [phone]

## 2022-04-06 NOTE — TELEPHONE ENCOUNTER
Central Prior Authorization Team   Phone: [phone]    PA Initiation    Medication: naltrexone-bupropion (CONTRAVE) 8-90 MG per 12 hr tablet  Insurance Company: [employer] - Phone [phone] Fax [phone]  Pharmacy Filling the Rx: [id number] - [place], [place] - [address]  Filling Pharmacy Phone: [phone]  Filling Pharmacy Fax:    Start Date: 4/6/2022

## 2022-04-07 NOTE — TELEPHONE ENCOUNTER
PRIOR AUTHORIZATION DENIED    Medication: naltrexone-bupropion (CONTRAVE) 8-90 MG per 12 hr tablet    Denial Date: 4/7/2022    Denial Rational:  Per insurance, medication is excluded from patient's benefit plan and will not be covered. Review and appeal are not available because of this exclusion.      Appeal Information:  N/A       yes

## 2022-05-06 ENCOUNTER — VIRTUAL VISIT (OUTPATIENT)
Dept: SURGERY | Facility: CLINIC | Age: 27
End: 2022-05-06
Payer: COMMERCIAL

## 2022-05-06 VITALS — BODY MASS INDEX: 40.04 KG/M2 | HEIGHT: 63 IN | WEIGHT: 226 LBS

## 2022-05-06 DIAGNOSIS — R63.5 ABNORMAL WEIGHT GAIN: Primary | ICD-10-CM

## 2022-05-06 DIAGNOSIS — E66.01 MORBID OBESITY WITH BMI OF 40.0-44.9, ADULT (H): ICD-10-CM

## 2022-05-06 DIAGNOSIS — E28.2 PCOS (POLYCYSTIC OVARIAN SYNDROME): ICD-10-CM

## 2022-05-06 PROCEDURE — 99214 OFFICE O/P EST MOD 30 MIN: CPT | Mod: 95 | Performed by: PHYSICIAN ASSISTANT

## 2022-05-06 RX ORDER — PHENTERMINE HYDROCHLORIDE 15 MG/1
15 CAPSULE ORAL EVERY MORNING
Qty: 60 CAPSULE | Refills: 0 | Status: SHIPPED | OUTPATIENT
Start: 2022-05-06 | End: 2022-07-27

## 2022-05-06 NOTE — PROGRESS NOTES
Zelda is a 27 year old who is being evaluated via a billable video visit.      If the video visit is dropped, the invitation should be resent by: Text to cell phone: [phone]  Will anyone else be joining your video visit? No      Video-Visit Details    Type of service:  Video Visit    Video Start Time:  9:36    Video End Time:  10:03      30 minutes spent on the date of the encounter doing chart review, review of test results, patient visit and documentation       Originating Location (pt. Location): Home    Distant Location (provider location):  [facility] [place]     Platform used for Video Visit: [employer]            May 6, 2022      Return Medical Weight Management Note       Zelda Paulson  MRN:  [id number]  :  1995      Dear Jasmin Alonso,    I had the pleasure of doing a visit with your patient Zelda Paulson.  She is a 27 year old female who I am continuing to see for treatment of obesity related to:       2021   I have the following health issues associated with obesity: Polycystic Ovarian Syndrome, Infertility, Asthma, Hypothyroidism       INTERVAL HISTORY:  Patient was being seen for bariatric surgery with her last visit 9/3/2021. Since she had just gave birth 2021 she needed to hold off until 1 year post partum.  In the meantime her insurance changed.     Now her insurance does not cover the bariatric surgery at all.  Here today to discuss weight management. Saw her primary who prescribed contrave but it was not covered. It was denied because her insurance plan has an exclusion for weight loss medications      CURRENT WEIGHT:   226 lbs 0 oz    Wt Readings from Last 4 Encounters:   22 226 lb (102.5 kg)   22 220 lb 9.6 oz (100.1 kg)   10/20/21 211 lb (95.7 kg)   21 211 lb (95.7 kg)         DIETARY HISTORY  Meals Per Day: 3  Food Diary:   B: Up at 4:30 am.  Eating on way to work 5:15 am. Had [place] toast sticks from home. Coffee with  "creamer and water  L: 11 am - had leftover deepika - water  D: chicken breast and rice - water and can of Mtn Dew  Snacks Per Day: 1  Typical Snack: granola bar at lunch mid morning  Fluid Intake: 64 oz water, coffee, 1 can soda.  Alcohol is very infrequent. Once every 4 months  Meals at Restaurant per week: 2    Lives with  , 2 kids and patients parents.  Patient does most of the cooking and grocery shopping       Exercise: Doing kickboxing for the first week.  Twice weekly       ROS:  General  Fatigue: No  Sleep Quality: given she has a 9 month old. Stopped breast feeding January 2022. LMP  March 2022. Normally has irregular cycles  Insomnia: No  NO BIRTH CONTROL  HEENT  Dry Mouth: No  Hx of glaucoma: No  Vision changes: No  Cardiovascular  Chest Pain with Exertion: No  Reviewed interpretive results of EKG from 2/11/2015 as sinus rhythm  Palpitations: No  Hx of heart disease: No  HTN: No  Pulmonary  Shortness of breath at rest: No  Shortness of breath with exertion: No  Gastrointestinal  Nausea: No  Diarrhea: No  Heartburn: No  Abdominal pain: lower abdominal pain. - Has appointment with OB to have US to look for ovarian cyst  Constipation: No  History of pancreatitis:No  History of bowel obstruction: No  Psychiatric  Moods Stable: Yes  No history of eating disorder  Neurologic:  Hx of seizures: No  Hx of paresthesias:No  Headaches: gets migraines along    History of kidney stones: yes when she was age 18  History of kidney disease:   Endocrine:  Personal or family history of thyroid cancer: No      MEDICATIONS:   Current Outpatient Medications   Medication     albuterol (PROAIR HFA/PROVENTIL HFA/VENTOLIN HFA) 108 (90 Base) MCG/ACT inhaler     levothyroxine (SYNTHROID/LEVOTHROID) 175 MCG tablet     No current facility-administered medications for this visit.       PE:  Ht 5' 3\" (1.6 m)   Wt 226 lb (102.5 kg)   BMI 40.03 kg/m    GENERAL: Healthy, alert and no distress  EYES: Eyes grossly normal to " "inspection.  No discharge or erythema, or obvious scleral/conjunctival abnormalities.  RESP: No audible wheeze, cough, or visible cyanosis.  No visible retractions or increased work of breathing.    SKIN: Visible skin clear. No significant rash, abnormal pigmentation or lesions.  NEURO: Cranial nerves grossly intact.  Mentation and speech appropriate for age.  PSYCH: Mentation appears normal, affect normal/bright, judgement and insight intact, normal speech and appearance well-groomed.        ASSESSMENT/PLAN:   Class 3 severe obesity due to excess calories with Body Mass Index (BMI) of 40      We discussed the role of pharmacological agents in the treatment of obesity and the \"off-label\" use of medications in this practice. We discussed the risks and benefits of each. We discussed indications, contraindications, potential side effects, and estimated costs of each. Discussed that medications must always be used together with lifestyle changes such as improvements in diet choices, portion control and establishing and maintaining a regular exercise program.      With insurance not covering weight loss medication discussed phentermine. Side effects reviewed and patient information provided.     Patient has agreed to have their blood pressure and pulse checked 10 days after starting and will let clinic know if blood pressure goes above 140/90 or pulse greater than 100 bpm.        All of patients questions about the weight loss program were answered fully.    Follow up in 5-6 weeks with Lauren Bloch PharmD and July with provider      Maddison Frey MS, PA-C            "

## 2022-05-06 NOTE — PATIENT INSTRUCTIONS
It was great talking with you today!  Below is some of the information we discussed today.      You should be receiving a call to schedule with Lauren Bloch Pharm D for Mid June You may also schedule the appointment by calling [phone] or toll-free at [phone].    Please call [phone] to schedule with Maddison Frey PA-C for the end of August    Our direct nurse line is [phone] if you are having concerns. You will not be able to schedule at this number.        Maddison SCHAEFFER        Patient has agreed to have their blood pressure and pulse checked 10 days after starting and will let clinic know if blood pressure goes above 140/90 or pulse greater than 100 bpm.            Information about the Weight Loss Medication Phentermine      Phentermine is a stimulant medication related to the amphetamine class of medication but with a lower risk of dependence and addiction.  It is used for weight loss by suppressing the appetite region of the brain.  It also may speed up the metabolic rate and give a person more energy.  Like any medication there are potential side effects and the most common are:    Dry mouth occurs in almost everyone (hydrate well), fewer people experience Palpatations, fast heart rate, elevation of blood pressure, restlessness, insomnia, dizziness, change in mood, tremor, headache, changes in bowel movements,itchiness, changes in sex drive.    Take one tablet in the morning. Contact the nurse via [employer] or call [phone] if you have any questions or concerns. (Do not stop taking it if you don't think it's working. For some people it works without them knowing it.)    Phentermine is being prescribed because you identified hunger as one of the main causes for your extra weight.      Our patients on Phentermine find that they:    >feel less hunger    >find it easier to push the plate away   >have an easier time eating less    For some of our patients, these feelings are very real and  immediate. For other patients, the feelings are less obvious. They don't feel much of a change but find they've lost weight. Like all weight loss medications, Phentermine  works best when you help it work. This means:  1. Having less tempting high calorie (fattening) food around the house or office. (For people with strong cravings this is very important.)   2. Staying away from situations or people that may trigger your cravings .   3. Eating out only one time or less each week.  4. Eating your meals at a table with the TV or computer off.          In order to get refills of this or any medication we prescribe you must be seen in the medical weight mgmt clinic every 2-3 months.

## 2022-05-21 ENCOUNTER — HEALTH MAINTENANCE LETTER (OUTPATIENT)
Age: 27
End: 2022-05-21

## 2022-06-30 ENCOUNTER — VIRTUAL VISIT (OUTPATIENT)
Dept: PHARMACY | Facility: CLINIC | Age: 27
End: 2022-06-30
Payer: COMMERCIAL

## 2022-06-30 DIAGNOSIS — E66.09 CLASS 2 OBESITY DUE TO EXCESS CALORIES IN ADULT, UNSPECIFIED BMI, UNSPECIFIED WHETHER SERIOUS COMORBIDITY PRESENT: Primary | ICD-10-CM

## 2022-06-30 DIAGNOSIS — E66.812 CLASS 2 OBESITY DUE TO EXCESS CALORIES IN ADULT, UNSPECIFIED BMI, UNSPECIFIED WHETHER SERIOUS COMORBIDITY PRESENT: Primary | ICD-10-CM

## 2022-06-30 PROCEDURE — 99207 PR NO CHARGE LOS: CPT | Performed by: PHARMACIST

## 2022-06-30 NOTE — PROGRESS NOTES
"Clinical Pharmacy Consult:                                                    Zelda Paulson is a 27 year old female contacted for a clinical pharmacist consult.  She was referred to me from Maddison Frey PA-C. Insurance does not cover comprehensive medication review with Medication Therapy Management (MTM). Patient refuses private pay for Medication Therapy Management (MTM).  Therefore, completed one time consult today. Medication reconciliation was completed today.     Reason for Consult: Phentermine start follow up    Weight Management:   Phentermine 15 mg once daily in AM     Followed by Maddison Frey PA-C, seen 5/6/2022 for Return Medical Weight Management. Patient is experiencing the follow side effects: None. She finds that if she does have caffeine containing product she feels more jittery so steering clear of caffeine containing products. She is noticing that phentermine has suppressed appetite. She doesn't feel sluggish anymore. Did take blood pressure 1 week after starting phentermine: 130/74 pulse 80.   Diet/Eating Habits: Patient reports protein shake in AM, salad with protein + cheese stick for lunch, then normal dinner for dinner. No cravings for sweets.  Cut out soda (previously was drinking 4+ cans soda per day) and coffee completely since starting phentermine. Good water intake, now her main source of fluid.   Exercise/Activity: Patient reports kickboxing 2 times per week 60 minutes. Walks and running around the yard.      Current weight today: 211.8 lb   Initial Consult Weight: 226 lb   Cumulative Weight Loss:  14.2 lb, -6.3% from baseline   Wt Readings from Last 4 Encounters:   05/06/22 226 lb (102.5 kg)   04/04/22 220 lb 9.6 oz (100.1 kg)   10/20/21 211 lb (95.7 kg)   09/28/21 211 lb (95.7 kg)     Estimated body mass index is 40.03 kg/m  as calculated from the following:    Height as of 5/6/22: 5' 3\" (1.6 m).    Weight as of 5/6/22: 226 lb (102.5 kg).    BP Readings from Last 3 Encounters: "   04/04/22 102/70   10/20/21 122/60   09/28/21 136/80     Plan:  1. Continue current regimen for now.   2. Follow up with Maddison Frey PA-C - will defer back to Maddison at the next appointment. No need for MTM to follow up further unless medication questions/issues arise.     Lauren Bloch, PharmD, BCACP   Medication Therapy Management Pharmacist   [facility] [place]      Video-Visit Details    Type of service:  Video Visit    Video Start Time: 1:08 PM  Video End Time (time video stopped): 1:23 PM    Originating Location (pt. Location): Home    Distant Location (provider location):  [facility]    Mode of Communication:  Video Conference via [employer]

## 2022-06-30 NOTE — Clinical Note
MWM - doing well with phentermine start - see consult note for more information. JOSUÉ was not covered os just completed consult. However she isn't on much so figure consult is fine. Will defer tback to you and she sees you in 1 month. Morelia MOSES

## 2022-07-27 ENCOUNTER — OFFICE VISIT (OUTPATIENT)
Dept: FAMILY MEDICINE | Facility: OTHER | Age: 27
End: 2022-07-27
Payer: COMMERCIAL

## 2022-07-27 ENCOUNTER — TELEPHONE (OUTPATIENT)
Dept: FAMILY MEDICINE | Facility: OTHER | Age: 27
End: 2022-07-27

## 2022-07-27 ENCOUNTER — MYC MEDICAL ADVICE (OUTPATIENT)
Dept: FAMILY MEDICINE | Facility: OTHER | Age: 27
End: 2022-07-27

## 2022-07-27 ENCOUNTER — VIRTUAL VISIT (OUTPATIENT)
Dept: SURGERY | Facility: CLINIC | Age: 27
End: 2022-07-27
Payer: COMMERCIAL

## 2022-07-27 VITALS
RESPIRATION RATE: 16 BRPM | HEIGHT: 62 IN | OXYGEN SATURATION: 99 % | BODY MASS INDEX: 38.55 KG/M2 | WEIGHT: 209.5 LBS | DIASTOLIC BLOOD PRESSURE: 76 MMHG | SYSTOLIC BLOOD PRESSURE: 104 MMHG | TEMPERATURE: 98.2 F | HEART RATE: 80 BPM

## 2022-07-27 VITALS — WEIGHT: 209 LBS | HEIGHT: 63 IN | BODY MASS INDEX: 37.03 KG/M2

## 2022-07-27 DIAGNOSIS — Z98.891 S/P C-SECTION: ICD-10-CM

## 2022-07-27 DIAGNOSIS — B36.9 FUNGAL INFECTION OF SKIN: ICD-10-CM

## 2022-07-27 DIAGNOSIS — E03.9 HYPOTHYROIDISM, UNSPECIFIED TYPE: Primary | ICD-10-CM

## 2022-07-27 DIAGNOSIS — E66.01 MORBID OBESITY WITH BMI OF 40.0-44.9, ADULT (H): ICD-10-CM

## 2022-07-27 DIAGNOSIS — R63.5 ABNORMAL WEIGHT GAIN: ICD-10-CM

## 2022-07-27 DIAGNOSIS — E66.812 CLASS 2 SEVERE OBESITY DUE TO EXCESS CALORIES WITH SERIOUS COMORBIDITY AND BODY MASS INDEX (BMI) OF 37.0 TO 37.9 IN ADULT (H): Primary | ICD-10-CM

## 2022-07-27 DIAGNOSIS — J45.20 MILD INTERMITTENT ASTHMA WITHOUT COMPLICATION: Primary | ICD-10-CM

## 2022-07-27 DIAGNOSIS — E03.9 HYPOTHYROIDISM, UNSPECIFIED TYPE: ICD-10-CM

## 2022-07-27 DIAGNOSIS — E28.2 PCOS (POLYCYSTIC OVARIAN SYNDROME): ICD-10-CM

## 2022-07-27 DIAGNOSIS — E66.01 CLASS 2 SEVERE OBESITY DUE TO EXCESS CALORIES WITH SERIOUS COMORBIDITY AND BODY MASS INDEX (BMI) OF 37.0 TO 37.9 IN ADULT (H): Primary | ICD-10-CM

## 2022-07-27 DIAGNOSIS — J45.20 MILD INTERMITTENT ASTHMA WITHOUT COMPLICATION: ICD-10-CM

## 2022-07-27 PROBLEM — O99.019 ANEMIA OF PREGNANCY: Status: ACTIVE | Noted: 2022-07-27

## 2022-07-27 LAB
T4 FREE SERPL-MCNC: 0.86 NG/DL (ref 0.76–1.46)
TSH SERPL DL<=0.005 MIU/L-ACNC: 5.97 MU/L (ref 0.4–4)

## 2022-07-27 PROCEDURE — 99213 OFFICE O/P EST LOW 20 MIN: CPT | Mod: 95 | Performed by: PHYSICIAN ASSISTANT

## 2022-07-27 PROCEDURE — 99214 OFFICE O/P EST MOD 30 MIN: CPT | Performed by: NURSE PRACTITIONER

## 2022-07-27 PROCEDURE — 84439 ASSAY OF FREE THYROXINE: CPT | Performed by: NURSE PRACTITIONER

## 2022-07-27 PROCEDURE — 36415 COLL VENOUS BLD VENIPUNCTURE: CPT | Performed by: NURSE PRACTITIONER

## 2022-07-27 PROCEDURE — 84443 ASSAY THYROID STIM HORMONE: CPT | Performed by: NURSE PRACTITIONER

## 2022-07-27 RX ORDER — KETOCONAZOLE 20 MG/G
CREAM TOPICAL DAILY
Qty: 90 G | Refills: 0 | Status: SHIPPED | OUTPATIENT
Start: 2022-07-27 | End: 2023-03-29

## 2022-07-27 RX ORDER — PHENTERMINE HYDROCHLORIDE 15 MG/1
CAPSULE ORAL
Qty: 180 CAPSULE | Refills: 0 | Status: SHIPPED | OUTPATIENT
Start: 2022-07-27 | End: 2023-02-27

## 2022-07-27 ASSESSMENT — ASTHMA QUESTIONNAIRES

## 2022-07-27 ASSESSMENT — PAIN SCALES - GENERAL: PAINLEVEL: NO PAIN (0)

## 2022-07-27 NOTE — LETTER
My Asthma Action Plan    Name: Zelda Paulson   YOB: 1995  Date: 7/27/2022   My doctor: QUINTIN Fisher CNP   My clinic: [facility]        My Rescue Medicine:   Albuterol inhaler (Proair/Ventolin/Proventil HFA)  2-4 puffs EVERY 4 HOURS as needed. Use a spacer if recommended by your provider.   My Asthma Severity:   Mild intermittent asthma  Know your asthma triggers: upper respiratory infections             GREEN ZONE   Good Control    I feel good    No cough or wheeze    Can work, sleep and play without asthma symptoms       Take your asthma control medicine every day.     1. If exercise triggers your asthma, take your rescue medication    15 minutes before exercise or sports, and    During exercise if you have asthma symptoms  2. Spacer to use with inhaler: If you have a spacer, make sure to use it with your inhaler             YELLOW ZONE Getting Worse  I have ANY of these:    I do not feel good    Cough or wheeze    Chest feels tight    Wake up at night   1. Keep taking your Green Zone medications  2. Start taking your rescue medicine:    every 20 minutes for up to 1 hour. Then every 4 hours for 24-48 hours.  3. If you stay in the Yellow Zone for more than 12-24 hours, contact your doctor.  4. If you do not return to the Green Zone in 12-24 hours or you get worse, start taking your oral steroid medicine if prescribed by your provider.           RED ZONE Medical Alert - Get Help  I have ANY of these:    I feel awful    Medicine is not helping    Breathing getting harder    Trouble walking or talking    Nose opens wide to breathe       1. Take your rescue medicine NOW  2. If your provider has prescribed an oral steroid medicine, start taking it NOW  3. Call your doctor NOW  4. If you are still in the Red Zone after 20 minutes and you have not reached your doctor:    Take your rescue medicine again and    Call 911 or go to the emergency room right away    See your  regular doctor within 2 weeks of an Emergency Room or Urgent Care visit for follow-up treatment.          Annual Reminders:  Meet with Asthma Educator,  Flu Shot in the Fall, consider Pneumonia Vaccination for patients with asthma (aged 19 and older).    Pharmacy:    [id number] - [place], [place] - [address] [id number] - [place] - [address]    Electronically signed by QUINTIN Fisher CNP   Date: 07/27/22                    Asthma Triggers  How To Control Things That Make Your Asthma Worse    Triggers are things that make your asthma worse.  Look at the list below to help you find your triggers and   what you can do about them. You can help prevent asthma flare-ups by staying away from your triggers.      Trigger                                                          What you can do   Cigarette Smoke  Tobacco smoke can make asthma worse. Do not allow smoking in your home, car or around you.  Be sure no one smokes at a child s day care or school.  If you smoke, ask your health care provider for ways to help you quit.  Ask family members to quit too.  Ask your health care provider for a referral to Quit Plan to help you quit smoking, or call 3-314-351-PLAN.     Colds, Flu, Bronchitis  These are common triggers of asthma. Wash your hands often.  Don t touch your eyes, nose or mouth.  Get a flu shot every year.     Dust Mites  These are tiny bugs that live in cloth or carpet. They are too small to see. Wash sheets and blankets in hot water every week.   Encase pillows and mattress in dust mite proof covers.  Avoid having carpet if you can. If you have carpet, vacuum weekly.   Use a dust mask and HEPA vacuum.   Pollen and Outdoor Mold  Some people are allergic to trees, grass, or weed pollen, or molds. Try to keep your windows closed.  Limit time out doors when pollen count is high.   Ask you health care provider about taking medicine  during allergy season.     Animal Dander  Some people are allergic to skin flakes, urine or saliva from pets with fur or feathers. Keep pets with fur or feathers out of your home.    If you can t keep the pet outdoors, then keep the pet out of your bedroom.  Keep the bedroom door closed.  Keep pets off cloth furniture and away from stuffed toys.     Mice, Rats, and Cockroaches  Some people are allergic to the waste from these pests.   Cover food and garbage.  Clean up spills and food crumbs.  Store grease in the refrigerator.   Keep food out of the bedroom.   Indoor Mold  This can be a trigger if your home has high moisture. Fix leaking faucets, pipes, or other sources of water.   Clean moldy surfaces.  Dehumidify basement if it is damp and smelly.   Smoke, Strong Odors, and Sprays  These can reduce air quality. Stay away from strong odors and sprays, such as perfume, powder, hair spray, paints, smoke incense, paint, cleaning products, candles and new carpet.   Exercise or Sports  Some people with asthma have this trigger. Be active!  Ask your doctor about taking medicine before sports or exercise to prevent symptoms.    Warm up for 5-10 minutes before and after sports or exercise.     Other Triggers of Asthma  Cold air:  Cover your nose and mouth with a scarf.  Sometimes laughing or crying can be a trigger.  Some medicines and food can trigger asthma.

## 2022-07-27 NOTE — PROGRESS NOTES
Assessment & Plan     Mild intermittent asthma without complication  Stable     Hypothyroidism, unspecified type  Unstable  Recheck today, recently had increase in dosing so will see where she is at today.   - TSH with free T4 reflex; Future  - TSH with free T4 reflex  - T4 free    S/P   For about a year she has had redness and raw skin area just under her abdominal fold where her  scar is. She notes that at times it burns and she has tried multiple over the counter items to help with this.   - Suspect that this is likely a fungal skin reaction with some skin breakdown. I would recommend that we trial some fungal cream along with interdry sheets to help keep area from moisture. She will follow up with me to let me know if this improves. Could consider wound care vs dermatology consult as well.   - ketoconazole (NIZORAL) 2 % external cream; Apply topically daily  - Miscellaneous Order for DME - ONLY FOR DME    Fungal infection of skin    - ketoconazole (NIZORAL) 2 % external cream; Apply topically daily  - Miscellaneous Order for DME - ONLY FOR DME         Patient Instructions   - Start Ketokonazole cream daily, recommend applying after a shower and cleaning the area  - Recommend keeping area clean of moisture, we will fax a DME to your pharmacy for interdry or you can try local medical equipment or pharmacy  - Follow up with me, it can take 2-3 weeks to heal.   - TSH labs today and will let you know how you are doing.     Nice to meet you!      QUINTIN Fisher CNP  Questions or concerns please feel free to send me a [employer] message or call me  Phone : [phone]        Return in about 4 weeks (around 2022) for recheck symptoms.    QUINTIN Fisher CNP  [facility]    Lc Ndiaye is a 27 year old, presenting for the following health issues:  Derm Problem      History of Present Illness       Reason for visit:  Skin issues  Symptom onset:  More than a  "month  Symptoms include:  Red rash and irritation  Symptom intensity:  Moderate  Symptom progression:  Staying the same  Had these symptoms before:  No  What makes it worse:  No  What makes it better:  Ointment    She eats 2-3 servings of fruits and vegetables daily.She consumes 0 sweetened beverage(s) daily.She exercises with enough effort to increase her heart rate 30 to 60 minutes per day.  She exercises with enough effort to increase her heart rate 3 or less days per week. She is missing 1 dose(s) of medications per week.     Working on weight loss with the weight loss clinic  May 7th and down 25lbs.             Review of Systems         Objective    /76   Pulse 80   Temp 98.2  F (36.8  C) (Temporal)   Resp 16   Ht 1.575 m (5' 2\")   Wt 95 kg (209 lb 8 oz)   SpO2 99%   BMI 38.32 kg/m    Body mass index is 38.32 kg/m .  Physical Exam   GENERAL: healthy, alert and no distress  ABDOMEN: well-healed incision  scar   MS: no gross musculoskeletal defects noted, no edema  SKIN: pink tinted area just below  scar that appears to be some skin breakdown/fungal appearing.   NEURO: Normal strength and tone, mentation intact and speech normal  PSYCH: mentation appears normal, affect normal/bright    Results for orders placed or performed in visit on 22   TSH with free T4 reflex     Status: Abnormal   Result Value Ref Range    TSH 5.97 (H) 0.40 - 4.00 mU/L   T4 free     Status: Normal   Result Value Ref Range    Free T4 0.86 0.76 - 1.46 ng/dL                   .  ..  "

## 2022-07-27 NOTE — PATIENT INSTRUCTIONS
It was great seeing you today!  Below is some information we discussed.  Please follow up in 3 months      Maddison SCHAEFFER                                      Protein Link:  Protein Sources for Weight Loss  [email or web address]

## 2022-07-27 NOTE — PROGRESS NOTES
Zelda is a 27 year old who is being evaluated via a billable video visit.      If the video visit is dropped, the invitation should be resent by: Text to cell phone: [phone]  Will anyone else be joining your video visit? No      Video-Visit Details    Type of service:  Video Visit    Video Start Time: 3:35    Video End Time: 3:53    25 minutes spent on the date of the encounter doing chart review, review of test results, patient visit and documentation       Originating Location (pt. Location): Home    Distant Location (provider location):  [facility] [place]     Platform used for Video Visit: [employer]            2022      Return Virtual Medical Weight Management Note           Zelda Paulson  MRN:  [id number]  :  1995      Dear Jasmin Alonso,    I had the pleasure of doing a visit with your patient Zelda Paulson.  She is a 27 year old female who I am continuing to see for treatment of obesity related to:       2021   I have the following health issues associated with obesity: Polycystic Ovarian Syndrome, Infertility, Asthma, Hypothyroidism       INTERVAL HISTORY:  Patient started phentermine 15 mg capsules 2 months ago. At that same time decided to cut regular soda and caffeine from all sources.  Followed up with Lauren Bloch PharmD last month. Her blood pressure and pulse has been great. Checked in Epic today. Doing great and has lost 23 lbs.    There are days she will intentionally not take the phentermine. Does good on those days. Thinks the impact of the phentermine has slightly worn off      CURRENT WEIGHT:   209 lbs 0 oz    Wt Readings from Last 4 Encounters:   22 209 lb (94.8 kg)   22 209 lb 8 oz (95 kg)   22 226 lb (102.5 kg)   22 220 lb 9.6 oz (100.1 kg)         BARIATRIC METRICS:  Current Weight: 209 lb (94.8 kg) (pt reported)  Body mass index is 37.02 kg/m .   Wt change since last visit (lbs): -0.5  Cumulative weight  loss (lbs): 23.2        DIETARY HISTORY  Meals Per Day: 3  Food Diary:   B: Up at 4:30 am.  Eating on way to work 5:15 am. Protein shake  Yogurt around 9 am break  L: 11- 12 pm will have a protein shake or salad  D:  Chicken breast and wild rice  Snacks Per Day: 0  Fluid Intake: 64 oz water. Alcohol is very infrequent. Once every 4 months  Meals at Restaurant per week: very seldom    Lives with  , 2 kids and patients parents.  Patient does most of the cooking and grocery shopping       Exercise: Doing kickboxing twice weekly. Other days will go on family walks and biking. Swimming.        ROS: 7/27/22  General  Fatigue: better  Sleep Quality: given she has a 9 month old. Stopped breast feeding January 2022. LMP  March 2022. Normally has irregular cycles  Insomnia: No   Dry mouth: No  Birth control: mirena  HEENT  Hx of glaucoma: No  Vision changes: No  Cardiovascular  Chest Pain with Exertion: No  Reviewed interpretive results of EKG from 2/11/2015 as sinus rhythm  Palpitations: No  Hx of heart disease: No  HTN: No  Pulmonary  Shortness of breath at rest: No  Shortness of breath with exertion: No  Gastrointestinal  Nausea: No  Diarrhea: No  Heartburn: No  Abdominal pain: lower abdominal pain. - Has appointment with OB to have US to look for ovarian cyst  Constipation: No  History of pancreatitis:No  History of bowel obstruction: No  Psychiatric  Moods Stable: Yes  No history of eating disorder  Neurologic:  Hx of seizures: No  Hx of paresthesias:No  Headaches: gets migraines along    History of kidney stones: yes when she was age 18  History of kidney disease:   Endocrine:  Personal or family history of thyroid cancer: No      MEDICATIONS:   Current Outpatient Medications   Medication     albuterol (PROAIR HFA/PROVENTIL HFA/VENTOLIN HFA) 108 (90 Base) MCG/ACT inhaler     ketoconazole (NIZORAL) 2 % external cream     levothyroxine (SYNTHROID/LEVOTHROID) 175 MCG tablet     phentermine (ADIPEX-P) 15 MG capsule  "    No current facility-administered medications for this visit.       PE:  Ht 5' 3\" (1.6 m)   Wt 209 lb (94.8 kg)   BMI 37.02 kg/m    GENERAL: Healthy, alert and no distress  EYES: Eyes grossly normal to inspection.  No discharge or erythema, or obvious scleral/conjunctival abnormalities.  RESP: No audible wheeze, cough, or visible cyanosis.  No visible retractions or increased work of breathing.    SKIN: Visible skin clear. No significant rash, abnormal pigmentation or lesions.  NEURO: Cranial nerves grossly intact.  Mentation and speech appropriate for age.  PSYCH: Mentation appears normal, affect normal/bright, judgement and insight intact, normal speech and appearance well-groomed.        ASSESSMENT/PLAN:   Class 2 severe obesity due to excess calories with Body Mass Index (BMI) of 37  PCOS      Congratulated patient on her overall weight loss.  Will send over RX for phentermine 15 mg capsules. She can take 1-2 in the AM.  If she is consistently taking 2 capsules she can recheck blood pressure and let clinic know if elevated Does have a cuff at home.       Follow up in 3 months      Maddison Frey MS, PA-C            "

## 2022-07-27 NOTE — TELEPHONE ENCOUNTER
Future labs placed.       QUINTIN Fisher CNP  Questions or concerns please feel free to send me a [employer] message or call me  Phone : [phone]

## 2022-07-27 NOTE — PATIENT INSTRUCTIONS
- Start Ketokonazole cream daily, recommend applying after a shower and cleaning the area  - Recommend keeping area clean of moisture, we will fax a DME to your pharmacy for interdry or you can try local medical equipment or pharmacy  - Follow up with me, it can take 2-3 weeks to heal.   - TSH labs today and will let you know how you are doing.     Nice to meet you!      QUINTIN Fisher CNP  Questions or concerns please feel free to send me a [employer] message or call me  Phone : [phone]

## 2022-09-17 ENCOUNTER — HEALTH MAINTENANCE LETTER (OUTPATIENT)
Age: 27
End: 2022-09-17

## 2022-11-01 NOTE — PROGRESS NOTES
Zelda is a 27 year old who is being evaluated via a billable video visit.      If the video visit is dropped, the invitation should be resent by: Text to cell phone: [phone]  Will anyone else be joining your video visit? No      Video-Visit Details    Type of service:  Video Visit    Video Start Time: 11:36    Video End Time: 11:56      30 minutes spent on the date of the encounter doing chart review, review of test results, patient visit and documentation     Originating Location (pt. Location): Home    Distant Location (provider location):  [facility] [place]     Platform used for Video Visit: [employer]            2022        Return Virtual Medical Weight Management Note         Zelda Paulson  MRN:  [id number]  :  1995      Dear Jasmin Alonso,    I had the pleasure of doing a visit with your patient Zelda Paulson.  She is a 27 year old female who I am continuing to see for treatment of obesity related to:       2021   I have the following health issues associated with obesity: Polycystic Ovarian Syndrome, Infertility, Asthma, Hypothyroidism       INTERVAL HISTORY:  Patient was last seen 2022 and increased her phentermine 15 mg to either 1 or 2 in the morning. Most mornings she takes 1 tablet. Cravings are still down. Reviewed recent vitals.  No SOB or chest pain. No side effects. Does feel like the effects are slowly waining. Lost an additional 16 lbs.          CURRENT WEIGHT:   193 lbs 0 oz    Wt Readings from Last 4 Encounters:   22 193 lb (87.5 kg)   22 209 lb (94.8 kg)   22 209 lb 8 oz (95 kg)   22 226 lb (102.5 kg)       BARIATRIC METRICS:  Current Weight: 193 lb (87.5 kg) (pt reported)  Body mass index is 35.3 kg/m .   Wt change since last visit (lbs): -16  Cumulative weight loss (lbs): 39.2      DIETARY HISTORY  Meals Per Day: 3  Food Diary:   B: Up at 4:30 am.  Toast with water    L: salad with cottage cheese and  "ranch dressing. [employer]  D:  Chicken with pasta  Snacks Per Day: 1  Typical Snack: granola bar at lunch mid morning  Fluid Intake: 64 oz water, 1  Alcohol is very infrequent. Once every 4 months  Meals at Restaurant per week: 2    Lives with  , 2 kids and patients parents.  Patient does most of the cooking and grocery shopping       Exercise: Doing kickboxing twice weekly. Also outside walking       ROS: 11/2/2022  General  Fatigue: No  Insomnia: No  Birth Control: Mirena  HEENT  Dry Mouth: No  Hx of glaucoma: No  Vision changes:   Cardiovascular  Chest Pain with Exertion: No  Reviewed interpretive results of EKG from 2/11/2015 as sinus rhythm  Palpitations: No  Hx of heart disease: No  HTN: No  Pulmonary  Shortness of breath at rest: No  Shortness of breath with exertion: No  Gastrointestinal  Nausea: No  Diarrhea: No  Heartburn: No  Abdominal pain: lower abdominal pain. - Has appointment with OB to have US to look for ovarian cyst  Constipation: No  History of pancreatitis:No  History of bowel obstruction: No  Psychiatric  Moods Stable: Yes  No history of eating disorder  Neurologic:  Hx of seizures: No  Hx of paresthesias:No  Headaches: gets migraines along    History of kidney stones: yes when she was age 18  History of kidney disease:   Endocrine:  Personal or family history of thyroid cancer: No      MEDICATIONS:   Current Outpatient Medications   Medication     albuterol (PROAIR HFA/PROVENTIL HFA/VENTOLIN HFA) 108 (90 Base) MCG/ACT inhaler     ketoconazole (NIZORAL) 2 % external cream     levonorgestrel (MIRENA) 20 MCG/DAY IUD     levothyroxine (SYNTHROID/LEVOTHROID) 175 MCG tablet     phentermine (ADIPEX-P) 15 MG capsule     No current facility-administered medications for this visit.       PE:  Ht 5' 2\" (1.575 m)   Wt 193 lb (87.5 kg)   BMI 35.30 kg/m    GENERAL: Healthy, alert and no distress  EYES: Eyes grossly normal to inspection.  No discharge or erythema, or obvious scleral/conjunctival " "abnormalities.  RESP: No audible wheeze, cough, or visible cyanosis.  No visible retractions or increased work of breathing.    SKIN: Visible skin clear. No significant rash, abnormal pigmentation or lesions.  NEURO: Cranial nerves grossly intact.  Mentation and speech appropriate for age.  PSYCH: Mentation appears normal, affect normal/bright, judgement and insight intact, normal speech and appearance well-groomed.        ASSESSMENT/PLAN:   Class 2 obesity due to excess calories with Body Mass Index (BMI) of 35  PCOS  asthma      We discussed the role of pharmacological agents in the treatment of obesity and the \"off-label\" use of medications in this practice. We discussed the risks and benefits of each. We discussed indications, contraindications, potential side effects, and estimated costs of each. Discussed that medications must always be used together with lifestyle changes such as improvements in diet choices, portion control and establishing and maintaining a regular exercise program.        Discussed adding on topamax. Side effects reviewed. Patient is now on birth control. Advised of the increased risk of kidney stones. She will focus on water intake.  RX for topamax sent to pharmacy. Patient will continue with phentermine 15 mg daily only. She has plenty left from last prescription. Will let clinic know if she needs more before next appointment.      Follow up: Late January with Lauren Bloch PharmD and April with provider      Maddison Frey MS, PA-C          "

## 2022-11-02 ENCOUNTER — VIRTUAL VISIT (OUTPATIENT)
Dept: SURGERY | Facility: CLINIC | Age: 27
End: 2022-11-02
Payer: COMMERCIAL

## 2022-11-02 VITALS — HEIGHT: 62 IN | BODY MASS INDEX: 35.51 KG/M2 | WEIGHT: 193 LBS

## 2022-11-02 DIAGNOSIS — E28.2 PCOS (POLYCYSTIC OVARIAN SYNDROME): ICD-10-CM

## 2022-11-02 DIAGNOSIS — E66.01 CLASS 2 SEVERE OBESITY DUE TO EXCESS CALORIES WITH SERIOUS COMORBIDITY AND BODY MASS INDEX (BMI) OF 35.0 TO 35.9 IN ADULT (H): Primary | ICD-10-CM

## 2022-11-02 DIAGNOSIS — J45.20 MILD INTERMITTENT ASTHMA WITHOUT COMPLICATION: ICD-10-CM

## 2022-11-02 DIAGNOSIS — E66.812 CLASS 2 SEVERE OBESITY DUE TO EXCESS CALORIES WITH SERIOUS COMORBIDITY AND BODY MASS INDEX (BMI) OF 35.0 TO 35.9 IN ADULT (H): Primary | ICD-10-CM

## 2022-11-02 PROCEDURE — 99214 OFFICE O/P EST MOD 30 MIN: CPT | Mod: 95 | Performed by: PHYSICIAN ASSISTANT

## 2022-11-02 RX ORDER — TOPIRAMATE 25 MG/1
TABLET, FILM COATED ORAL
Qty: 180 TABLET | Refills: 0 | Status: SHIPPED | OUTPATIENT
Start: 2022-11-02 | End: 2023-03-29

## 2022-11-02 NOTE — PATIENT INSTRUCTIONS
"Nice to talk with you today. Thank you for allowing me the privilege of caring for you. We hope we provided you with the excellent service you deserve.     To ensure the quality of our services you may receive a patient satisfaction survey from an independent monitoring company.  The greatest compliment you can give is \"Likely to Recommend\"    Below is our plan we discussed.-  MAKSIM Washburn      You should be receiving a call to schedule with Lauren Bloch PharmD for the end of January. You may also schedule the appointment by calling [phone] or toll-free at [phone].    Please schedule a follow up with Maddison Frey PA-C for early April.  If you need to reach me sooner you can do so by calling [phone].    Have a great day!           MEDICATION STARTED AT THIS APPOINTMENT  We are starting topiramate at bedtime.  Start one tab, 25 mg, for a week. Go up to 50 mg (2 tabs) for the next week. Stay at 2 tabs until you are seen again. Call the nurse at [phone] if you have any questions or concerns. (Do not stop taking it if you don't think it's working. For some people it works even though they do not feel much different.)    Topiramate (Topamax) is a medication that is used most often to treat migraine headaches or for seizures. It has also been found to help with weight loss. Although it's not currently FDA approved for weight loss, it has been used safely for a number of years to help people who are carrying extra weight.     Just how topiramate helps with weight loss has not been exactly determined. However it seems to work on areas of the brain to quiet down signals related to eating.      Topiramate may make you:    >feel less interest in eating in between meals   >think less about food and eating   >find it easier to push the plate away   >find giving up pop easier    >have an easier time eating less    For some of our patients, the pills work right away. They feel and think quite differently " about food. Other patients don't feel much of a change but find in fact they have lost weight! Like all weight loss medications, topiramate works best when you help it work.  This means:    1) Have less tempting high calorie (fattening) food around the house or office    2) Have lower calorie food (fruits, vegetables,low fat meats and dairy) for snacks    3) Eat out only one time or less each week.   4) Eat your meals at a table with the TV or computer off.    Side-effects. Topiramate is generally well tolerated. The main side-effects we see are:   Tingling in hands,feet, or face (usually not very troublesome)   Mental confusion and word finding trouble (about 10% of patients have this.)     Feeling sleepy or a bit dopey- this goes away very soon after starting.    One of the dangers of topiramate is the possibility of birth defects--if you get pregnant when you are on it, there is the risk that your baby will be born with a cleft lip or palate.  If you are on topiramate and of child bearing age, you need to be on a reliable form of birth control or refrain from sexual intercourse.     Please refer to the pharmacy insert for more information on side-effects. Since many pharmacists are not familiar with the use of topiramate in weight loss, calling the clinic will get you the most accurate information on the use of this medication for weight loss.    In order to get refills of this or any medication we prescribe you must be seen in the medical weight mgmt clinic every 2-3 months.     If you have decided not to continue use of topiramate, it is important for you to decrease your dose slowly to avoid risk of seizures.  Please decrease your dose as follows:  25 mg daily for 3 days  Then stop

## 2022-11-04 ENCOUNTER — TELEPHONE (OUTPATIENT)
Dept: SURGERY | Facility: CLINIC | Age: 27
End: 2022-11-04

## 2022-11-04 NOTE — TELEPHONE ENCOUNTER
MTM referral from: [facility] visit (referral by provider)    MTM referral outreach attempt #2 on November 4, 2022 at 12:06 PM      Outcome: Patient not reachable after several attempts, will route to MTM Pharmacist/Provider as an FYI.  MT scheduling number is [phone].  Thank you for the referral.    Adolfo Day, MTM coordinator

## 2022-11-08 ENCOUNTER — LAB (OUTPATIENT)
Dept: LAB | Facility: OTHER | Age: 27
End: 2022-11-08
Payer: COMMERCIAL

## 2022-11-08 DIAGNOSIS — E03.9 HYPOTHYROIDISM, UNSPECIFIED TYPE: ICD-10-CM

## 2022-11-08 LAB — TSH SERPL DL<=0.005 MIU/L-ACNC: 2.52 MU/L (ref 0.4–4)

## 2022-11-08 PROCEDURE — 36415 COLL VENOUS BLD VENIPUNCTURE: CPT

## 2022-11-08 PROCEDURE — 84443 ASSAY THYROID STIM HORMONE: CPT

## 2022-11-28 ENCOUNTER — MYC MEDICAL ADVICE (OUTPATIENT)
Dept: FAMILY MEDICINE | Facility: OTHER | Age: 27
End: 2022-11-28

## 2022-11-28 DIAGNOSIS — E03.9 HYPOTHYROIDISM, UNSPECIFIED TYPE: ICD-10-CM

## 2022-11-28 RX ORDER — LEVOTHYROXINE SODIUM 175 UG/1
175 TABLET ORAL DAILY
Qty: 90 TABLET | Refills: 3 | Status: SHIPPED | OUTPATIENT
Start: 2022-11-28 | End: 2023-11-20

## 2022-11-28 RX ORDER — LEVOTHYROXINE SODIUM 175 UG/1
175 TABLET ORAL DAILY
Qty: 30 TABLET | Refills: 1 | Status: SHIPPED | OUTPATIENT
Start: 2022-11-28 | End: 2022-11-28

## 2022-11-28 NOTE — TELEPHONE ENCOUNTER
Refill sent and mychart reviewed.       QUINTIN Fisher CNP  Questions or concerns please feel free to send me a [employer] message or call me  Phone : [phone]

## 2022-11-29 ENCOUNTER — MYC MEDICAL ADVICE (OUTPATIENT)
Dept: FAMILY MEDICINE | Facility: OTHER | Age: 27
End: 2022-11-29

## 2022-11-30 NOTE — TELEPHONE ENCOUNTER
Please schedule a visit in person or virtual to discuss letter.       QUINTIN Fisher CNP  Questions or concerns please feel free to send me a Desk message or call me  Phone : [phone]

## 2022-12-05 ENCOUNTER — VIRTUAL VISIT (OUTPATIENT)
Dept: FAMILY MEDICINE | Facility: OTHER | Age: 27
End: 2022-12-05
Payer: COMMERCIAL

## 2022-12-05 DIAGNOSIS — E66.812 CLASS 2 SEVERE OBESITY DUE TO EXCESS CALORIES WITH SERIOUS COMORBIDITY AND BODY MASS INDEX (BMI) OF 35.0 TO 35.9 IN ADULT (H): ICD-10-CM

## 2022-12-05 DIAGNOSIS — R21 RASH: ICD-10-CM

## 2022-12-05 DIAGNOSIS — E66.01 CLASS 2 SEVERE OBESITY DUE TO EXCESS CALORIES WITH SERIOUS COMORBIDITY AND BODY MASS INDEX (BMI) OF 35.0 TO 35.9 IN ADULT (H): ICD-10-CM

## 2022-12-05 DIAGNOSIS — Z98.891 S/P C-SECTION: Primary | ICD-10-CM

## 2022-12-05 PROCEDURE — 99213 OFFICE O/P EST LOW 20 MIN: CPT | Mod: 95 | Performed by: NURSE PRACTITIONER

## 2022-12-05 NOTE — PROGRESS NOTES
Zelda is a 27 year old who is being evaluated via a billable video visit.      How would you like to obtain your AVS? MyChart  If the video visit is dropped, the invitation should be resent by: Text to cell phone: [phone]  Will anyone else be joining your video visit? No        Assessment & Plan     S/P   Rash  Class 2 severe obesity due to excess calories with serious comorbidity and body mass index (BMI) of 35.0 to 35.9 in adult (H)    Letter written that patient has tried multiple prescribed and over the counter treatments for her abdominal fold rashes. Recommend secondary treatment per surgeon.     The patient indicates understanding of these issues and agrees with the plan.           There are no Patient Instructions on file for this visit.    No follow-ups on file.    QUINTIN Fisher [facility]   Zelda is a 27 year old, presenting for the following health issues:  Skin Check      History of Present Illness       Reason for visit:  Rash skin issues    She eats 2-3 servings of fruits and vegetables daily.She consumes 0 sweetened beverage(s) daily.She exercises with enough effort to increase her heart rate 20 to 29 minutes per day.  She exercises with enough effort to increase her heart rate 3 or less days per week. She is missing 1 dose(s) of medications per week.  She is not taking prescribed medications regularly due to remembering to take.     [place] [employer] [place] [employer] questions in regards to skin panis irritation  Ongoing >1.5 years post c section with daughter.   -baby powder  -cream prescribed by you  -dry foam absorption recommended by you  -weight loss  -trying to maintain personal hygiene       Review of Systems         Objective           Vitals:  No vitals were obtained today due to virtual visit.    Physical Exam   GENERAL: Healthy, alert and no distress  SKIN: Rash- redness/pink irritation along under abdominal fold region.   NEURO: Cranial nerves  grossly intact.  Mentation and speech appropriate for age.  PSYCH: Mentation appears normal, affect normal/bright, judgement and insight intact, normal speech and appearance well-groomed.    Diagnostic: None             Video-Visit Details    Video Start Time: 11:31 AM    Type of service:  Video Visit    Video End Time:11:36 AM    Originating Location (pt. Location): Home      Distant Location (provider location):  On-site    Platform used for Video Visit: Eleno

## 2022-12-05 NOTE — LETTER
[facility]  [address]  [place] [address]  Phone: [phone]    2022        Zelda Paulson  [address] [place] [address]          To whom it may concern:    RE: Zelda Paulson    For the past year and half patient has struggled with a rash underneath her lower abdominal area after having a  with her daughter. She has tried multiple over the counter creams and powders along with prescribed ketoconazole cream and dry absorption foam without any improvements. At this time I did recommend that she consider removal of some skin to help this as it will continue to occur despite medication. Please let me know if you need any additional information.     Please contact me for questions or concerns.      Sincerely,        QUINTIN Fisher CNP

## 2022-12-14 ENCOUNTER — MYC MEDICAL ADVICE (OUTPATIENT)
Dept: FAMILY MEDICINE | Facility: OTHER | Age: 27
End: 2022-12-14

## 2022-12-14 NOTE — TELEPHONE ENCOUNTER
Please see [employer] message.      Shelly Go RN  [facility] ~ Registered Nurse  Clinic Triage ~ [place] [facility]  December 14, 2022

## 2022-12-16 ENCOUNTER — TELEPHONE (OUTPATIENT)
Dept: FAMILY MEDICINE | Facility: OTHER | Age: 27
End: 2022-12-16

## 2022-12-16 NOTE — TELEPHONE ENCOUNTER
Letter faxed to [facility] at [phone].     Confirmed delivery via RightFax. Called Plastic Surgery clinic and confirmed delivery as well.

## 2022-12-16 NOTE — TELEPHONE ENCOUNTER
Please fax letter to the [phone]  [facility]      Letter is in my basket. Please call [facility] to confirm they received this as it was not received the first time.     QUINTIN Fisher CNP  Questions or concerns please feel free to send me a [email or web address] message or call me  Phone : [phone]

## 2023-01-09 ENCOUNTER — TELEPHONE (OUTPATIENT)
Dept: PHARMACY | Facility: CLINIC | Age: 28
End: 2023-01-09

## 2023-01-09 NOTE — TELEPHONE ENCOUNTER
LVM, sent mychart    Appt on 1/25/23 with Lauren Bloch needs to be rescheduled due to the provider being unavailable. Reschedule with the same provider, next available appt

## 2023-01-11 NOTE — TELEPHONE ENCOUNTER
LVMx2, sent mychart x2. Appointment will be cancelled   Spine appears normal, movement of extremities grossly intact.

## 2023-01-30 ENCOUNTER — LAB REQUISITION (OUTPATIENT)
Dept: LAB | Facility: CLINIC | Age: 28
End: 2023-01-30

## 2023-01-30 DIAGNOSIS — Z01.419 ENCOUNTER FOR GYNECOLOGICAL EXAMINATION (GENERAL) (ROUTINE) WITHOUT ABNORMAL FINDINGS: ICD-10-CM

## 2023-01-30 LAB
ERYTHROCYTE [DISTWIDTH] IN BLOOD BY AUTOMATED COUNT: 12.6 % (ref 10–15)
FERRITIN SERPL-MCNC: 32 NG/ML (ref 6–175)
HCT VFR BLD AUTO: 40.6 % (ref 35–47)
HGB BLD-MCNC: 12.7 G/DL (ref 11.7–15.7)
MCH RBC QN AUTO: 27.7 PG (ref 26.5–33)
MCHC RBC AUTO-ENTMCNC: 31.3 G/DL (ref 31.5–36.5)
MCV RBC AUTO: 89 FL (ref 78–100)
PLATELET # BLD AUTO: 233 10E3/UL (ref 150–450)
RBC # BLD AUTO: 4.59 10E6/UL (ref 3.8–5.2)
WBC # BLD AUTO: 6.7 10E3/UL (ref 4–11)

## 2023-01-30 PROCEDURE — 82728 ASSAY OF FERRITIN: CPT | Performed by: OBSTETRICS & GYNECOLOGY

## 2023-01-30 PROCEDURE — 85014 HEMATOCRIT: CPT | Performed by: OBSTETRICS & GYNECOLOGY

## 2023-02-01 ENCOUNTER — VIRTUAL VISIT (OUTPATIENT)
Dept: PHARMACY | Facility: CLINIC | Age: 28
End: 2023-02-01
Payer: COMMERCIAL

## 2023-02-01 DIAGNOSIS — E66.812 CLASS 2 OBESITY DUE TO EXCESS CALORIES IN ADULT, UNSPECIFIED BMI, UNSPECIFIED WHETHER SERIOUS COMORBIDITY PRESENT: Primary | ICD-10-CM

## 2023-02-01 DIAGNOSIS — E66.09 CLASS 2 OBESITY DUE TO EXCESS CALORIES IN ADULT, UNSPECIFIED BMI, UNSPECIFIED WHETHER SERIOUS COMORBIDITY PRESENT: Primary | ICD-10-CM

## 2023-02-01 PROCEDURE — 99207 PR NO CHARGE LOS: CPT | Performed by: PHARMACIST

## 2023-02-02 NOTE — PROGRESS NOTES
"Disease State Management Encounter:                          Zelda Paulson is a 27 year old female called for for an initial visit. She was referred to me from Maddison Frey PA-C. Insurance does not cover comprehensive medication review with Medication Therapy Management (MTM). Patient declines private pay. Therefore, completed one time consult today.     Reason for visit: phentermine/topiramate follow up.      Weight Management:   Phentermine 15-30 mg once daily in AM  Topiramate 50 mg once daily - not currently taking     Followed by Maddison Frey PA-C, seen 11/2/2022 for Return Medical Weight Management. Patient is experiencing the follow side effects: None as of now. She found she has had persistent nausea when started topiramate. Stopped topiramate due to this then restarted to determine if topiramate causing nausea and did, so stopped again. Checked blood pressure recently, was 128/82 mmHg. She reports alternating between 15 mg and 30 mg daily, reports as of recently has been doing 30 mg daily in AM, but does find that later in the day she is hungrier and eating larger portions at other meals.  She reports she is not skipping meals. Not snacking in between. Patient reports doing kickboxing twice weekly. Also walking when nice out.  Does find phentermine works well in AM.   Failed medications: topiramate: unmanageable nausea.     Current weight today: 189 lb    Wt Readings from Last 4 Encounters:   11/02/22 193 lb (87.5 kg)   07/27/22 209 lb (94.8 kg)   07/27/22 209 lb 8 oz (95 kg)   05/06/22 226 lb (102.5 kg)     Estimated body mass index is 35.3 kg/m  as calculated from the following:    Height as of 11/2/22: 5' 2\" (1.575 m).    Weight as of 11/2/22: 193 lb (87.5 kg).    Assessment/Plan:    1. Try phentermine 15 mg in AM and 15 mg at noon.     Follow-up: Maddison Frey PA-C in 6 weeks as planned    I spent 20 minutes with this patient today. All changes were made via collaborative practice agreement with Maddison " MAKSIM Frey  . A copy of the visit note was provided to the patient's provider(s).    A summary of these recommendations was declined by the patient.    Lauren Bloch, PharmD, [facility]   Medication Therapy Management Pharmacist   [facility]       Medication Therapy Recommendations  No medication therapy recommendations to display

## 2023-02-27 DIAGNOSIS — R63.5 ABNORMAL WEIGHT GAIN: ICD-10-CM

## 2023-02-27 DIAGNOSIS — E66.01 MORBID OBESITY WITH BMI OF 40.0-44.9, ADULT (H): ICD-10-CM

## 2023-02-27 NOTE — TELEPHONE ENCOUNTER
Refilling phentermine and sending to covering provider Nida Fortune PA-C while Maddison Frey PA-C is on RICARDO.     BP Readings from Last 3 Encounters:   07/27/22 104/76   04/04/22 102/70   10/20/21 122/60     Lauren Bloch, PharmD, BCACP   Medication Therapy Management Pharmacist   [facility] [place]

## 2023-02-28 RX ORDER — PHENTERMINE HYDROCHLORIDE 15 MG/1
CAPSULE ORAL
Qty: 60 CAPSULE | Refills: 1 | Status: ON HOLD | OUTPATIENT
Start: 2023-02-28 | End: 2023-04-14

## 2023-03-28 ASSESSMENT — ASTHMA QUESTIONNAIRES
QUESTION_3 LAST FOUR WEEKS HOW OFTEN DID YOUR ASTHMA SYMPTOMS (WHEEZING, COUGHING, SHORTNESS OF BREATH, CHEST TIGHTNESS OR PAIN) WAKE YOU UP AT NIGHT OR EARLIER THAN USUAL IN THE MORNING: ONCE OR TWICE
QUESTION_4 LAST FOUR WEEKS HOW OFTEN HAVE YOU USED YOUR RESCUE INHALER OR NEBULIZER MEDICATION (SUCH AS ALBUTEROL): NOT AT ALL
QUESTION_5 LAST FOUR WEEKS HOW WOULD YOU RATE YOUR ASTHMA CONTROL: WELL CONTROLLED
QUESTION_2 LAST FOUR WEEKS HOW OFTEN HAVE YOU HAD SHORTNESS OF BREATH: NOT AT ALL
QUESTION_1 LAST FOUR WEEKS HOW MUCH OF THE TIME DID YOUR ASTHMA KEEP YOU FROM GETTING AS MUCH DONE AT WORK, SCHOOL OR AT HOME: NONE OF THE TIME
ACT_TOTALSCORE: 23
ACT_TOTALSCORE: 23

## 2023-03-29 ENCOUNTER — OFFICE VISIT (OUTPATIENT)
Dept: FAMILY MEDICINE | Facility: OTHER | Age: 28
End: 2023-03-29
Payer: COMMERCIAL

## 2023-03-29 VITALS
BODY MASS INDEX: 35.33 KG/M2 | HEART RATE: 85 BPM | RESPIRATION RATE: 19 BRPM | DIASTOLIC BLOOD PRESSURE: 68 MMHG | SYSTOLIC BLOOD PRESSURE: 102 MMHG | TEMPERATURE: 98.1 F | WEIGHT: 192 LBS | HEIGHT: 62 IN | OXYGEN SATURATION: 100 %

## 2023-03-29 DIAGNOSIS — E03.9 HYPOTHYROIDISM, UNSPECIFIED TYPE: ICD-10-CM

## 2023-03-29 DIAGNOSIS — E66.01 CLASS 2 SEVERE OBESITY DUE TO EXCESS CALORIES WITH SERIOUS COMORBIDITY AND BODY MASS INDEX (BMI) OF 35.0 TO 35.9 IN ADULT (H): ICD-10-CM

## 2023-03-29 DIAGNOSIS — Z86.79 HISTORY OF HYPERTENSION: ICD-10-CM

## 2023-03-29 DIAGNOSIS — E66.812 CLASS 2 SEVERE OBESITY DUE TO EXCESS CALORIES WITH SERIOUS COMORBIDITY AND BODY MASS INDEX (BMI) OF 35.0 TO 35.9 IN ADULT (H): ICD-10-CM

## 2023-03-29 DIAGNOSIS — J01.90 ACUTE SINUSITIS WITH SYMPTOMS GREATER THAN 10 DAYS: ICD-10-CM

## 2023-03-29 DIAGNOSIS — E61.1 IRON DEFICIENCY: ICD-10-CM

## 2023-03-29 DIAGNOSIS — R51.9 GENERALIZED HEADACHES: ICD-10-CM

## 2023-03-29 DIAGNOSIS — N62 MACROMASTIA: ICD-10-CM

## 2023-03-29 DIAGNOSIS — E28.2 PCOS (POLYCYSTIC OVARIAN SYNDROME): ICD-10-CM

## 2023-03-29 DIAGNOSIS — J45.20 MILD INTERMITTENT ASTHMA WITHOUT COMPLICATION: ICD-10-CM

## 2023-03-29 DIAGNOSIS — Z01.818 PREOP GENERAL PHYSICAL EXAM: Primary | ICD-10-CM

## 2023-03-29 PROBLEM — I10 HYPERTENSION, UNSPECIFIED TYPE: Status: RESOLVED | Noted: 2021-09-28 | Resolved: 2023-03-29

## 2023-03-29 LAB
ALBUMIN SERPL BCG-MCNC: 4.1 G/DL (ref 3.5–5.2)
ALP SERPL-CCNC: 69 U/L (ref 35–104)
ALT SERPL W P-5'-P-CCNC: 28 U/L (ref 10–35)
ANION GAP SERPL CALCULATED.3IONS-SCNC: 7 MMOL/L (ref 7–15)
AST SERPL W P-5'-P-CCNC: 19 U/L (ref 10–35)
BASOPHILS # BLD AUTO: 0 10E3/UL (ref 0–0.2)
BASOPHILS NFR BLD AUTO: 0 %
BILIRUB SERPL-MCNC: 0.3 MG/DL
BUN SERPL-MCNC: 9.9 MG/DL (ref 6–20)
CALCIUM SERPL-MCNC: 8.8 MG/DL (ref 8.6–10)
CHLORIDE SERPL-SCNC: 106 MMOL/L (ref 98–107)
CREAT SERPL-MCNC: 0.67 MG/DL (ref 0.51–0.95)
DEPRECATED HCO3 PLAS-SCNC: 27 MMOL/L (ref 22–29)
EOSINOPHIL # BLD AUTO: 0.1 10E3/UL (ref 0–0.7)
EOSINOPHIL NFR BLD AUTO: 1 %
ERYTHROCYTE [DISTWIDTH] IN BLOOD BY AUTOMATED COUNT: 13.4 % (ref 10–15)
GFR SERPL CREATININE-BSD FRML MDRD: >90 ML/MIN/1.73M2
GLUCOSE SERPL-MCNC: 83 MG/DL (ref 70–99)
HCT VFR BLD AUTO: 41.5 % (ref 35–47)
HGB BLD-MCNC: 13 G/DL (ref 11.7–15.7)
IRON BINDING CAPACITY (ROCHE): 261 UG/DL (ref 240–430)
IRON SATN MFR SERPL: 40 % (ref 15–46)
IRON SERPL-MCNC: 105 UG/DL (ref 37–145)
LYMPHOCYTES # BLD AUTO: 2.4 10E3/UL (ref 0.8–5.3)
LYMPHOCYTES NFR BLD AUTO: 30 %
MCH RBC QN AUTO: 28.1 PG (ref 26.5–33)
MCHC RBC AUTO-ENTMCNC: 31.3 G/DL (ref 31.5–36.5)
MCV RBC AUTO: 90 FL (ref 78–100)
MONOCYTES # BLD AUTO: 0.6 10E3/UL (ref 0–1.3)
MONOCYTES NFR BLD AUTO: 8 %
NEUTROPHILS # BLD AUTO: 4.8 10E3/UL (ref 1.6–8.3)
NEUTROPHILS NFR BLD AUTO: 61 %
PLATELET # BLD AUTO: 274 10E3/UL (ref 150–450)
POTASSIUM SERPL-SCNC: 4.6 MMOL/L (ref 3.4–5.3)
PROT SERPL-MCNC: 7 G/DL (ref 6.4–8.3)
RBC # BLD AUTO: 4.63 10E6/UL (ref 3.8–5.2)
SODIUM SERPL-SCNC: 140 MMOL/L (ref 136–145)
TSH SERPL DL<=0.005 MIU/L-ACNC: 2.76 UIU/ML (ref 0.3–4.2)
WBC # BLD AUTO: 7.9 10E3/UL (ref 4–11)

## 2023-03-29 PROCEDURE — 83550 IRON BINDING TEST: CPT | Performed by: NURSE PRACTITIONER

## 2023-03-29 PROCEDURE — 99214 OFFICE O/P EST MOD 30 MIN: CPT | Performed by: NURSE PRACTITIONER

## 2023-03-29 PROCEDURE — 80050 GENERAL HEALTH PANEL: CPT | Performed by: NURSE PRACTITIONER

## 2023-03-29 PROCEDURE — 36415 COLL VENOUS BLD VENIPUNCTURE: CPT | Performed by: NURSE PRACTITIONER

## 2023-03-29 PROCEDURE — 83540 ASSAY OF IRON: CPT | Performed by: NURSE PRACTITIONER

## 2023-03-29 RX ORDER — KETOROLAC TROMETHAMINE 10 MG/1
1 TABLET, FILM COATED ORAL EVERY 6 HOURS PRN
COMMUNITY
Start: 2023-03-17 | End: 2023-03-29

## 2023-03-29 RX ORDER — CYCLOBENZAPRINE HCL 5 MG
1 TABLET ORAL EVERY 8 HOURS PRN
COMMUNITY
Start: 2023-03-17 | End: 2023-12-08

## 2023-03-29 ASSESSMENT — PAIN SCALES - GENERAL: PAINLEVEL: NO PAIN (0)
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
## 2023-03-29 NOTE — PATIENT INSTRUCTIONS
For informational purposes only. Not to replace the advice of your health care provider. Copyright   2003,  [facility] [facility]. All rights reserved. Clinically reviewed by Sri Wilkerson MD. [employer] [id number] - REV .  Preparing for Your Surgery  Getting started  A nurse will call you to review your health history and instructions. They will give you an arrival time based on your scheduled surgery time. Please be ready to share:  Your doctor's clinic name and phone number  Your medical, surgical, and anesthesia history  A list of allergies and sensitivities  A list of medicines, including herbal treatments and over-the-counter drugs  Whether the patient has a legal guardian (ask how to send us the papers in advance)  Please tell us if you're pregnant--or if there's any chance you might be pregnant. Some surgeries may injure a fetus (unborn baby), so they require a pregnancy test. Surgeries that are safe for a fetus don't always need a test, and you can choose whether to have one.   If you have a child who's having surgery, please ask for a copy of Preparing for Your Child's Surgery.    Preparing for surgery  Within 10 to 30 days of surgery: Have a pre-op exam (sometimes called an H&P, or History and Physical). This can be done at a clinic or pre-operative center.  If you're having a , you may not need this exam. Talk to your care team.  At your pre-op exam, talk to your care team about all medicines you take. If you need to stop any medicines before surgery, ask when to start taking them again.  We do this for your safety. Many medicines can make you bleed too much during surgery. Some change how well surgery (anesthesia) drugs work.  Call your insurance company to let them know you're having surgery. (If you don't have insurance, call [phone].)  Call your clinic if there's any change in your health. This includes signs of a cold or flu (sore throat, runny nose, cough, rash, fever). It  also includes a scrape or scratch near the surgery site.  If you have questions on the day of surgery, call your hospital or surgery center.  Eating and drinking guidelines  For your safety: Unless your surgeon tells you otherwise, follow the guidelines below.  Eat and drink as usual until 8 hours before you arrive for surgery. After that, no food or milk.  Drink clear liquids until 2 hours before you arrive. These are liquids you can see through, like water, Gatorade, and Propel Water. They also include plain black coffee and tea (no cream or milk), candy, and breath mints. You can spit out gum when you arrive.  If you drink alcohol: Stop drinking it the night before surgery.  If your care team tells you to take medicine on the morning of surgery, it's okay to take it with a sip of water.  Preventing infection  Shower or bathe the night before and morning of your surgery. Follow the instructions your clinic gave you. (If no instructions, use regular soap.)  Don't shave or clip hair near your surgery site. We'll remove the hair if needed.  Don't smoke or vape the morning of surgery. You may chew nicotine gum up to 2 hours before surgery. A nicotine patch is okay.  Note: Some surgeries require you to completely quit smoking and nicotine. Check with your surgeon.  Your care team will make every effort to keep you safe from infection. We will:  Clean our hands often with soap and water (or an alcohol-based hand rub).  Clean the skin at your surgery site with a special soap that kills germs.  Give you a special gown to keep you warm. (Cold raises the risk of infection.)  Wear special hair covers, masks, gowns and gloves during surgery.  Give antibiotic medicine, if prescribed. Not all surgeries need antibiotics.  What to bring on the day of surgery  Photo ID and insurance card  Copy of your health care directive, if you have one  Glasses and hearing aids (bring cases)  You can't wear contacts during surgery  Inhaler and  eye drops, if you use them (tell us about these when you arrive)  CPAP machine or breathing device, if you use them  A few personal items, if spending the night  If you have . . .  A pacemaker, ICD (cardiac defibrillator) or other implant: Bring the ID card.  An implanted stimulator: Bring the remote control.  A legal guardian: Bring a copy of the certified (court-stamped) guardianship papers.  Please remove any jewelry, including body piercings. Leave jewelry and other valuables at home.  If you're going home the day of surgery  You must have a responsible adult drive you home. They should stay with you overnight as well.  If you don't have someone to stay with you, and you aren't safe to go home alone, we may keep you overnight. Insurance often won't pay for this.  After surgery  If it's hard to control your pain or you need more pain medicine, please call your surgeon's office.  Questions?   If you have any questions for your care team, list them here: _________________________________________________________________________________________________________________________________________________________________________ ____________________________________ ____________________________________ ____________________________________    How to Take Your Medication Before Surgery  - HOLD (do not take) Phentermine, NSAIDS, Vitamins.   - STOP taking all vitamins and herbal supplements 14 days before surgery.  - Ok to take Synthroid AM of surgery.
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
## 2023-03-31 ENCOUNTER — TELEPHONE (OUTPATIENT)
Dept: FAMILY MEDICINE | Facility: OTHER | Age: 28
End: 2023-03-31
Payer: COMMERCIAL

## 2023-03-31 NOTE — TELEPHONE ENCOUNTER
Please fax preop      QUINTIN Fisher CNP  Questions or concerns please feel free to send me a [employer] message or call me  Phone : [phone]

## 2023-04-13 ENCOUNTER — ANESTHESIA EVENT (OUTPATIENT)
Dept: SURGERY | Facility: CLINIC | Age: 28
End: 2023-04-13
Payer: COMMERCIAL

## 2023-04-14 ENCOUNTER — ANESTHESIA (OUTPATIENT)
Dept: SURGERY | Facility: CLINIC | Age: 28
End: 2023-04-14
Payer: COMMERCIAL

## 2023-04-14 ENCOUNTER — HOSPITAL ENCOUNTER (OUTPATIENT)
Facility: CLINIC | Age: 28
Discharge: HOME | End: 2023-04-14
Attending: PLASTIC SURGERY | Admitting: PLASTIC SURGERY
Payer: COMMERCIAL

## 2023-04-14 VITALS
RESPIRATION RATE: 18 BRPM | DIASTOLIC BLOOD PRESSURE: 62 MMHG | BODY MASS INDEX: 36.18 KG/M2 | WEIGHT: 196.6 LBS | HEIGHT: 62 IN | HEART RATE: 84 BPM | OXYGEN SATURATION: 95 % | TEMPERATURE: 97.3 F | SYSTOLIC BLOOD PRESSURE: 109 MMHG

## 2023-04-14 DIAGNOSIS — E65 ABDOMINAL PANNUS: Primary | ICD-10-CM

## 2023-04-14 DIAGNOSIS — N62 MACROMASTIA: ICD-10-CM

## 2023-04-14 DIAGNOSIS — E03.9 HYPOTHYROIDISM, UNSPECIFIED TYPE: ICD-10-CM

## 2023-04-14 LAB — HCG UR QL: NEGATIVE

## 2023-04-14 PROCEDURE — 250N000011 HC RX IP 250 OP 636: Performed by: NURSE ANESTHETIST, CERTIFIED REGISTERED

## 2023-04-14 PROCEDURE — 250N000011 HC RX IP 250 OP 636

## 2023-04-14 PROCEDURE — 258N000003 HC RX IP 258 OP 636: Performed by: PLASTIC SURGERY

## 2023-04-14 PROCEDURE — 370N000017 HC ANESTHESIA TECHNICAL FEE, PER MIN: Performed by: PLASTIC SURGERY

## 2023-04-14 PROCEDURE — 250N000009 HC RX 250: Performed by: NURSE ANESTHETIST, CERTIFIED REGISTERED

## 2023-04-14 PROCEDURE — 250N000011 HC RX IP 250 OP 636: Performed by: PLASTIC SURGERY

## 2023-04-14 PROCEDURE — 999N000141 HC STATISTIC PRE-PROCEDURE NURSING ASSESSMENT: Performed by: PLASTIC SURGERY

## 2023-04-14 PROCEDURE — 360N000076 HC SURGERY LEVEL 3, PER MIN: Performed by: PLASTIC SURGERY

## 2023-04-14 PROCEDURE — 88305 TISSUE EXAM BY PATHOLOGIST: CPT | Mod: TC | Performed by: PLASTIC SURGERY

## 2023-04-14 PROCEDURE — 250N000013 HC RX MED GY IP 250 OP 250 PS 637: Performed by: ANESTHESIOLOGY

## 2023-04-14 PROCEDURE — 88305 TISSUE EXAM BY PATHOLOGIST: CPT | Mod: 26 | Performed by: STUDENT IN AN ORGANIZED HEALTH CARE EDUCATION/TRAINING PROGRAM

## 2023-04-14 PROCEDURE — 272N000001 HC OR GENERAL SUPPLY STERILE: Performed by: PLASTIC SURGERY

## 2023-04-14 PROCEDURE — 258N000003 HC RX IP 258 OP 636: Performed by: NURSE ANESTHETIST, CERTIFIED REGISTERED

## 2023-04-14 PROCEDURE — 81025 URINE PREGNANCY TEST: CPT | Performed by: PLASTIC SURGERY

## 2023-04-14 PROCEDURE — 710N000012 HC RECOVERY PHASE 2, PER MINUTE: Performed by: PLASTIC SURGERY

## 2023-04-14 PROCEDURE — 250N000013 HC RX MED GY IP 250 OP 250 PS 637

## 2023-04-14 PROCEDURE — 710N000009 HC RECOVERY PHASE 1, LEVEL 1, PER MIN: Performed by: PLASTIC SURGERY

## 2023-04-14 PROCEDURE — 250N000009 HC RX 250: Performed by: PLASTIC SURGERY

## 2023-04-14 PROCEDURE — 250N000011 HC RX IP 250 OP 636: Performed by: ANESTHESIOLOGY

## 2023-04-14 PROCEDURE — 250N000025 HC SEVOFLURANE, PER MIN: Performed by: PLASTIC SURGERY

## 2023-04-14 RX ORDER — LIDOCAINE HYDROCHLORIDE 20 MG/ML
INJECTION, SOLUTION INFILTRATION; PERINEURAL PRN
Status: DISCONTINUED | OUTPATIENT
Start: 2023-04-14 | End: 2023-04-14

## 2023-04-14 RX ORDER — DEXMEDETOMIDINE HYDROCHLORIDE 4 UG/ML
INJECTION, SOLUTION INTRAVENOUS PRN
Status: DISCONTINUED | OUTPATIENT
Start: 2023-04-14 | End: 2023-04-14

## 2023-04-14 RX ORDER — ONDANSETRON 2 MG/ML
4 INJECTION INTRAMUSCULAR; INTRAVENOUS EVERY 30 MIN PRN
Status: DISCONTINUED | OUTPATIENT
Start: 2023-04-14 | End: 2023-04-14 | Stop reason: HOSPADM

## 2023-04-14 RX ORDER — BUPIVACAINE HYDROCHLORIDE 2.5 MG/ML
INJECTION, SOLUTION INFILTRATION; PERINEURAL PRN
Status: DISCONTINUED | OUTPATIENT
Start: 2023-04-14 | End: 2023-04-14 | Stop reason: HOSPADM

## 2023-04-14 RX ORDER — ONDANSETRON 2 MG/ML
INJECTION INTRAMUSCULAR; INTRAVENOUS PRN
Status: DISCONTINUED | OUTPATIENT
Start: 2023-04-14 | End: 2023-04-14

## 2023-04-14 RX ORDER — PROPOFOL 10 MG/ML
INJECTION, EMULSION INTRAVENOUS CONTINUOUS PRN
Status: DISCONTINUED | OUTPATIENT
Start: 2023-04-14 | End: 2023-04-14

## 2023-04-14 RX ORDER — DEXAMETHASONE SODIUM PHOSPHATE 4 MG/ML
INJECTION, SOLUTION INTRA-ARTICULAR; INTRALESIONAL; INTRAMUSCULAR; INTRAVENOUS; SOFT TISSUE PRN
Status: DISCONTINUED | OUTPATIENT
Start: 2023-04-14 | End: 2023-04-14

## 2023-04-14 RX ORDER — PROPOFOL 10 MG/ML
INJECTION, EMULSION INTRAVENOUS PRN
Status: DISCONTINUED | OUTPATIENT
Start: 2023-04-14 | End: 2023-04-14

## 2023-04-14 RX ORDER — VECURONIUM BROMIDE 1 MG/ML
INJECTION, POWDER, LYOPHILIZED, FOR SOLUTION INTRAVENOUS PRN
Status: DISCONTINUED | OUTPATIENT
Start: 2023-04-14 | End: 2023-04-14

## 2023-04-14 RX ORDER — HYDROCODONE BITARTRATE AND ACETAMINOPHEN 5; 325 MG/1; MG/1
2 TABLET ORAL ONCE
Status: COMPLETED | OUTPATIENT
Start: 2023-04-14 | End: 2023-04-14

## 2023-04-14 RX ORDER — HYDROCODONE BITARTRATE AND ACETAMINOPHEN 5; 325 MG/1; MG/1
1-2 TABLET ORAL EVERY 4 HOURS PRN
Qty: 20 TABLET | Refills: 0 | Status: SHIPPED | OUTPATIENT
Start: 2023-04-14 | End: 2023-04-17

## 2023-04-14 RX ORDER — ACETAMINOPHEN 500 MG
1000 TABLET ORAL ONCE
Status: COMPLETED | OUTPATIENT
Start: 2023-04-14 | End: 2023-04-14

## 2023-04-14 RX ORDER — MAGNESIUM HYDROXIDE 1200 MG/15ML
LIQUID ORAL PRN
Status: DISCONTINUED | OUTPATIENT
Start: 2023-04-14 | End: 2023-04-14 | Stop reason: HOSPADM

## 2023-04-14 RX ORDER — ONDANSETRON 4 MG/1
4 TABLET, ORALLY DISINTEGRATING ORAL EVERY 30 MIN PRN
Status: DISCONTINUED | OUTPATIENT
Start: 2023-04-14 | End: 2023-04-14 | Stop reason: HOSPADM

## 2023-04-14 RX ORDER — CEFAZOLIN SODIUM/WATER 2 G/20 ML
2 SYRINGE (ML) INTRAVENOUS SEE ADMIN INSTRUCTIONS
Status: DISCONTINUED | OUTPATIENT
Start: 2023-04-14 | End: 2023-04-14 | Stop reason: HOSPADM

## 2023-04-14 RX ORDER — HYDROMORPHONE HCL IN WATER/PF 6 MG/30 ML
0.2 PATIENT CONTROLLED ANALGESIA SYRINGE INTRAVENOUS EVERY 5 MIN PRN
Status: DISCONTINUED | OUTPATIENT
Start: 2023-04-14 | End: 2023-04-14 | Stop reason: HOSPADM

## 2023-04-14 RX ORDER — OXYCODONE HYDROCHLORIDE 5 MG/1
5 TABLET ORAL
Status: DISCONTINUED | OUTPATIENT
Start: 2023-04-14 | End: 2023-04-14 | Stop reason: HOSPADM

## 2023-04-14 RX ORDER — HEPARIN SODIUM 5000 [USP'U]/.5ML
5000 INJECTION, SOLUTION INTRAVENOUS; SUBCUTANEOUS
Status: COMPLETED | OUTPATIENT
Start: 2023-04-14 | End: 2023-04-14

## 2023-04-14 RX ORDER — SODIUM CHLORIDE, SODIUM LACTATE, POTASSIUM CHLORIDE, CALCIUM CHLORIDE 600; 310; 30; 20 MG/100ML; MG/100ML; MG/100ML; MG/100ML
INJECTION, SOLUTION INTRAVENOUS CONTINUOUS
Status: DISCONTINUED | OUTPATIENT
Start: 2023-04-14 | End: 2023-04-14 | Stop reason: HOSPADM

## 2023-04-14 RX ORDER — GLYCOPYRROLATE 0.2 MG/ML
INJECTION, SOLUTION INTRAMUSCULAR; INTRAVENOUS PRN
Status: DISCONTINUED | OUTPATIENT
Start: 2023-04-14 | End: 2023-04-14

## 2023-04-14 RX ORDER — HYDROMORPHONE HCL IN WATER/PF 6 MG/30 ML
0.4 PATIENT CONTROLLED ANALGESIA SYRINGE INTRAVENOUS EVERY 5 MIN PRN
Status: DISCONTINUED | OUTPATIENT
Start: 2023-04-14 | End: 2023-04-14 | Stop reason: HOSPADM

## 2023-04-14 RX ORDER — OXYCODONE HYDROCHLORIDE 5 MG/1
10 TABLET ORAL
Status: DISCONTINUED | OUTPATIENT
Start: 2023-04-14 | End: 2023-04-14 | Stop reason: HOSPADM

## 2023-04-14 RX ORDER — NEOSTIGMINE METHYLSULFATE 1 MG/ML
VIAL (ML) INJECTION PRN
Status: DISCONTINUED | OUTPATIENT
Start: 2023-04-14 | End: 2023-04-14

## 2023-04-14 RX ORDER — SODIUM CHLORIDE, SODIUM LACTATE, POTASSIUM CHLORIDE, CALCIUM CHLORIDE 600; 310; 30; 20 MG/100ML; MG/100ML; MG/100ML; MG/100ML
INJECTION, SOLUTION INTRAVENOUS CONTINUOUS PRN
Status: DISCONTINUED | OUTPATIENT
Start: 2023-04-14 | End: 2023-04-14

## 2023-04-14 RX ORDER — CEFAZOLIN SODIUM/WATER 2 G/20 ML
2 SYRINGE (ML) INTRAVENOUS
Status: COMPLETED | OUTPATIENT
Start: 2023-04-14 | End: 2023-04-14

## 2023-04-14 RX ORDER — KETAMINE HYDROCHLORIDE 10 MG/ML
INJECTION INTRAMUSCULAR; INTRAVENOUS PRN
Status: DISCONTINUED | OUTPATIENT
Start: 2023-04-14 | End: 2023-04-14

## 2023-04-14 RX ADMIN — LIDOCAINE HYDROCHLORIDE 80 MG: 20 INJECTION, SOLUTION INFILTRATION; PERINEURAL at 07:33

## 2023-04-14 RX ADMIN — PROPOFOL 100 MCG/KG/MIN: 10 INJECTION, EMULSION INTRAVENOUS at 07:34

## 2023-04-14 RX ADMIN — VECURONIUM BROMIDE 2 MG: 1 INJECTION, POWDER, LYOPHILIZED, FOR SOLUTION INTRAVENOUS at 10:30

## 2023-04-14 RX ADMIN — PROPOFOL 200 MG: 10 INJECTION, EMULSION INTRAVENOUS at 07:33

## 2023-04-14 RX ADMIN — HYDROMORPHONE HYDROCHLORIDE 0.5 MG: 1 INJECTION, SOLUTION INTRAMUSCULAR; INTRAVENOUS; SUBCUTANEOUS at 10:06

## 2023-04-14 RX ADMIN — ACETAMINOPHEN 1000 MG: 500 TABLET ORAL at 06:22

## 2023-04-14 RX ADMIN — PROPOFOL 50 MG: 10 INJECTION, EMULSION INTRAVENOUS at 07:35

## 2023-04-14 RX ADMIN — SODIUM CHLORIDE, POTASSIUM CHLORIDE, SODIUM LACTATE AND CALCIUM CHLORIDE: 600; 310; 30; 20 INJECTION, SOLUTION INTRAVENOUS at 09:58

## 2023-04-14 RX ADMIN — NEOSTIGMINE METHYLSULFATE 5 MG: 1 INJECTION, SOLUTION INTRAVENOUS at 12:22

## 2023-04-14 RX ADMIN — VECURONIUM BROMIDE 6 MG: 1 INJECTION, POWDER, LYOPHILIZED, FOR SOLUTION INTRAVENOUS at 09:45

## 2023-04-14 RX ADMIN — Medication 2 G: at 11:27

## 2023-04-14 RX ADMIN — GLYCOPYRROLATE 0.8 MG: 0.2 INJECTION, SOLUTION INTRAMUSCULAR; INTRAVENOUS at 12:22

## 2023-04-14 RX ADMIN — HYDROMORPHONE HYDROCHLORIDE 0.5 MG: 1 INJECTION, SOLUTION INTRAMUSCULAR; INTRAVENOUS; SUBCUTANEOUS at 07:39

## 2023-04-14 RX ADMIN — VECURONIUM BROMIDE 2 MG: 1 INJECTION, POWDER, LYOPHILIZED, FOR SOLUTION INTRAVENOUS at 11:21

## 2023-04-14 RX ADMIN — HYDROMORPHONE HYDROCHLORIDE 0.4 MG: 0.2 INJECTION, SOLUTION INTRAMUSCULAR; INTRAVENOUS; SUBCUTANEOUS at 12:37

## 2023-04-14 RX ADMIN — Medication 10 MG: at 10:05

## 2023-04-14 RX ADMIN — DEXAMETHASONE SODIUM PHOSPHATE 4 MG: 4 INJECTION, SOLUTION INTRA-ARTICULAR; INTRALESIONAL; INTRAMUSCULAR; INTRAVENOUS; SOFT TISSUE at 07:37

## 2023-04-14 RX ADMIN — DEXMEDETOMIDINE HYDROCHLORIDE 8 MCG: 200 INJECTION INTRAVENOUS at 08:22

## 2023-04-14 RX ADMIN — HEPARIN SODIUM 5000 UNITS: 5000 INJECTION, SOLUTION INTRAVENOUS; SUBCUTANEOUS at 06:22

## 2023-04-14 RX ADMIN — ROCURONIUM BROMIDE 50 MG: 50 INJECTION, SOLUTION INTRAVENOUS at 07:34

## 2023-04-14 RX ADMIN — HYDROMORPHONE HYDROCHLORIDE 0.4 MG: 0.2 INJECTION, SOLUTION INTRAMUSCULAR; INTRAVENOUS; SUBCUTANEOUS at 13:23

## 2023-04-14 RX ADMIN — VECURONIUM BROMIDE 4 MG: 1 INJECTION, POWDER, LYOPHILIZED, FOR SOLUTION INTRAVENOUS at 08:00

## 2023-04-14 RX ADMIN — VECURONIUM BROMIDE 6 MG: 1 INJECTION, POWDER, LYOPHILIZED, FOR SOLUTION INTRAVENOUS at 08:30

## 2023-04-14 RX ADMIN — HYDROCODONE BITARTRATE AND ACETAMINOPHEN 2 TABLET: 5; 325 TABLET ORAL at 13:35

## 2023-04-14 RX ADMIN — DEXMEDETOMIDINE HYDROCHLORIDE 12 MCG: 200 INJECTION INTRAVENOUS at 07:59

## 2023-04-14 RX ADMIN — ONDANSETRON 4 MG: 2 INJECTION INTRAMUSCULAR; INTRAVENOUS at 11:41

## 2023-04-14 RX ADMIN — Medication 30 MG: at 07:34

## 2023-04-14 RX ADMIN — Medication 2 G: at 07:30

## 2023-04-14 RX ADMIN — MIDAZOLAM 2 MG: 1 INJECTION INTRAMUSCULAR; INTRAVENOUS at 07:31

## 2023-04-14 RX ADMIN — SODIUM CHLORIDE, POTASSIUM CHLORIDE, SODIUM LACTATE AND CALCIUM CHLORIDE: 600; 310; 30; 20 INJECTION, SOLUTION INTRAVENOUS at 07:30

## 2023-04-14 RX ADMIN — HYDROMORPHONE HYDROCHLORIDE 0.4 MG: 0.2 INJECTION, SOLUTION INTRAMUSCULAR; INTRAVENOUS; SUBCUTANEOUS at 12:55

## 2023-04-14 ASSESSMENT — ACTIVITIES OF DAILY LIVING (ADL)
ADLS_ACUITY_SCORE: 18

## 2023-04-14 ASSESSMENT — ENCOUNTER SYMPTOMS: SEIZURES: 0

## 2023-04-14 ASSESSMENT — LIFESTYLE VARIABLES: TOBACCO_USE: 0

## 2023-04-14 NOTE — DISCHARGE INSTRUCTIONS
Same Day Surgery Discharge Instructions for  Sedation and General Anesthesia     It's not unusual to feel dizzy, light-headed or faint for up to 24 hours after surgery or while taking pain medication.  If you have these symptoms: sit for a few minutes before standing and have someone assist you when you get up to walk or use the bathroom.    You should rest and relax for the next 24 hours. We recommend you make arrangements to have an adult stay with you for at least 24 hours after your discharge.  Avoid hazardous and strenuous activity.    DO NOT DRIVE any vehicle or operate mechanical equipment for 24 hours following the end of your surgery.  Even though you may feel normal, your reactions may be affected by the medication you have received.    Do not drink alcoholic beverages for 24 hours following surgery.     Slowly progress to your regular diet as you feel able. It's not unusual to feel nauseated and/or vomit after receiving anesthesia.  If you develop these symptoms, drink clear liquids (apple juice, ginger ale, broth, 7-up, etc. ) until you feel better.  If your nausea and vomiting persists for 24 hours, please notify your surgeon.      All narcotic pain medications, along with inactivity and anesthesia, can cause constipation. Drinking plenty of liquids and increasing fiber intake will help.    For any questions of a medical nature, call your surgeon.    Do not make important decisions for 24 hours.    If you had general anesthesia, you may have a sore throat for a couple of days related to the breathing tube used during surgery.  You may use Cepacol lozenges to help with this discomfort.  If it worsens or if you develop a fever, contact your surgeon.     If you feel your pain is not well managed with the pain medications prescribed by your surgeon, please contact your surgeon's office to let them know so they can address your concerns.      Discharge Instructions following Breast Surgery  [facility]    Diet:   Resume diet as tolerated.  Drink plenty of fluids to prevent constipation.    Activity:   Gentle rotation & stretching of your arms/shoulders will prevent stiffness in joints   Increase activity gradually   No heavy lifting greater than 10-15 pounds & no strenuous activity until  approved by surgeon    Bathing/Incision Care:   You may shower as directed by surgeon   Pat incisions dry.  No lotions, powders or perfumes to incisions   Tape dressings (steri strips) will fall off in 7-10 days (if present)    What to expect:   A tingly or itchy sensation around the incision is a normal sign of healing   Some clear, pink drainage from incisions is normal.      Notify your surgeon for the following signs & symptoms:   Redness, warmth, or swelling of the incision    Foul smelling or increased drainage   Chills or temperature greater than 101 F   Pain not controlled by pain medications     Discharge Instructions Following Abdominoplasty    Once home, follow any instructions you are given. Your doctor will tell you when you can return to your normal routine. During your recovery:  Take any prescribed medications as directed.  Care for your incisions and the dressing (bandage) over them as instructed by your doctor.  Avoid strenuous activity and exercise as directed.   Wear compression garments if directed by your surgeon  Don t drive until you are no longer taking prescription pain medication and your doctor says it s okay.   Follow-Up       Follow up with your surgeon as directed. Let your doctor know if you have any questions or concerns.  Erasmo Osullivan Drain  Home Care Instructions    What is a Erasmo Osullivan (PILAR) Drain?  This is a small tube that connects to a bulb.  Its gentle suction removes extra fluid from a surgical wound.  Your doctor will remove the tube when the amount of fluid decreases.  The color and amount of fluid varies.  Right after surgery the fluid is bright red.  Over  time, it changes to light pink and may become clear or the color of straw.    How should I care for my tube site?  Keep the skin around the tube dry.  Check with your doctor about how to shower.  You may need to cover the site with plastic when you shower.  Or, it may be okay to let the site get wet and put on a clean bandage after you shower.    If the bandage gets wet, you will need to change it.  How should I care for the bulb?  Keep the bulb compressed at all times except while you empty it.   Attach the bulb to your clothing with tape and a safety pin.  Try to empty the bulb at the same time every day.  Empty the bulb at least once a day, or when the bulb becomes half full.  If it becomes too full, there will not be enough suction.    To empty the bulb:  Wash your hands.  Open the bulb cap.  Drain the fluid from the bulb into the measuring cup.  If you have two drains, use two cups.    Clean the mouth of the bulb with an alcohol wipe if your nurse told you to.  Squeeze the bulb (fold it in half before you close the bulb cap) If it does not stay compressed, call your nurse or clinic.  Write the amount of drainage on the drainage record (see back page).  If you have two drains, write the amount for each bulb.  Flush the drainage down the toilet.  Rinse the measuring cup.  Wash your hands.    When should I call my doctor?   Call your doctor if:  You have a fever over 101 F (38.3 C), taken under the tongue.   The drainage increases or smells bad.  The skin around your tube has increased redness, swelling, warmth or pain.  You have pus or fluid leaking at the tube site.  Your stitches break.  You think the tube is not draining.  The tube falls out.  You have any problems or concerns.    Your drainage record:    Empty your bulb at least once per day or when 1/2 to 1/3 full.  Write down the date, time and amount of drainage in each bulb.   Bring this record to each clinic visit.    Date Time Bulb 1: amount  of  Drainage in (ml or cc) Bulb 2: amount of drainage (in ml or cc) Notes                                                         Today you were given 975 mg of Tylenol at 6:22 am . The recommended daily maximum dose is 4000 mg.    **If you have questions or concerns about your procedure,  call Dr. Albarado at [phone]**

## 2023-04-14 NOTE — OP NOTE
PLASTIC SURGERY OPERATIVE NOTE  Patient Name:  Zelda Paulson     DATE of SERVICE:  4/14/2023     PREOPERATIVE DIAGNOSES:   1.  Abdominal pannus    POSTOPERATIVE DIAGNOSES:    1.  Abdominal pannus    SURGEON:  Aline Albarado MD   ASSISTANT:  Estela Perales PA-C    MEDICAL NECESSITY: The first assistant was medically necessary to assist with patient positioning, exposure and visualization of anatomic structures, assistance with hemostasis and suture closure of incisions.    OR STAFF:Circulator: Daisy Rock RN; Carmen Guaman RN  Relief Scrub: Mame Hernandez RN  Scrub Person: Silva Barker     ANESTHESIA:  General     ESTIMATED BLOOD LOSS:  * No blood loss amount entered *      PROCEDURES:   1.  abdominoplasty     DESCRIPTION OF PROCEDURE:  Zelda Paulson  was marked for incisions and taken to the operating room.  General anesthesia was administered.  The abdominal area was prepped and draped in a sterile manner.  An incision was made along the lower abdomen following the preoperative markings and dissection was carried down to the underlying abdominal wall.  Dissection was carried cephalad until the costal margin and xiphoid area were reached.  The umbilicus was sharply freed from surrounding skin and adipose tissue.  There was a significant diastasis present with a maximum width of 11 cm.  This was repaired along the midline using interrupted 0 Nurolon followed by a running 0 PDS suture.  The repair was interrupted around the umbilicus.  Hemostasis was achieved.  0.25% Marcaine with epinephrine was placed within the rectus sheath.      Redundant adipose tissue from the mons area was then excised.  Hemostasis was achieved.  The patient was brought to the upright position and the excess skin along the lower abdomen was marked and excised. Excess adipose tissue from below the superficial fascia was also removed.  Hemostasis was achieved.  Two 15 American PILAR drains were placed.  The abdominal incision  was reapproximated with interrupted 0 PDS in the superficial fascia.  This layer was tacked to the underlying abdominal wall.  The skin incision was closed with interrupted 3-0 Monocryl in the subcutaneous tissue.  Epinephrine solution was then infiltrated into the mons area, the epigastric area, bilateral axillary area and lateral to the abdominal incision on each side.  Standard liposuction was completed with a 3 mm cannula.  The abdominal incision was then closed with a running 4-0 subcuticular Monocryl suture.      The new position for the umbilicus was marked.  A circular incision was made and the excess skin and adipose tissue was excised.  The umbilicus was retrieved and then matured using interrupted 4-0 Monocryl in the subcutaneous tissue followed an interrupted 5-0 nylon.  Xeroform followed by light dressing was applied.  Abdominal binder was placed.      FINDINGS:  The abdominal pannus weighed 2720 grams.  There was 750 cc of epinephrine solution infiltrated and 900 cc lipo aspirate obtained.      MD Per [facility]   [phone]

## 2023-04-14 NOTE — ANESTHESIA CARE TRANSFER NOTE
Patient: Zelda Paulson    Procedure: Procedure(s):  BILATERAL REDUCTION MAMMOPLASTY  ABDOMINOPLASTY       Diagnosis: Macromastia [N62]  Diagnosis Additional Information: No value filed.    Anesthesia Type:   General     Note:    Oropharynx: oropharynx clear of all foreign objects and spontaneously breathing  Level of Consciousness: awake  Oxygen Supplementation: room air    Independent Airway: airway patency satisfactory and stable  Dentition: dentition unchanged  Vital Signs Stable: post-procedure vital signs reviewed and stable  Report to RN Given: handoff report given  Patient transferred to: PACU    Handoff Report: Identifed the Patient, Identified the Reponsible Provider, Reviewed the pertinent medical history, Discussed the surgical course, Reviewed Intra-OP anesthesia mangement and issues during anesthesia, Set expectations for post-procedure period and Allowed opportunity for questions and acknowledgement of understanding      Vitals:  Vitals Value Taken Time   /63 04/14/23 1231   Temp     Pulse 101 04/14/23 1233   Resp 10 04/14/23 1233   SpO2 98 % 04/14/23 1233   Vitals shown include unvalidated device data.    Electronically Signed By: QUINTIN Daigle CRNA  April 14, 2023  12:33 PM

## 2023-04-14 NOTE — ANESTHESIA POSTPROCEDURE EVALUATION
Patient: Zelda Paulson    Procedure: Procedure(s):  BILATERAL REDUCTION MAMMOPLASTY  ABDOMINOPLASTY       Anesthesia Type:  General    Note:     Postop Pain Control: Uneventful            Sign Out: Well controlled pain   PONV: No   Neuro/Psych: Uneventful            Sign Out: Acceptable/Baseline neuro status   Airway/Respiratory: Uneventful            Sign Out: Acceptable/Baseline resp. status   CV/Hemodynamics: Uneventful            Sign Out: Acceptable CV status; No obvious hypovolemia; No obvious fluid overload   Other NRE: NONE   DID A NON-ROUTINE EVENT OCCUR? No           Last vitals:  Vitals Value Taken Time   /52 04/14/23 1330   Temp 36.3  C (97.4  F) 04/14/23 1330   Pulse 112 04/14/23 1342   Resp 16 04/14/23 1342   SpO2 97 % 04/14/23 1343   Vitals shown include unvalidated device data.    Electronically Signed By: Neil Pastor MD  April 14, 2023  2:11 PM

## 2023-04-14 NOTE — ANESTHESIA PREPROCEDURE EVALUATION
Anesthesia Pre-Procedure Evaluation    Patient: Zelda Paulson   MRN: [id number] : 1995        Procedure : Procedure(s):  BILATERAL REDUCTION MAMMOPLASTY  ABDOMINOPLASTY          Past Medical History:   Diagnosis Date     Chronic kidney disease     kidney stones     Hypertension, unspecified type 2021     Hypothyroidism      Iron deficiency      Obesity      PCOS (polycystic ovarian syndrome)      Thyroid disease      Uncomplicated asthma       Past Surgical History:   Procedure Laterality Date     APPENDECTOMY       BREAST SURGERY      breast lumpectomy      SECTION N/A 2018    Procedure:  SECTION;;  Surgeon: Lela Looney MD;  Location:  L+D      SECTION  2018      SECTION N/A 2021    Procedure: REPEAT  SECTION;  Surgeon: Angie Rankin MD;  Location:  L+D     ENT SURGERY      tonsillectomy     LAPAROSCOPIC CHOLECYSTECTOMY WITH CHOLANGIOGRAMS N/A 2019    Procedure: LAPAROSCOPIC CHOLECYSTECTOMY WITH POSSIBLE INTRAOPERATIVE CHOLANGIOGRAMS;  Surgeon: Valentín Butler MD;  Location:  OR      Allergies   Allergen Reactions     Fentanyl Anaphylaxis     itching     Morphine Anaphylaxis     Sulfa Drugs Rash      Social History     Tobacco Use     Smoking status: Never     Smokeless tobacco: Never   Vaping Use     Vaping status: Never Used   Substance Use Topics     Alcohol use: Not Currently     Comment: rare      Wt Readings from Last 1 Encounters:   23 89.2 kg (196 lb 9.6 oz)        Anesthesia Evaluation   Pt has had prior anesthetic.     No history of anesthetic complications       ROS/MED HX  ENT/Pulmonary:     (+) asthma  (-) tobacco use and sleep apnea   Neurologic:    (-) no seizures and no CVA   Cardiovascular:     (+) hypertension-----    METS/Exercise Tolerance:     Hematologic:       Musculoskeletal:       GI/Hepatic:    (-) GERD and liver disease   Renal/Genitourinary:     (+) renal  disease,     Endo: Comment: PCOS    (+) thyroid problem, hypothyroidism, Obesity,  (-) Type II DM   Psychiatric/Substance Use:       Infectious Disease:       Malignancy:       Other:            Physical Exam    Airway        Mallampati: II   TM distance: > 3 FB   Neck ROM: full   Mouth opening: > 3 cm    Respiratory Devices and Support         Dental       (+) Minor Abnormalities - some fillings, tiny chips      Cardiovascular   cardiovascular exam normal          Pulmonary   pulmonary exam normal                OUTSIDE LABS:  CBC:   Lab Results   Component Value Date    WBC 7.9 03/29/2023    WBC 6.7 01/30/2023    HGB 13.0 03/29/2023    HGB 12.7 01/30/2023    HCT 41.5 03/29/2023    HCT 40.6 01/30/2023     03/29/2023     01/30/2023     BMP:   Lab Results   Component Value Date     03/29/2023     10/20/2021    POTASSIUM 4.6 03/29/2023    POTASSIUM 3.8 10/20/2021    CHLORIDE 106 03/29/2023    CHLORIDE 108 10/20/2021    CO2 27 03/29/2023    CO2 28 10/20/2021    BUN 9.9 03/29/2023    BUN 13 10/20/2021    CR 0.67 03/29/2023    CR 0.76 10/20/2021    GLC 83 03/29/2023    GLC 71 10/20/2021     COAGS:   Lab Results   Component Value Date    INR 0.98 04/10/2021    FIBR 459 (H) 04/10/2021     POC:   Lab Results   Component Value Date    HCG Negative 04/14/2023     HEPATIC:   Lab Results   Component Value Date    ALBUMIN 4.1 03/29/2023    PROTTOTAL 7.0 03/29/2023    ALT 28 03/29/2023    AST 19 03/29/2023    ALKPHOS 69 03/29/2023    BILITOTAL 0.3 03/29/2023     OTHER:   Lab Results   Component Value Date    LACT 1.1 08/08/2017    A1C 5.2 10/20/2021    SHONNA 8.8 03/29/2023    LIPASE 152 01/06/2019    TSH 2.76 03/29/2023    T4 0.86 07/27/2022       Anesthesia Plan    ASA Status:  2   NPO Status:  NPO Appropriate    Anesthesia Type: General.     - Airway: ETT   Induction: Intravenous.   Maintenance: Balanced.   Techniques and Equipment:     - Airway: Video-Laryngoscope         Consents    Anesthesia Plan(s)  and associated risks, benefits, and realistic alternatives discussed. Questions answered and patient/representative(s) expressed understanding.    - Discussed:     - Discussed with:  Patient, Parent (Mother and/or Father)         Postoperative Care    Pain management: Multi-modal analgesia.   PONV prophylaxis: Ondansetron (or other 5HT-3), Dexamethasone or Solumedrol, Background Propofol Infusion     Comments:                Neil Pastor MD

## 2023-04-14 NOTE — ANESTHESIA PROCEDURE NOTES
Airway       Patient location during procedure: OR ([facility] - Operating Room or Procedural Area)       Procedure Start/Stop Times: 4/14/2023 7:36 AM  Staff -        Anesthesiologist:  Neil Pastor MD       CRNA: Rayna Lewis APRN CRNA       Performed By: CRNAIndications and Patient Condition       Indications for airway management: luis-procedural       Induction type:intravenous       Mask difficulty assessment: 1 - vent by mask    Final Airway Details       Final airway type: endotracheal airway       Successful airway: ETT - single  Endotracheal Airway Details        ETT size (mm): 7.0       Cuffed: yes       Successful intubation technique: video laryngoscopy       VL Blade Size: Hoffmann 3       Grade View of Cords: 1       Adjucts: stylet       Position: Right       Measured from: gums/teeth       Secured at (cm): 22       Bite block used: None    Post intubation assessment        Placement verified by: capnometry, equal breath sounds and chest rise        Number of attempts at approach: 1       Number of other approaches attempted: 0       Secured with: pink tape       Ease of procedure: easy       Dentition: Intact and Unchanged    Medication(s) Administered   Medication Administration Time: 4/14/2023 7:36 AM

## 2023-04-14 NOTE — OP NOTE
PLASTIC SURGERY OPERATIVE NOTE  Patient Name:  Zelda Paulson     Date of Service:  4/14/2023     PREOPERATIVE DIAGNOSES:   1.  Macromastia [N62]      POSTOPERATIVE DIAGNOSES:   1.  Macromastia [N62]     SURGEON:  Aline Albarado MD     ASSISTANT:  Estela Perales PA-C    MEDICAL NECESSITY: The first assistant was medically necessary to assist with patient positioning, exposure and visualization of anatomic structures, assistance with hemostasis and suture closure of incisions.    OR STAFF:  Circulator: Daisy Rock RN; Carmen Guaman RN  Relief Scrub: Mame Hernandez RN  Scrub Person: Silva Barker     Anesthesia:  General     Estimated blood loss:  30 mL    SPECIMEN(S):    ID Type Source Tests Collected by Time Destination   1 : LEFT BREAST TISSUE Tissue Breast, Left SURGICAL PATHOLOGY EXAM Aline Albarado MD 4/14/2023  8:28 AM    2 : RIGHT BREAST TISSUE Tissue Breast, Right SURGICAL PATHOLOGY EXAM Aline Albarado MD 4/14/2023  8:29 AM         INDICATIONS:  Patient is a 28 year old female with persistent upper back and neck pain due to the weight of her breasts. She has been approved for a medically necessary breast reduction procedure.     PROCEDURE:  Bilateral breast reduction     DESCRIPTION OF PROCEDURE:  Zelda Paulson was marked for incision and taken to the operating room.  General anesthesia was administered.  The chest area was prepped and draped in a sterile manner.  On the left side, the nipple areolar complex was resized to 42 mm and medial pedicle was designed with an 7 cm base.  This area was de-epithelialized with the breast under tension.  The medial pedicle was better defined. Remaining skin incisions were made.  Excess breast tissue was removed from the inferior and lateral aspects of the breast.  The breast was reapproximated placing 3-0 monocryl in the subcutaneous tissue 3 cm from the apex.  The medial and lateral pillars were then reapproximated interrupted 2-0  PDS suture. Hemostasis was achieved.      The patient was brought to the upright position and excess skin along the inframammary fold was marked. The new nipple areola complex position was marked.  The patient was return to the supine position.     On the left side, the excess skin along the inframammary fold was sharply excised and hemostasis was achieved.  The incision was reapproximated with interrupted 3-0 Monocryl in the subcutaneous tissue followed by a running 4-0 subcuticular Monocryl suture.  The nipple areola complex was inset into a 38 mm opening.  The nipple areola complex was matured using interrupted 4-0 monocryl followed a running 5-0 monocryl suture.    On the right side, the nipple areolar complex was resized to 42 mm and medial pedicle was designed with an 7 cm base.  This area was de-epithelialized with the breast under tension.  Remaining skin incisions were made.  The medial pedicle was better defined. Remaining skin incisions were made.  Excess breast tissue was removed from the inferior and lateral aspects of the breast.  The breast was reapproximated placing 3-0 monocryl in the subcutaneous tissue 3 cm from the apex.  The medial and lateral pillars were then reapproximated interrupted 2-0 PDS suture. Hemostasis was achieved.      The patient was brought to the upright position and excess skin along the inframammary fold was marked. The new nipple areola complex position was marked.  The patient was return to the supine position.     On the right side, the excess skin along the inframammary fold was sharply excised and hemostasis was achieved.  The incision was reapproximated with interrupted 3-0 Monocryl in the subcutaneous tissue followed by a running 4-0 subcuticular Monocryl suture.  The nipple areola complex was inset into a 38 mm opening.  The nipple areola complex was matured using interrupted 4-0 monocryl followed by arunning 5-0 monocryl suture.    The incisions were dressing with  xeroform and a light gauze and secured with an ace wrap.     FINDINGS: Breast tissue from the left breast weighed 390 grams and from the right breast 475 grams.      MD Per [facility]   [phone]

## 2023-04-18 LAB
PATH REPORT.COMMENTS IMP SPEC: NORMAL
PATH REPORT.COMMENTS IMP SPEC: NORMAL
PATH REPORT.FINAL DX SPEC: NORMAL
PATH REPORT.GROSS SPEC: NORMAL
PATH REPORT.MICROSCOPIC SPEC OTHER STN: NORMAL
PATH REPORT.RELEVANT HX SPEC: NORMAL
PHOTO IMAGE: NORMAL

## 2023-08-08 NOTE — PROGRESS NOTES
"Zelda is a 28 year old who is being evaluated via a billable video visit.      The patient has been notified of following:     \"This video visit will be conducted via a call between you and your physician/provider. We have found that certain health care needs can be provided without the need for an in-person physical exam.  This service lets us provide the care you need with a video conversation.  If a prescription is necessary we can send it directly to your pharmacy.  If lab work is needed we can place an order for that and you can then stop by our lab to have the test done at a later time.    Video visits are billed at different rates depending on your insurance coverage.  Please reach out to your insurance provider with any questions.    If during the course of the call the physician/provider feels a video visit is not appropriate, you will not be charged for this service.\"    Patient has given verbal consent for Video visit? Yes    How would you like to obtain your AVS? MyChart    If the video visit is dropped, the invitation should be resent by: Text to cell phone: [phone]    Will anyone else be joining your video visit? No    I    Video-Visit Details    Type of service:  Video Visit    Video Start Time:  1:34    Video End Time: 1:54    Originating Location (pt. Location): Work in [place]     Distant Location (provider location):  Home    Platform used for Video Visit: [facility] Medical Weight Management Note         Zelda Paulson  MRN:  [id number]  :  1995  AMANDA:  2023        Dear BRUNO KRAMER,    I had the pleasure of seeing your patient Zelda Paulson. She is a 28 year old female who I am continuing to see for treatment of obesity related to:        2021     7:23 PM   --   I have the following health issues associated with obesity Polycystic Ovarian Syndrome    Infertility    Asthma    Hypothyroidism           Assessment   Problem List Items Addressed This Visit     Class " "2 severe obesity due to excess calories with serious comorbidity and body mass index (BMI) of 35.0 to 35.9 in adult (H) - Primary     Start Wellbutrin and Naltrexone         PCOS (polycystic ovarian syndrome)          PLAN/DISCUSSION:  1.  Set up a nurse only visit at the closest [facility] to get weight, blood pressure and pulse taken. Please ask that they forward the results.   2. Start the bupropion and the naltrexone      We discussed the role of pharmacological agents in the treatment of obesity and the \"off-label\" use of medications in this practice. We discussed the risks and benefits of each. We discussed indications, contraindications, potential side effects, and estimated costs of each. Discussed that medications must always be used together with lifestyle changes such as improvements in diet choices, portion control and establishing and maintaining a regular exercise program.      Failed/contraindicated:   topiramate - upset stomach  Phentermine - worked   Bupropion - not taken - good candidate  Naltrexone - not taken - good candidate. No narcotics   Metformin - has tried.  No weight loss but GI issues   Does not think insurance covers weight loss medications    Patient will start bupropion and naltrexone. Side effects reviewed and patient information provided.        FOLLOW-UP:  3 months       SUBJECTIVE/OBJECTIVE:  Last seen 11/2022. Was on phentermine and topamax was added.  Took phentermine 15-30 mg daily. Stopped in April because she was having surgery. Stopped topiramate all together in February mainly due to side effects          8/2/2023     9:45 AM   Weight Loss Medication History Reviewed With Patient   Which weight loss medications are you currently taking on a regular basis? None   If you are not taking a weight loss medication that was prescribed to you, please indicate why: Other   Are you having any side effects from the weight loss medication that we have prescribed you? No "       Recent diet changes:  Once monthly alcohol use. Drinks 60 oz water daily     Recent exercise/activity changes: kickboxing twice weekly and goes on walks/runs with kids nightly     Recent stressors:  [place]      CURRENT WEIGHT:   192 lbs 0 oz          8/2/2023     9:45 AM   Changes and Difficulties   I have made the following changes to my diet since my last visit: No changes just trying to continue the habits i created when o started this journy   With regards to my diet, I am still struggling with: Snacks after dinner   I have made the following changes to my activity/exercise since my last visit: I stopped kick boxing but restarting in 2 weeks. I go on walks daily   With regards to my activity/exercise, I am still struggling with: Keeping up the speed         MEDICATIONS:   Current Outpatient Medications   Medication Sig Dispense Refill     albuterol (PROAIR HFA/PROVENTIL HFA/VENTOLIN HFA) 108 (90 Base) MCG/ACT inhaler Inhale 1 puff into the lungs       cyclobenzaprine (FLEXERIL) 5 MG tablet Take 1 tablet by mouth every 8 hours as needed       levonorgestrel (MIRENA) 20 MCG/DAY IUD 1 each by Intrauterine route once       levothyroxine (SYNTHROID/LEVOTHROID) 175 MCG tablet Take 1 tablet (175 mcg) by mouth daily 90 tablet 3         ROS: 8/9/2023  General  Fatigue: No  Insomnia: No  Birth Control: Mirena  HEENT  Dry Mouth: No  Hx of glaucoma: No  Vision changes:   Cardiovascular  Chest Pain with Exertion: No  Reviewed interpretive results of EKG from 2/11/2015 as sinus rhythm  Palpitations: No  Hx of heart disease: No  HTN: No  Pulmonary  Shortness of breath at rest: No  Shortness of breath with exertion: No  Gastrointestinal  Nausea: No  Diarrhea: No  Heartburn: No  Abdominal pain: No  Constipation: No  History of pancreatitis: No  History of bowel obstruction: No  Psychiatric  Moods Stable: Yes  No history of eating disorder  Neurologic:  Hx of seizures: No  Hx of paresthesias:No  Headaches: gets  migraines along    History of kidney stones: yes when she was age 18  History of kidney disease:   Endocrine:  Personal or family history of thyroid cancer: No         PHYSICAL EXAM:  Objective    Wt 192 lb (87.1 kg)   BMI 35.12 kg/m    GENERAL: Healthy, alert and no distress  EYES: Eyes grossly normal to inspection.  No discharge or erythema, or obvious scleral/conjunctival abnormalities.  RESP: No audible wheeze, cough, or visible cyanosis.  No visible retractions or increased work of breathing.    SKIN: Visible skin clear. No significant rash, abnormal pigmentation or lesions.  NEURO: Cranial nerves grossly intact.  Mentation and speech appropriate for age.  PSYCH: Mentation appears normal, affect normal/bright, judgement and insight intact, normal speech and appearance well-groomed.        Sincerely,    Maddison Frey PA-C    28 minutes spent on the date of the encounter doing chart review, review of test results, patient visit and documentation

## 2023-08-09 ENCOUNTER — VIRTUAL VISIT (OUTPATIENT)
Dept: SURGERY | Facility: CLINIC | Age: 28
End: 2023-08-09
Payer: COMMERCIAL

## 2023-08-09 VITALS — WEIGHT: 192 LBS | BODY MASS INDEX: 35.12 KG/M2

## 2023-08-09 DIAGNOSIS — E66.812 CLASS 2 SEVERE OBESITY DUE TO EXCESS CALORIES WITH SERIOUS COMORBIDITY AND BODY MASS INDEX (BMI) OF 35.0 TO 35.9 IN ADULT (H): Primary | ICD-10-CM

## 2023-08-09 DIAGNOSIS — E28.2 PCOS (POLYCYSTIC OVARIAN SYNDROME): ICD-10-CM

## 2023-08-09 DIAGNOSIS — E66.01 CLASS 2 SEVERE OBESITY DUE TO EXCESS CALORIES WITH SERIOUS COMORBIDITY AND BODY MASS INDEX (BMI) OF 35.0 TO 35.9 IN ADULT (H): Primary | ICD-10-CM

## 2023-08-09 PROCEDURE — 99213 OFFICE O/P EST LOW 20 MIN: CPT | Mod: 95 | Performed by: PHYSICIAN ASSISTANT

## 2023-08-09 RX ORDER — NALTREXONE HYDROCHLORIDE 50 MG/1
TABLET, FILM COATED ORAL
Qty: 30 TABLET | Refills: 1 | Status: SHIPPED | OUTPATIENT
Start: 2023-08-09 | End: 2023-12-08

## 2023-08-09 RX ORDER — BUPROPION HYDROCHLORIDE 100 MG/1
TABLET, EXTENDED RELEASE ORAL
Qty: 60 TABLET | Refills: 2 | Status: SHIPPED | OUTPATIENT
Start: 2023-08-09 | End: 2023-12-08

## 2023-08-09 NOTE — PATIENT INSTRUCTIONS
"Nice to talk with you today! Thank you for allowing me the privilege of caring for you.   We hope we provided you with the excellent service you deserve.     To ensure the quality of our services you may receive a patient satisfaction survey from an independent monitoring company.  The greatest compliment you can give is \"Likely to Recommend\"      Below is our plan we discussed.-  MAKSIM Washburn      1.  Set up a nurse only visit at the closest [facility] to get weight, blood pressure and pulse taken. Please ask that they forward the results.   2. Start the bupropion and the naltrexone      Please call [phone] and schedule a follow up with Maddison Frey PA-C in 3 months.  If you need to reach me sooner you can do so by calling [phone].    Have a great day!         Bupropion (By mouth)  Brand Name(s):,Wellbutrin,Wellbutrin SR,Wellbutrin XL  .  When This Medicine Should Not Be Used:  This medicine is not right for everyone. Do not use it if you had an allergic reaction to bupropion, or if you have seizures, anorexia, or bulimia.  How to Use This Medicine:  Take your medicine as directed. Your dose may need to be changed several times to find what works best for you.  Swallow the extended-release tablet whole. Do not crush, break, or chew it.    Do not take Wellbutrin  close to bedtime if you have trouble sleeping.  Take it with food if it upsets your stomach or if you have nausea.  If you take the extended-release tablet, part of the tablet may pass into your stools. This is normal and is nothing to worry about.  Missed dose: Skip the missed dose and go back to your regular dosing schedule. Never take extra medicine to make up for a missed dose.  Store the medicine in a closed container at room temperature, away from heat, moisture, and direct light.  Drugs and Foods to Avoid:  Ask your doctor or pharmacist before using any other medicine, including over-the-counter medicines, vitamins, and herbal " products.  Do not use this medicine and an MAO inhibitor (MAOI) within 14 days of each other.Tell your doctor if you take barbiturates, benzodiazepines, antiseizure medicine, or sedatives, or if you recently stopped taking them.  Some medicines can affect how bupropion works. Tell your doctor if you use any of the following:   Amantadine, carbamazepine, cimetidine, clopidogrel, cyclophosphamide, digoxin, efavirenz, levodopa, lopinavir, nelfinavir, nicotine patch, orphenadrine, phenobarbital, phenytoin, ritonavir, tamoxifen, theophylline, thiotepa, ticlopidine  Beta blocker medicine (including metoprolol)  A blood thinner (including warfarin)  Insulin or diabetes medicine  Medicine to treat depression (including desipramine, fluoxetine, imipramine, nortriptyline, paroxetine, sertraline, venlafaxine)  Medicine to treat heart rhythm problems (including flecainide, propafenone)  Medicine to treat mental illness (including haloperidol, risperidone, thioridazine)  Steroid medicine (including dexamethasone, hydrocortisone, methylprednisolone, prednisolone, prednisone)  Do not drink alcohol while you are using this medicine.  Tell your doctor if you use anything else that makes you sleepy. Some examples are allergy medicine, narcotic pain medicine, and alcohol.  Warnings While Using This Medicine:  Tell your doctor if you are pregnant or breastfeeding, or if you have kidney disease, liver disease, heart disease, diabetes, glaucoma, mental illness (including bipolar disorder), or high blood pressure. Tell your doctor if you have a history of drug addiction or if you drink alcohol.  For some children, teenagers, and young adults, this medicine may increase mental or emotional problems. This may lead to thoughts of suicide and violence. Talk with your doctor right away if you have any thoughts or behavior changes that concern you. Tell your doctor if you or anyone in your family has a history of bipolar disorder or suicide  attempts.  This medicine may cause the following problems:  Increased risk of seizures  Changes in mood or behavior  High blood pressure  Serious skin reactions  This medicine may make you dizzy or drowsy. Do not drive or do anything that could be dangerous until you know how this medicine affects you.  Do not stop using this medicine suddenly. Your doctor will need to slowly decrease your dose before you stop it completely.  Tell any doctor or dentist who treats you that you are using this medicine. This medicine may affect certain medical test results.  Your doctor will check your progress and the effects of this medicine at regular visits. Keep all appointments.  Keep all medicine out of the reach of children. Never share your medicine with anyone.  Possible Side Effects While Using This Medicine:  Call your doctor right away if you notice any of these side effects:  Allergic reaction: Itching or hives, swelling in your face or hands, swelling or tingling in your mouth or throat, chest tightness, trouble breathing  Blistering, peeling, red skin rash  Chest pain, trouble breathing, fast, slow, or uneven heartbeat  Eye pain, vision changes, seeing halos around lights  Muscle or joint pain, fever with rash  Seeing or hearing things that are not there, feeling like people are against you  Seizures  Sudden increase in energy, racing thoughts, trouble sleeping  Thoughts of hurting yourself, worsening depression, severe agitation or confusion  If you notice these less serious side effects, talk with your doctor:  Dry mouth  Headache, dizziness  Nausea, vomiting, constipation, diarrhea, gas, stomach pain  Weight gain or loss  If you notice other side effects that you think are caused by this medicine, tell your doctor.  Call your doctor for medical advice about side effects. You may report side effects to FDA at 9-454-FDA-8984    Copyright [employer] 2021 Generated on Wednesday, July 14, 2021 12:04:48 PM   "        MEDICATION STARTED AT THIS APPOINTMENT    We are starting Naltrexone. Start with 1/2 tab 1-2 hours prior to the time you have the most trouble with cravings or extra hunger. If you are doing well you may switch to a whole tablet taken at the same time period.     WARNING: This medication blocks the action of opioid type pain medications. If you routinely take any medication like Codeine, Oxycontin,Percocet,Morphine,Dilaudid or Methodone, do not take this until you have talked with weight management staff. If you are planning surgery you should stop Naltrexone 4 days prior to the surgery. If you have an injury that requires pain medication, make sure the health care staff knows you take Naltrexone.     Call the nurse at [phone] if you have any questions or concerns. (Do not stop taking it if you don't think it's working. For some people it works without them knowing it.)    Naltrexone is a medication that is used most often to help people who are troubled by dependence on prescription pain killers or alcohol. It has also been found to help with weight loss. Although it's not currently FDA approved for weight loss, it has been used safely for a number of years to help people who are carrying extra weight.     Just how Naltrexone helps with weight loss has not been exactly determined.  It seems to work by quieting down brain signals related to strong food cravings. Many of our patients use the word \"addiction\" to describe their feelings and constant thoughts about food. It makes sense then to treat the feeling of dependence on food, outside of real hunger, with a medication designed to help with other sorts of dependence.     Our patients on Naltrexone find that they:    >feel less interest in food   >think less about food and eating and have more time to think of other things   >find it easier to push the plate away   >have an easier time eating less    For some of our patients, these feelings are very " immediate. Other patients, don't feel much of a change but find they've lost weight. Like all weight loss medications, Naltrexone works best when you help it work. This means:  1. Having less tempting high calorie (fattening) food around the house or office. (For people with strong cravings this is very important.)   2. Staying away from situations or people that may trigger your cravings .   3. Eating out only one time or less each week.  4. Eating your meals at a table with the TV or computer off.    Side-effects. Naltrexone is generally well tolerated. The main side-effect we see is  nausea or a woozy feeling. A small number of people feel quite ill. Most people have a mild reaction and some people have no reaction at all.  The good news is that this feeling does go away.     In order to avoid nausea, please start the medication with half a pill for the first few days. Go on to a full pill if you are feeling well.      If you  are nauseated on 1/2 a pill it is okay to cut back to 1/4 pill ( a very small amount). Take this for a couple of days and work your way back up to a 1/2 pill and then a whole pill. Taking the medication at night or with food  to start also may help prevent the feeling of nausea.         Please refer to the pharmacy insert for more information on side-effects. Since many pharmacists are not familiar with the use of naltrexone in weight loss, calling the nurse at [phone] will get you the most accurate information.  In order to get refills of this or any medication we prescribe you must be seen in the medical weight mgmt clinic every 2-3 months.

## 2023-09-06 NOTE — ANESTHESIA CARE TRANSFER NOTE
Patient: Zelda Paulson    Procedure(s):  REPEAT  SECTION    Diagnosis: Previous  section [Z98.891]  Diagnosis Additional Information: No value filed.    Anesthesia Type:   Spinal     Note:    Oropharynx: spontaneously breathing  Level of Consciousness: awake  Oxygen Supplementation: room air    Independent Airway: airway patency satisfactory and stable  Dentition: dentition unchanged  Vital Signs Stable: post-procedure vital signs reviewed and stable  Report to RN Given: handoff report given  Patient transferred to: Labor and Delivery  Comments: Transferred to OB PACU recovery, spontaneous respirations on room air with oxygen saturations maintained greater than 98%. SpO2, NiBP, and EKG monitors and alarms on and functioning, report on patient's clinical status given to OB recovery RN, Aleyda Hunt warmer connected to patient gown Oxytocin 30 units in 500mL infusion connected to IV infusion pump in recovery bay and programmed to 100 mL/hr at handoff of care.    Handoff Report: Identifed the Patient, Identified the Reponsible Provider, Reviewed the pertinent medical history, Discussed the surgical course, Reviewed Intra-OP anesthesia mangement and issues during anesthesia, Set expectations for post-procedure period and Allowed opportunity for questions and acknowledgement of understanding      Vitals: (Last set prior to Anesthesia Care Transfer)  CRNA VITALS  2021 0956 - 2021 1026      2021             Resp Rate (set):  10        Electronically Signed By: QUINTIN Manriquez CRNA  2021  10:26 AM   No

## 2023-11-01 ENCOUNTER — TRANSFERRED RECORDS (OUTPATIENT)
Dept: HEALTH INFORMATION MANAGEMENT | Facility: CLINIC | Age: 28
End: 2023-11-01
Payer: COMMERCIAL

## 2023-11-15 ENCOUNTER — TRANSFERRED RECORDS (OUTPATIENT)
Dept: HEALTH INFORMATION MANAGEMENT | Facility: CLINIC | Age: 28
End: 2023-11-15
Payer: COMMERCIAL

## 2023-11-20 DIAGNOSIS — E03.9 HYPOTHYROIDISM, UNSPECIFIED TYPE: ICD-10-CM

## 2023-11-20 RX ORDER — LEVOTHYROXINE SODIUM 175 UG/1
175 TABLET ORAL DAILY
Qty: 90 TABLET | Refills: 0 | Status: SHIPPED | OUTPATIENT
Start: 2023-11-20 | End: 2024-06-07

## 2023-12-08 ENCOUNTER — APPOINTMENT (OUTPATIENT)
Dept: CT IMAGING | Facility: CLINIC | Age: 28
End: 2023-12-08
Attending: EMERGENCY MEDICINE
Payer: COMMERCIAL

## 2023-12-08 ENCOUNTER — MYC MEDICAL ADVICE (OUTPATIENT)
Dept: FAMILY MEDICINE | Facility: OTHER | Age: 28
End: 2023-12-08

## 2023-12-08 ENCOUNTER — HOSPITAL ENCOUNTER (EMERGENCY)
Facility: CLINIC | Age: 28
Discharge: HOME | End: 2023-12-08
Attending: EMERGENCY MEDICINE | Admitting: EMERGENCY MEDICINE
Payer: COMMERCIAL

## 2023-12-08 VITALS
TEMPERATURE: 98.1 F | DIASTOLIC BLOOD PRESSURE: 81 MMHG | SYSTOLIC BLOOD PRESSURE: 133 MMHG | RESPIRATION RATE: 16 BRPM | OXYGEN SATURATION: 98 % | HEART RATE: 94 BPM

## 2023-12-08 DIAGNOSIS — I26.93 SINGLE SUBSEGMENTAL PULMONARY EMBOLISM WITHOUT ACUTE COR PULMONALE (H): ICD-10-CM

## 2023-12-08 DIAGNOSIS — U07.1 COVID-19: ICD-10-CM

## 2023-12-08 LAB
ALBUMIN SERPL BCG-MCNC: 4.5 G/DL (ref 3.5–5.2)
ALP SERPL-CCNC: 54 U/L (ref 40–150)
ALT SERPL W P-5'-P-CCNC: 16 U/L (ref 0–50)
ANION GAP SERPL CALCULATED.3IONS-SCNC: 10 MMOL/L (ref 7–15)
AST SERPL W P-5'-P-CCNC: 16 U/L (ref 0–45)
ATRIAL RATE - MUSE: 126 BPM
BASOPHILS # BLD AUTO: 0 10E3/UL (ref 0–0.2)
BASOPHILS NFR BLD AUTO: 0 %
BILIRUB DIRECT SERPL-MCNC: <0.2 MG/DL (ref 0–0.3)
BILIRUB SERPL-MCNC: 0.3 MG/DL
BUN SERPL-MCNC: 14.5 MG/DL (ref 6–20)
CALCIUM SERPL-MCNC: 9.1 MG/DL (ref 8.6–10)
CHLORIDE SERPL-SCNC: 105 MMOL/L (ref 98–107)
CREAT SERPL-MCNC: 0.89 MG/DL (ref 0.51–0.95)
D DIMER PPP FEU-MCNC: 0.31 UG/ML FEU (ref 0–0.5)
DEPRECATED HCO3 PLAS-SCNC: 25 MMOL/L (ref 22–29)
DIASTOLIC BLOOD PRESSURE - MUSE: NORMAL MMHG
EGFRCR SERPLBLD CKD-EPI 2021: 90 ML/MIN/1.73M2
EOSINOPHIL # BLD AUTO: 0 10E3/UL (ref 0–0.7)
EOSINOPHIL NFR BLD AUTO: 0 %
ERYTHROCYTE [DISTWIDTH] IN BLOOD BY AUTOMATED COUNT: 12.2 % (ref 10–15)
FLUAV RNA SPEC QL NAA+PROBE: NEGATIVE
FLUBV RNA RESP QL NAA+PROBE: NEGATIVE
GLUCOSE SERPL-MCNC: 89 MG/DL (ref 70–99)
HCT VFR BLD AUTO: 42.1 % (ref 35–47)
HGB BLD-MCNC: 13.4 G/DL (ref 11.7–15.7)
IMM GRANULOCYTES # BLD: 0 10E3/UL
IMM GRANULOCYTES NFR BLD: 0 %
INTERPRETATION ECG - MUSE: NORMAL
LYMPHOCYTES # BLD AUTO: 0.5 10E3/UL (ref 0.8–5.3)
LYMPHOCYTES NFR BLD AUTO: 11 %
MCH RBC QN AUTO: 28.1 PG (ref 26.5–33)
MCHC RBC AUTO-ENTMCNC: 31.8 G/DL (ref 31.5–36.5)
MCV RBC AUTO: 88 FL (ref 78–100)
MONOCYTES # BLD AUTO: 0.7 10E3/UL (ref 0–1.3)
MONOCYTES NFR BLD AUTO: 14 %
NEUTROPHILS # BLD AUTO: 3.6 10E3/UL (ref 1.6–8.3)
NEUTROPHILS NFR BLD AUTO: 75 %
NRBC # BLD AUTO: 0 10E3/UL
NRBC BLD AUTO-RTO: 0 /100
P AXIS - MUSE: 46 DEGREES
PLATELET # BLD AUTO: 191 10E3/UL (ref 150–450)
POTASSIUM SERPL-SCNC: 4.1 MMOL/L (ref 3.4–5.3)
PR INTERVAL - MUSE: 142 MS
PROT SERPL-MCNC: 7.8 G/DL (ref 6.4–8.3)
QRS DURATION - MUSE: 66 MS
QT - MUSE: 306 MS
QTC - MUSE: 443 MS
R AXIS - MUSE: 7 DEGREES
RADIOLOGIST FLAGS: ABNORMAL
RBC # BLD AUTO: 4.77 10E6/UL (ref 3.8–5.2)
RSV RNA SPEC NAA+PROBE: NEGATIVE
SARS-COV-2 RNA RESP QL NAA+PROBE: POSITIVE
SODIUM SERPL-SCNC: 140 MMOL/L (ref 135–145)
SYSTOLIC BLOOD PRESSURE - MUSE: NORMAL MMHG
T AXIS - MUSE: 42 DEGREES
TROPONIN T SERPL HS-MCNC: <6 NG/L
VENTRICULAR RATE- MUSE: 126 BPM
WBC # BLD AUTO: 4.9 10E3/UL (ref 4–11)

## 2023-12-08 PROCEDURE — 82310 ASSAY OF CALCIUM: CPT | Performed by: EMERGENCY MEDICINE

## 2023-12-08 PROCEDURE — 250N000011 HC RX IP 250 OP 636: Performed by: EMERGENCY MEDICINE

## 2023-12-08 PROCEDURE — 250N000013 HC RX MED GY IP 250 OP 250 PS 637: Performed by: EMERGENCY MEDICINE

## 2023-12-08 PROCEDURE — 85025 COMPLETE CBC W/AUTO DIFF WBC: CPT | Performed by: EMERGENCY MEDICINE

## 2023-12-08 PROCEDURE — 93005 ELECTROCARDIOGRAM TRACING: CPT

## 2023-12-08 PROCEDURE — 96361 HYDRATE IV INFUSION ADD-ON: CPT

## 2023-12-08 PROCEDURE — 85379 FIBRIN DEGRADATION QUANT: CPT | Performed by: EMERGENCY MEDICINE

## 2023-12-08 PROCEDURE — 87637 SARSCOV2&INF A&B&RSV AMP PRB: CPT | Performed by: EMERGENCY MEDICINE

## 2023-12-08 PROCEDURE — 71275 CT ANGIOGRAPHY CHEST: CPT

## 2023-12-08 PROCEDURE — 258N000003 HC RX IP 258 OP 636: Performed by: EMERGENCY MEDICINE

## 2023-12-08 PROCEDURE — 96374 THER/PROPH/DIAG INJ IV PUSH: CPT | Mod: 59

## 2023-12-08 PROCEDURE — 250N000009 HC RX 250: Performed by: EMERGENCY MEDICINE

## 2023-12-08 PROCEDURE — 84484 ASSAY OF TROPONIN QUANT: CPT | Performed by: EMERGENCY MEDICINE

## 2023-12-08 PROCEDURE — 36415 COLL VENOUS BLD VENIPUNCTURE: CPT | Performed by: EMERGENCY MEDICINE

## 2023-12-08 PROCEDURE — 82248 BILIRUBIN DIRECT: CPT | Performed by: EMERGENCY MEDICINE

## 2023-12-08 PROCEDURE — 250N000011 HC RX IP 250 OP 636: Mod: JZ | Performed by: EMERGENCY MEDICINE

## 2023-12-08 PROCEDURE — 99285 EMERGENCY DEPT VISIT HI MDM: CPT | Mod: 25

## 2023-12-08 RX ORDER — IOPAMIDOL 755 MG/ML
71 INJECTION, SOLUTION INTRAVASCULAR ONCE
Status: COMPLETED | OUTPATIENT
Start: 2023-12-08 | End: 2023-12-08

## 2023-12-08 RX ORDER — KETOROLAC TROMETHAMINE 15 MG/ML
15 INJECTION, SOLUTION INTRAMUSCULAR; INTRAVENOUS ONCE
Status: COMPLETED | OUTPATIENT
Start: 2023-12-08 | End: 2023-12-08

## 2023-12-08 RX ORDER — APIXABAN 5 MG (74)
KIT ORAL
Qty: 74 EACH | Refills: 0 | Status: SHIPPED | OUTPATIENT
Start: 2023-12-08 | End: 2023-12-13

## 2023-12-08 RX ADMIN — KETOROLAC TROMETHAMINE 15 MG: 15 INJECTION, SOLUTION INTRAMUSCULAR; INTRAVENOUS at 08:13

## 2023-12-08 RX ADMIN — APIXABAN 10 MG: 5 TABLET, FILM COATED ORAL at 12:53

## 2023-12-08 RX ADMIN — IOPAMIDOL 71 ML: 755 INJECTION, SOLUTION INTRAVENOUS at 11:02

## 2023-12-08 RX ADMIN — SODIUM CHLORIDE 91 ML: 9 INJECTION, SOLUTION INTRAVENOUS at 11:02

## 2023-12-08 RX ADMIN — SODIUM CHLORIDE 1000 ML: 9 INJECTION, SOLUTION INTRAVENOUS at 08:14

## 2023-12-08 ASSESSMENT — ACTIVITIES OF DAILY LIVING (ADL)
ADLS_ACUITY_SCORE: 35

## 2023-12-08 NOTE — ED PROVIDER NOTES
History     Chief Complaint:  Palpitations       The history is provided by the patient.      Zelda Paulson is a 28 year old female with a history of hypertension, hypothyroidism, chronic kidney disease, and asthma who presents with palpitations. Over the past 2 days, she has been experiencing palpitations and a burning sensation in her chest. She notes the burning sensation worsens with deep breaths, but is not positional. She has been taking TUMS and Peptobismol with no relief. She developed a raging headache last night, and it was a worse when she woke up this morning. Later while she was at work, coworker checked her heart rate and it was 150 bpm. She took a dose of Tylenol for pain management prior to arrival. She does not have a history of DVT/PE, anxiety, hyperlipidemia, or  use of oral birth control. Also, there is no family history of blood clots, but notes her maternal grandfather might have had heart disease. She does not smoke tobacco or [employer] drugs. She denies regular use of ibuprofen or Advil. She denies fevers, abdominal pain, leg pain/swelling, cough, shortness of breath, or any other cold symptom.     Independent Historian:   None - Patient Only    Review of External Notes:   None      Medications:    Albuterol   Mirena   Synthroid   Ozempic     Past Medical History:    CKD   HTN   Hypothyroidism   GLYNN   Obesity   PCOS   Asthma   TTH   DM affecting pregnancy     Past Surgical History:    Abdominoplasty   Appendectomy   Breast lumpectomy      Tonsillectomy   Cholecystectomy   B/L mammoplasty reduction   Myringotomy     Physical Exam   Patient Vitals for the past 24 hrs:   BP Temp Temp src Pulse Resp SpO2   23 0739 133/81 98.1  F (36.7  C) Temporal (!) 128 14 100 %        Physical Exam  GENERAL: well developed, pleasant  HEAD: atraumatic  EYES: pupils reactive, extraocular muscles intact, conjunctivae normal  ENT:  mucus membranes moist  NECK:  trachea midline, normal range of  motion  RESPIRATORY: no tachypnea, breath sounds clear to auscultation   CVS: normal S1/S2, no murmurs, intact distal pulses  ABDOMEN: soft, nontender, nondistention  MUSCULOSKELETAL: no deformities  SKIN: warm and dry, no acute rashes or ulceration  NEURO: GCS 15, cranial nerves intact, alert and oriented x3  PSYCH:  Mood/affect normal    Emergency Department Course   ECG  ECG taken at 0738, ECG read at 0745  Sinus tachycardia   Possible Inferior infarct, age undetermined   Possible Anterolateral infract, age undetermined   Abnormal ECG   Rate 126 bpm. IN interval 142 ms. QRS duration 66 ms. QT/QTc 306/443 ms. P-R-T axes 46 7 42.     Imaging:  CT Chest Pulmonary Embolism w Contrast  Preliminary Result  Abnormal  IMPRESSION:  Positive study for pulmonary emboli to the right lower lobe.  [Critical Result: Pulmonary embolism]  Finding was identified on 12/8/2023 11:42 AM.   Dr. PAGE CALVERT was contacted by me at 12/8/2023 11:50 AM and  verbalized understanding of the critical finding.      Results per radiology     Laboratory:  Labs Ordered and Resulted from Time of ED Arrival to Time of ED Departure   CBC WITH PLATELETS AND DIFFERENTIAL - Abnormal       Result Value    WBC Count 4.9      RBC Count 4.77      Hemoglobin 13.4      Hematocrit 42.1      MCV 88      MCH 28.1      MCHC 31.8      RDW 12.2      Platelet Count 191      % Neutrophils 75      % Lymphocytes 11      % Monocytes 14      % Eosinophils 0      % Basophils 0      % Immature Granulocytes 0      NRBCs per 100 WBC 0      Absolute Neutrophils 3.6      Absolute Lymphocytes 0.5 (*)     Absolute Monocytes 0.7      Absolute Eosinophils 0.0      Absolute Basophils 0.0      Absolute Immature Granulocytes 0.0      Absolute NRBCs 0.0     INFLUENZA A/B, RSV, & SARS-COV2 PCR - Abnormal    Influenza A PCR Negative      Influenza B PCR Negative      RSV PCR Negative      SARS CoV2 PCR Positive (*)    D DIMER QUANTITATIVE - Normal    D-Dimer Quantitative 0.31      BASIC METABOLIC PANEL - Normal    Sodium 140      Potassium 4.1      Chloride 105      Carbon Dioxide (CO2) 25      Anion Gap 10      Urea Nitrogen 14.5      Creatinine 0.89      GFR Estimate 90      Calcium 9.1      Glucose 89     TROPONIN T, HIGH SENSITIVITY - Normal    Troponin T, High Sensitivity <6     HEPATIC FUNCTION PANEL      Emergency Department Course & Assessments:    Interventions:  0813 Toradol 15 mg IV   0814 NS 1 L IV      Assessments:  0755 I obtained history and examined the patient as noted above.  1205 I rechecked the patient and explained findings. I discussed plan for discharge home.    Independent Interpretation (X-rays, CTs, rhythm strip):  N/A    Consultations/Discussion of Management or Tests:  1150 I spoke with Radiology regarding findings.     Social Determinants of Health affecting care:   None    Disposition:  The patient was discharged to home.     Impression & Plan    Medical Decision Making:    Patient presents with chest pain and pleuritic shortness of breath for the last couple of days.  She denies feeling ill or sick.  She notes that her pulse rate is high and has this pain.  She has no PE risk factors.  D-dimer is negative.  Given her ongoing tachycardia CT was performed showing single PE.  Pasi score is low and scores tachycardia otherwise normal pulse ox systolic blood pressure and no other concerns other than asthma history.  Work of breathing is normal.  PE is likely secondary to COVID.  Her symptoms of COVID seems quite minimal.  Will treat with Eliquis.  Paxlovid would be contraindicated with Eliquis.  She would require Lovenox and paxlovid although feel that her paxlovid symptoms/COVID symptoms are quite minimal and her main symptoms are the PE.  She notes she does have a primary care doctor at home.  She has no leg pain or swelling no trips or travels have held off on ultrasounding her legs.      Diagnosis:    ICD-10-CM    1. COVID-19  U07.1       2. Single subsegmental  pulmonary embolism without acute cor pulmonale (H)  I26.93            Discharge Medications:  New Prescriptions    APIXABAN STARTER PACK (ELIQUIS DVT/PE STARTER PACK) 5 MG TBPK    Take 10 mg by mouth 2 times daily for 7 days, THEN 5 mg 2 times daily for 23 days.      Scribe Disclosure:  I, Luisolvin Lynch, am serving as a scribe at 8:43 AM on 12/8/2023 to document services personally performed by Dusty Early MD based on my observations and the provider's statements to me.   12/8/2023   Dusty Early MD Adams, Shaun L, MD  12/08/23 1300

## 2023-12-08 NOTE — ED TRIAGE NOTES
Patient reports palpitations and burning sensation in chest since Wednesday. Recently started Ozempic for weight loss, otherwise no med changes.      Triage Assessment (Adult)       Row Name 12/08/23 0737          Triage Assessment    Airway WDL WDL        Respiratory WDL    Respiratory WDL WDL        Cardiac WDL    Cardiac WDL X        Cognitive/Neuro/Behavioral WDL    Cognitive/Neuro/Behavioral WDL WDL

## 2023-12-11 NOTE — TELEPHONE ENCOUNTER
Please put her on my schedule for ED follow up ok to use any slot. She should follow up within 1 week. I should have slots for Wednesday.     .  .  QUINTIN Fisher CNP  Questions or concerns please feel free to send me a [employer] message or call me  Phone : [phone]

## 2023-12-13 ENCOUNTER — OFFICE VISIT (OUTPATIENT)
Dept: FAMILY MEDICINE | Facility: OTHER | Age: 28
End: 2023-12-13
Payer: COMMERCIAL

## 2023-12-13 ENCOUNTER — TELEPHONE (OUTPATIENT)
Dept: FAMILY MEDICINE | Facility: OTHER | Age: 28
End: 2023-12-13

## 2023-12-13 VITALS
RESPIRATION RATE: 16 BRPM | HEART RATE: 108 BPM | OXYGEN SATURATION: 99 % | WEIGHT: 186.5 LBS | BODY MASS INDEX: 34.11 KG/M2 | SYSTOLIC BLOOD PRESSURE: 120 MMHG | DIASTOLIC BLOOD PRESSURE: 78 MMHG | TEMPERATURE: 98 F

## 2023-12-13 DIAGNOSIS — I26.99 OTHER ACUTE PULMONARY EMBOLISM, UNSPECIFIED WHETHER ACUTE COR PULMONALE PRESENT (H): Primary | ICD-10-CM

## 2023-12-13 DIAGNOSIS — J45.21 MILD INTERMITTENT ASTHMA WITH ACUTE EXACERBATION: ICD-10-CM

## 2023-12-13 DIAGNOSIS — U07.1 COVID-19: ICD-10-CM

## 2023-12-13 PROCEDURE — 99214 OFFICE O/P EST MOD 30 MIN: CPT | Performed by: NURSE PRACTITIONER

## 2023-12-13 RX ORDER — APIXABAN 5 MG (74)
5 KIT ORAL 2 TIMES DAILY
Qty: 90 EACH | Refills: 0 | Status: SHIPPED | OUTPATIENT
Start: 2023-12-13 | End: 2023-12-13

## 2023-12-13 RX ORDER — ALBUTEROL SULFATE 0.83 MG/ML
2.5 SOLUTION RESPIRATORY (INHALATION) EVERY 6 HOURS PRN
Qty: 90 ML | Refills: 1 | Status: SHIPPED | OUTPATIENT
Start: 2023-12-13

## 2023-12-13 RX ORDER — ALBUTEROL SULFATE 90 UG/1
1 AEROSOL, METERED RESPIRATORY (INHALATION) EVERY 6 HOURS PRN
Qty: 18 G | Refills: 4 | Status: SHIPPED | OUTPATIENT
Start: 2023-12-13 | End: 2024-05-10

## 2023-12-13 RX ORDER — METHYLPREDNISOLONE 4 MG
TABLET, DOSE PACK ORAL
Qty: 21 TABLET | Refills: 0 | Status: SHIPPED | OUTPATIENT
Start: 2023-12-13 | End: 2024-05-10

## 2023-12-13 ASSESSMENT — ASTHMA QUESTIONNAIRES: ACT_TOTALSCORE: 22

## 2023-12-13 ASSESSMENT — PAIN SCALES - GENERAL: PAINLEVEL: NO PAIN (0)

## 2023-12-13 NOTE — TELEPHONE ENCOUNTER
Pharmacy calling.  Provider sent prescription for eliquis starter pack 5mg with BID dosing #90.  Needing new prescription sent that is NOT the starter pack.  Chart reflects initial prescription sent to [facility] 12/8/23 for starter pack.  Now needing the continuation of the Eliquis 5mg BID #180.  RN has pended prescription for provider signature.  Thank you.   Chandrika MOREL RN

## 2023-12-13 NOTE — LETTER
December 13, 2023      Zelda Paulson  [id number] [facility] [id number]        To Whom It May Concern:    Zedla Paulson was seen in our clinic. Please excuse patient from work until 12/11/23 and return on 12/18/2023.       Sincerely,        QUINTIN Fisher CNP

## 2023-12-13 NOTE — TELEPHONE ENCOUNTER
Please call patient on Friday to check on how she is doing. See office visit note from Wednesday.       QUINTIN Fisher CNP  Questions or concerns please feel free to send me a [employer] message or call me  Phone : [phone]

## 2023-12-13 NOTE — PROGRESS NOTES
Assessment & Plan     Other acute pulmonary embolism, unspecified whether acute cor pulmonale present (H)  Mild intermittent asthma with acute exacerbation   Doing well, continues on the eliquis  Given her family history of clotting disorders I will place a referral for hematology as she may need Eliquis longer than the recommended 3 months.   She has asthma and covid-19 in addition to above so I did start her on nebs today and a steroid. If she has any worsening breathing, chest pain or signs or concerns she is to return to the ER.   - Adult Oncology/Hematology  Referral; Future    COVID-19  As noted above   - albuterol (PROAIR HFA/PROVENTIL HFA/VENTOLIN HFA) 108 (90 Base) MCG/ACT inhaler; Inhale 1 puff into the lungs every 6 hours as needed for shortness of breath, wheezing or cough  - albuterol (PROVENTIL) (2.5 MG/3ML) 0.083% neb solution; Take 1 vial (2.5 mg) by nebulization every 6 hours as needed for shortness of breath, wheezing or cough  - methylPREDNISolone (MEDROL DOSEPAK) 4 MG tablet therapy pack; Follow Package Directions     MED REC REQUIRED{ discharge medications reconciled and changed, per note/orders      See Patient Instructions    QUINTIN Fisher CNP  [facility] STAR Ndiaye is a 28 year old, presenting for the following health issues:  No chief complaint on file.      HPI     ED/UC Followup:    Facility:  [facility]   Date of visit: 12/8/23  Reason for visit: Covid/pulmonary embolism   Current Status: SOB ,chest pain, cough, but feeling better     Feeling much better today         Review of Systems         Objective    /78   Pulse 108   Temp 98  F (36.7  C) (Temporal)   Resp 16   Wt 84.6 kg (186 lb 8 oz)   SpO2 99%   BMI 34.11 kg/m    Body mass index is 34.11 kg/m .  Physical Exam   GENERAL: healthy, alert and no distress  RESP: lungs clear to auscultation - no rales, rhonchi or wheezes  CV: tachycardia, normal S1 S2, no S3 or  S4, no murmur, click or rub, peripheral pulses strong, and no peripheral edema  SKIN: no suspicious lesions or rashes  NEURO: Normal strength and tone, mentation intact and speech normal  PSYCH: mentation appears normal, affect normal/bright    No results found for any visits on 12/13/23.

## 2023-12-15 ENCOUNTER — HOSPITAL ENCOUNTER (EMERGENCY)
Facility: CLINIC | Age: 28
Discharge: HOME | End: 2023-12-15
Attending: EMERGENCY MEDICINE | Admitting: EMERGENCY MEDICINE
Payer: COMMERCIAL

## 2023-12-15 ENCOUNTER — NURSE TRIAGE (OUTPATIENT)
Dept: FAMILY MEDICINE | Facility: OTHER | Age: 28
End: 2023-12-15
Payer: COMMERCIAL

## 2023-12-15 ENCOUNTER — TELEPHONE (OUTPATIENT)
Dept: FAMILY MEDICINE | Facility: OTHER | Age: 28
End: 2023-12-15

## 2023-12-15 ENCOUNTER — APPOINTMENT (OUTPATIENT)
Dept: GENERAL RADIOLOGY | Facility: CLINIC | Age: 28
End: 2023-12-15
Attending: EMERGENCY MEDICINE
Payer: COMMERCIAL

## 2023-12-15 ENCOUNTER — APPOINTMENT (OUTPATIENT)
Dept: ULTRASOUND IMAGING | Facility: CLINIC | Age: 28
End: 2023-12-15
Payer: COMMERCIAL

## 2023-12-15 VITALS
DIASTOLIC BLOOD PRESSURE: 76 MMHG | WEIGHT: 185 LBS | SYSTOLIC BLOOD PRESSURE: 124 MMHG | RESPIRATION RATE: 20 BRPM | OXYGEN SATURATION: 94 % | HEART RATE: 81 BPM | BODY MASS INDEX: 34.93 KG/M2 | TEMPERATURE: 98.5 F | HEIGHT: 61 IN

## 2023-12-15 DIAGNOSIS — R07.9 CHEST PAIN: Primary | ICD-10-CM

## 2023-12-15 DIAGNOSIS — R06.02 SHORTNESS OF BREATH: ICD-10-CM

## 2023-12-15 LAB
ANION GAP SERPL CALCULATED.3IONS-SCNC: 12 MMOL/L (ref 7–15)
ATRIAL RATE - MUSE: 95 BPM
BASOPHILS # BLD AUTO: 0 10E3/UL (ref 0–0.2)
BASOPHILS NFR BLD AUTO: 0 %
BUN SERPL-MCNC: 10.6 MG/DL (ref 6–20)
CALCIUM SERPL-MCNC: 9.1 MG/DL (ref 8.6–10)
CHLORIDE SERPL-SCNC: 104 MMOL/L (ref 98–107)
CREAT SERPL-MCNC: 0.72 MG/DL (ref 0.51–0.95)
D DIMER PPP FEU-MCNC: 0.28 UG/ML FEU (ref 0–0.5)
DEPRECATED HCO3 PLAS-SCNC: 23 MMOL/L (ref 22–29)
DIASTOLIC BLOOD PRESSURE - MUSE: NORMAL MMHG
EGFRCR SERPLBLD CKD-EPI 2021: >90 ML/MIN/1.73M2
EOSINOPHIL # BLD AUTO: 0 10E3/UL (ref 0–0.7)
EOSINOPHIL NFR BLD AUTO: 0 %
ERYTHROCYTE [DISTWIDTH] IN BLOOD BY AUTOMATED COUNT: 12 % (ref 10–15)
GLUCOSE SERPL-MCNC: 107 MG/DL (ref 70–99)
HCT VFR BLD AUTO: 43.5 % (ref 35–47)
HGB BLD-MCNC: 13.9 G/DL (ref 11.7–15.7)
HOLD SPECIMEN: NORMAL
IMM GRANULOCYTES # BLD: 0 10E3/UL
IMM GRANULOCYTES NFR BLD: 0 %
INTERPRETATION ECG - MUSE: NORMAL
LYMPHOCYTES # BLD AUTO: 1.2 10E3/UL (ref 0.8–5.3)
LYMPHOCYTES NFR BLD AUTO: 14 %
MCH RBC QN AUTO: 28 PG (ref 26.5–33)
MCHC RBC AUTO-ENTMCNC: 32 G/DL (ref 31.5–36.5)
MCV RBC AUTO: 88 FL (ref 78–100)
MONOCYTES # BLD AUTO: 0.2 10E3/UL (ref 0–1.3)
MONOCYTES NFR BLD AUTO: 3 %
NEUTROPHILS # BLD AUTO: 6.7 10E3/UL (ref 1.6–8.3)
NEUTROPHILS NFR BLD AUTO: 83 %
NRBC # BLD AUTO: 0 10E3/UL
NRBC BLD AUTO-RTO: 0 /100
NT-PROBNP SERPL-MCNC: <36 PG/ML (ref 0–450)
P AXIS - MUSE: 68 DEGREES
PLATELET # BLD AUTO: 284 10E3/UL (ref 150–450)
POTASSIUM SERPL-SCNC: 4.3 MMOL/L (ref 3.4–5.3)
PR INTERVAL - MUSE: 136 MS
QRS DURATION - MUSE: 70 MS
QT - MUSE: 320 MS
QTC - MUSE: 402 MS
R AXIS - MUSE: 35 DEGREES
RBC # BLD AUTO: 4.97 10E6/UL (ref 3.8–5.2)
SODIUM SERPL-SCNC: 139 MMOL/L (ref 135–145)
SYSTOLIC BLOOD PRESSURE - MUSE: NORMAL MMHG
T AXIS - MUSE: 54 DEGREES
TROPONIN T SERPL HS-MCNC: <6 NG/L
TSH SERPL DL<=0.005 MIU/L-ACNC: 2.56 UIU/ML (ref 0.3–4.2)
VENTRICULAR RATE- MUSE: 95 BPM
WBC # BLD AUTO: 8 10E3/UL (ref 4–11)

## 2023-12-15 PROCEDURE — 85379 FIBRIN DEGRADATION QUANT: CPT

## 2023-12-15 PROCEDURE — 82310 ASSAY OF CALCIUM: CPT | Performed by: EMERGENCY MEDICINE

## 2023-12-15 PROCEDURE — 99285 EMERGENCY DEPT VISIT HI MDM: CPT | Mod: 25

## 2023-12-15 PROCEDURE — 71046 X-RAY EXAM CHEST 2 VIEWS: CPT

## 2023-12-15 PROCEDURE — 36415 COLL VENOUS BLD VENIPUNCTURE: CPT | Performed by: EMERGENCY MEDICINE

## 2023-12-15 PROCEDURE — 83880 ASSAY OF NATRIURETIC PEPTIDE: CPT | Performed by: EMERGENCY MEDICINE

## 2023-12-15 PROCEDURE — 84484 ASSAY OF TROPONIN QUANT: CPT | Performed by: EMERGENCY MEDICINE

## 2023-12-15 PROCEDURE — 93005 ELECTROCARDIOGRAM TRACING: CPT

## 2023-12-15 PROCEDURE — 93970 EXTREMITY STUDY: CPT

## 2023-12-15 PROCEDURE — 85025 COMPLETE CBC W/AUTO DIFF WBC: CPT | Performed by: EMERGENCY MEDICINE

## 2023-12-15 PROCEDURE — 84443 ASSAY THYROID STIM HORMONE: CPT | Performed by: EMERGENCY MEDICINE

## 2023-12-15 ASSESSMENT — ACTIVITIES OF DAILY LIVING (ADL): ADLS_ACUITY_SCORE: 35

## 2023-12-15 NOTE — TELEPHONE ENCOUNTER
Patient states she now has chest pain on her right side and her shoulder blade. This is a change in her chest pain.      She states it is harder to breath. She states it hurts to breath.  Her pulse is around 105.  Patient states she has a PE in her lung.    Per protocol patient advised to return to the ER. Patient would like to know Ivy Osei's thoughts.    Caroline Montes RN on 12/15/2023 at 2:05 PM      Reason for Disposition   Pain also in shoulder(s) or arm(s) or jaw    Additional Information   Negative: SEVERE difficulty breathing (e.g., struggling for each breath, speaks in single words)   Negative: Difficult to awaken or acting confused (e.g., disoriented, slurred speech)   Negative: Shock suspected (e.g., cold/pale/clammy skin, too weak to stand, low BP, rapid pulse)   Negative: Passed out (i.e., lost consciousness, collapsed and was not responding)   Negative: Chest pain lasting longer than 5 minutes and ANY of the following:         Pain is crushing, pressure-like, or heavy         Over 44 years old          Over 30 years old and one cardiac risk factor (e.g diabetes, high blood pressure, high cholesterol, smoker, or family history of heart disease)         History of heart disease (e.g. angina, heart attack, heart failure, bypass surgery, takes nitroglycerin)   Negative: Heart beating < 50 beats per minute OR > 140 beats per minute   Negative: Visible sweat on face or sweat dripping down face   Negative: Sounds like a life-threatening emergency to the triager   Negative: Followed an injury to chest   Negative: SEVERE chest pain    Protocols used: Chest Pain-A-[place]

## 2023-12-15 NOTE — ED NOTES
PIT/Triage Evaluation    Patient presented with right-sided chest pain.  Patient reports that she was diagnosed with a PE last week.  She was also diagnosed with COVID.  Patient reports last week her chest pain was in the center of her chest and she had chest pain while breathing.  She reports now her chest pain is on the right side, and is causing her shortness of breath.  She is on Eliquis and has not missed any doses.  She also has been on albuterol and prednisone for COVID.  No new fevers.    Exam is notable for:  General: No distress  Abdomen: Soft, non-tender  Respiratory: No tachypnea  Cardiovascular: Equal pulses, brisk cap refill  Extremities: Moving all extremities      Appropriate interventions for symptom management were initiated if applicable.  Approp.  Reports that her chest pain before was in the center of her chest and was burning.  She states that it wasriate diagnostic tests were initiated if indicated.    Important information for subsequent clinician:  Diagnosed with PE and COVID last week.  New right sided chest pain and shortness of breath today.  Is on Eliquis.  No missed doses.    I briefly evaluated the patient and developed an initial plan of care. I discussed this plan and explained that this brief interaction does not constitute a full evaluation. Patient/family understands that they should wait to be fully evaluated and discuss any test results with another clinician prior to leaving the hospital.       Yadira Phillips MD  12/15/23 2258

## 2023-12-15 NOTE — ED TRIAGE NOTES
Patient here  with chest pain which started which started this morning. She is currently on medication for  PE.     Triage Assessment (Adult)       Row Name 12/15/23 5655          Triage Assessment    Airway WDL WDL        Respiratory WDL    Respiratory WDL WDL        Skin Circulation/Temperature WDL    Skin Circulation/Temperature WDL WDL        Cardiac WDL    Cardiac WDL WDL        Peripheral/Neurovascular WDL    Peripheral Neurovascular WDL WDL        Cognitive/Neuro/Behavioral WDL    Cognitive/Neuro/Behavioral WDL WDL

## 2023-12-15 NOTE — ED PROVIDER NOTES
Emergency Department Attending Supervision Note  12/15/2023  5:24 PM      I evaluated this patient with ROXIE Richter.       Briefly, the patient presented with R sided chest pain in setting of recent diagnosis small PE on Eliquis 7 days ago.  Also had COVID at the time.      On my exam, sitting upright in bed, no distress    Workup is notable for:  US Lower Extremity Venous Duplex Bilateral   Final Result   IMPRESSION:   1.  No deep venous thrombosis in the bilateral lower extremities.      XR Chest 2 Views   Final Result   IMPRESSION:       Heart size is normal. Lungs are clear bilaterally. Mediastinum and visualized bony structures are unremarkable. Clips in the right upper quadrant are compatible with cholecystectomy.        Labs Ordered and Resulted from Time of ED Arrival to Time of ED Departure   BASIC METABOLIC PANEL - Abnormal       Result Value    Sodium 139      Potassium 4.3      Chloride 104      Carbon Dioxide (CO2) 23      Anion Gap 12      Urea Nitrogen 10.6      Creatinine 0.72      GFR Estimate >90      Calcium 9.1      Glucose 107 (*)    TROPONIN T, HIGH SENSITIVITY - Normal    Troponin T, High Sensitivity <6     NT PROBNP INPATIENT - Normal    N terminal Pro BNP Inpatient <36     TSH WITH FREE T4 REFLEX - Normal    TSH 2.56     D DIMER QUANTITATIVE - Normal    D-Dimer Quantitative 0.28     CBC WITH PLATELETS AND DIFFERENTIAL    WBC Count 8.0      RBC Count 4.97      Hemoglobin 13.9      Hematocrit 43.5      MCV 88      MCH 28.0      MCHC 32.0      RDW 12.0      Platelet Count 284      % Neutrophils 83      % Lymphocytes 14      % Monocytes 3      % Eosinophils 0      % Basophils 0      % Immature Granulocytes 0      NRBCs per 100 WBC 0      Absolute Neutrophils 6.7      Absolute Lymphocytes 1.2      Absolute Monocytes 0.2      Absolute Eosinophils 0.0      Absolute Basophils 0.0      Absolute Immature Granulocytes 0.0      Absolute NRBCs 0.0         In summary, my impression is likely  referred pain from her presumed small right lower lobe PE, questionable pulmonary infarct.  She is hemodynamically stable here, has persistently undetectable D-dimer and I have low suspicion for anticoagulation failure.  Her EKG and troponin are negative.  I did review with her that small pulmonary infarct can be painful, especially her location could cause diaphragmatic  irritation with referred pain up to her right shoulder.  I did review with her that repeat CT imaging would be unlikely to  and she was comfortable with this.  Will plan for continuation of her anticoagulation and PCP follow-up    (R07.9) Chest pain  (primary encounter diagnosis)  (R06.02) Shortness of breath    Disposition:  Discharge     Bogdan Madden MD  [facility]  [facility] Emergency Department    5:24 PM 12/15/23           Bogdan Madden MD  12/15/23 8975

## 2023-12-15 NOTE — TELEPHONE ENCOUNTER
Called patient and informed provider is also recommending ED evaluation. Patient agreed to this plan and will go to [facility] ED now.     Jazmin MATAN, RN  [facility]

## 2023-12-15 NOTE — TELEPHONE ENCOUNTER
INCOMING FORMS    Sender: [employer]    Type of Form, letter or note (What is requested?): SAVANNA    How was the form received?: Fax    How should forms be returned?:  [phone]    Form placed in K[facility] bin for review/signature if appropriate.

## 2023-12-16 NOTE — DISCHARGE INSTRUCTIONS
Chest pain work up did not show any signs of pneumonia, collapsed lung (pneumothorax), rib fracture, or blood clots in the legs.  Please follow up with primary care in the next 1 week for discussion of today's visit and follow up care.  I recommend the following:    Continue with Eliquis as prescribed  Continue with remainder of at home medications as prescribed  Follow up with primary care in the next 1 week    Discharge Instructions  Chest Pain    You have been seen today for chest pain or discomfort.  At this time, your provider has found no signs that your chest pain is due to a serious or life-threatening condition, (or you have declined more testing and/or admission to the hospital). However, sometimes there is a serious problem that does not show up right away. Your evaluation today may not be complete and you may need further testing and evaluation.     Generally, every Emergency Department visit should have a follow-up clinic visit with either a primary or a specialty clinic/provider. Please follow-up as instructed by your emergency provider today.  Return to the Emergency Department if:  Your chest pain changes, gets worse, starts to happen more often, or comes with less activity.  You are newly short of breath.  You get very weak or tired.  You pass out or faint.  You have any new symptoms, like fever, cough, numb legs, or you cough up blood.  You have anything else that worries you.    Until you follow-up with your regular provider, please do the following:  Take one aspirin daily unless you have an allergy or are told not to by your provider.  If a stress test appointment has been made, go to the appointment.  If you have questions, contact your regular provider.  Follow-up with your regular provider/clinic as directed; this is very important.    If you were given a prescription for medicine here today, be sure to read all of the information (including the package insert) that comes with your  prescription.  This will include important information about the medicine, its side effects, and any warnings that you need to know about.  The pharmacist who fills the prescription can provide more information and answer questions you may have about the medicine.  If you have questions or concerns that the pharmacist cannot address, please call or return to the Emergency Department.       Remember that you can always come back to the Emergency Department if you are not able to see your regular provider in the amount of time listed above, if you get any new symptoms, or if there is anything that worries you.

## 2023-12-16 NOTE — ED PROVIDER NOTES
History     Chief Complaint:  Chest Pain       HPI   Zelda Paulson is a 28 year old female today for evaluation of chest pain and shortness of breath.  On 12/8, patient presented with chest pain and cough, diagnosed with COVID and a small right lower lobe PE.  She started on Eliquis, discharged home, and her close follow-up with her primary care provider.  Today, she woke up with pain in the right upper chest that radiates through to her shoulder blade.  This worsens with deep breath, moving, touching.  She is also felt more short of breath, reporting that she cannot even speak in full sentences without needing to stop to catch her breath.  Her cough has persisted.  She has not noticed any calf pain or swelling.  She has not missed any of her doses of Eliquis.    The patient was also started on a Medrol Dosepak and albuterol inhaler for asthma exacerbation with COVID.    Independent Historian:   None - Patient Only    Review of External Notes:   I reviewed the imaging notes from last week.  She had a very small single pulmonary embolism in the right lower lobe.  I also reviewed the ER visit from 12/8.  I reviewed the progress notes with her primary care provider on 12/13/2023.  She has a family history of clotting disorders.    Medications:    albuterol (PROAIR HFA/PROVENTIL HFA/VENTOLIN HFA) 108 (90 Base) MCG/ACT inhaler  albuterol (PROVENTIL) (2.5 MG/3ML) 0.083% neb solution  apixaban ANTICOAGULANT (ELIQUIS) 5 MG tablet  levonorgestrel (MIRENA) 20 MCG/DAY IUD  levothyroxine (SYNTHROID/LEVOTHROID) 175 MCG tablet  methylPREDNISolone (MEDROL DOSEPAK) 4 MG tablet therapy pack  Semaglutide (OZEMPIC, 0.25 OR 0.5 MG/DOSE, SC)      Past Medical History:    Past Medical History:   Diagnosis Date    Chronic kidney disease     Hypertension, unspecified type 09/28/2021    Hypothyroidism     Iron deficiency     Obesity     PCOS (polycystic ovarian syndrome)     Thyroid disease     Uncomplicated asthma      Past Surgical  "History:    Past Surgical History:   Procedure Laterality Date    ABDOMINOPLASTY N/A 2023    Procedure: ABDOMINOPLASTY;  Surgeon: Aline Albarado MD;  Location:  OR    APPENDECTOMY      BREAST SURGERY      breast lumpectomy     SECTION N/A 2018    Procedure:  SECTION;;  Surgeon: Lela Looney MD;  Location:  L+D     SECTION  2018     SECTION N/A 2021    Procedure: REPEAT  SECTION;  Surgeon: Angie Rankin MD;  Location:  L+D    ENT SURGERY      tonsillectomy    LAPAROSCOPIC CHOLECYSTECTOMY WITH CHOLANGIOGRAMS N/A 2019    Procedure: LAPAROSCOPIC CHOLECYSTECTOMY WITH POSSIBLE INTRAOPERATIVE CHOLANGIOGRAMS;  Surgeon: Valentín Butler MD;  Location:  OR    MAMMOPLASTY REDUCTION BILATERAL Bilateral 2023    Procedure: BILATERAL REDUCTION MAMMOPLASTY;  Surgeon: Aline Albarado MD;  Location:  OR      Physical Exam   Patient Vitals for the past 24 hrs:   BP Temp Temp src Pulse Resp SpO2 Height Weight   12/15/23 1941 124/76 -- -- 81 -- -- -- --   12/15/23 1800 105/57 -- -- 84 -- 94 % -- --   12/15/23 1746 (!) 126/91 -- -- 105 -- 99 % -- --   12/15/23 1719 -- -- -- -- -- -- 1.549 m (5' 1\") 83.9 kg (185 lb)   12/15/23 1718 138/88 98.5  F (36.9  C) Oral (!) 124 20 99 % -- --      Physical Exam  /76   Pulse 81   Temp 98.5  F (36.9  C) (Oral)   Resp 20   Ht 1.549 m (5' 1\")   Wt 83.9 kg (185 lb)   SpO2 94%   BMI 34.96 kg/m     General: Present in female.  Accompanied by her dad.  Examined in ED 02.  Head: Atraumatic. Normocephalic.  EENT: PERRL. EOMI. Moist mucus membranes.   CV: Tachycardic and regular rhythm. No appreciable murmurs, rubs, or gallops.  Mild amount of reproducible chest wall tenderness in the right upper chest.  Respiratory: Appropriate oxygen saturations on room air.  Speaking in short sentences.  Lungs clear to auscultation bilaterally without wheezes, rhonchi, or " rales.  GI: Soft, non-distended. Non-tender abdomen. No rebound, rigidity, or guarding.   Msk: Extremities without tenderness to palpation or deformity.  No calf swelling or tenderness to palpation.  Skin: Warm and dry. No rashes.  Neuro: Awake, alert, and conversant. No focal neurologic deficits.   Psych: Appropriate mood and affect.    Emergency Department Course   ECG  ECG results from 12/15/23   EKG 12-lead, tracing only     Value    Systolic Blood Pressure     Diastolic Blood Pressure     Ventricular Rate 95    Atrial Rate 95    WV Interval 136    QRS Duration 70        QTc 402    P Axis 68    R AXIS 35    T Axis 54    Interpretation ECG      Sinus rhythm  Cannot rule out Anterior infarct (cited on or before 08-DEC-2023)  Abnormal ECG  When compared with ECG of 08-DEC-2023 07:38,  Borderline criteria for Inferior infarct are no longer Present  Confirmed by GENERATED REPORT, COMPUTER ([id number]),  Aasen, Bradley ([id number]) on 12/15/2023 7:24:21 PM       Imaging:  US Lower Extremity Venous Duplex Bilateral   Final Result   IMPRESSION:   1.  No deep venous thrombosis in the bilateral lower extremities.      XR Chest 2 Views   Final Result   IMPRESSION:       Heart size is normal. Lungs are clear bilaterally. Mediastinum and visualized bony structures are unremarkable. Clips in the right upper quadrant are compatible with cholecystectomy.         Report per radiology    Laboratory:  Labs Ordered and Resulted from Time of ED Arrival to Time of ED Departure   BASIC METABOLIC PANEL - Abnormal       Result Value    Sodium 139      Potassium 4.3      Chloride 104      Carbon Dioxide (CO2) 23      Anion Gap 12      Urea Nitrogen 10.6      Creatinine 0.72      GFR Estimate >90      Calcium 9.1      Glucose 107 (*)    TROPONIN T, HIGH SENSITIVITY - Normal    Troponin T, High Sensitivity <6     NT PROBNP INPATIENT - Normal    N terminal Pro BNP Inpatient <36     TSH WITH FREE T4 REFLEX - Normal    TSH 2.56     D DIMER  QUANTITATIVE - Normal    D-Dimer Quantitative 0.28     CBC WITH PLATELETS AND DIFFERENTIAL    WBC Count 8.0      RBC Count 4.97      Hemoglobin 13.9      Hematocrit 43.5      MCV 88      MCH 28.0      MCHC 32.0      RDW 12.0      Platelet Count 284      % Neutrophils 83      % Lymphocytes 14      % Monocytes 3      % Eosinophils 0      % Basophils 0      % Immature Granulocytes 0      NRBCs per 100 WBC 0      Absolute Neutrophils 6.7      Absolute Lymphocytes 1.2      Absolute Monocytes 0.2      Absolute Eosinophils 0.0      Absolute Basophils 0.0      Absolute Immature Granulocytes 0.0      Absolute NRBCs 0.0        Emergency Department Course & Assessments:  Interventions:  Medications - No data to display     Assessments and Consultations:  Patient was seen in conjunction with attending physician Dr. Madden.    [id number]   I performed my initial evaluation of the patient       Independent Interpretation (X-rays, CTs, rhythm strip):  I reviewed the patient's chest x-ray.  Lung fields are clear without any focal consolidation, pleural effusion, or pneumothorax.    Social Determinants of Health affecting care:   None    Disposition:  The patient was discharged to home.     Impression & Plan    Medical Decision Making:  This is a 20-year-old female presenting as above.  On arrival, she was noted to be mildly tachycardic.  Exam normal.  Differential included ACS, PE, pneumothorax, pneumonia, asthma exacerbation, costochondritis, pleurisy.  Lab work was done as above which was normal, negative D-dimer, TSH, BNP, troponin.  Hemoglobin within normal limits.    She has not missed any doses of her Eliquis, I doubt there is a new PE.  EKG negative troponin not consistent with ACS.  No wheezing to suggest an asthma exacerbation.  No pneumothorax or pneumonia visualized on x-ray.  She may be experiencing costochondritis or pleurisy from her recent viral illness.  We did ultrasound her legs to look for any DVT that could be  throwing new clots into her lungs and there is not any seen.  Chest x-ray clear.  Discussed option for CT PE study with the patient, though stressed that the results would not change the plan.  Patient feels comfortable with discharge to home with close outpatient follow-up.  She will continue taking the Eliquis certainly return here with new or worsening symptoms.  All questions were answered and she was discharged home in stable condition.    Diagnosis:    ICD-10-CM    1. Chest pain  R07.9       2. Shortness of breath  R06.02          Silva Gutierrez PA-C  December 15, 2023   [facility]           Silva Gutierrez PA-C  12/15/23 2144

## 2023-12-18 NOTE — TELEPHONE ENCOUNTER
Attempted to call patient had questions on forms.       QUINTIN Fisher CNP  Questions or concerns please feel free to send me a [email or web address] message or call me  Phone : [phone]

## 2023-12-18 NOTE — TELEPHONE ENCOUNTER
Please call patient I am filling out her forms and I have a couple questions for her, let me know a good time to call her back      QUINTIN Fisher CNP  Questions or concerns please feel free to send me a [employer] message or call me  Phone : [phone]

## 2023-12-18 NOTE — TELEPHONE ENCOUNTER
Pt calling back stating she is available this evening as well as all day tomorrow- she does not work tomorrow.     Omayra Avelar RN

## 2023-12-19 ENCOUNTER — MYC MEDICAL ADVICE (OUTPATIENT)
Dept: FAMILY MEDICINE | Facility: OTHER | Age: 28
End: 2023-12-19
Payer: COMMERCIAL

## 2023-12-20 NOTE — TELEPHONE ENCOUNTER
Done please fax.       QUINTIN Fisher CNP  Questions or concerns please feel free to send me a [employer] message or call me  Phone : [phone]

## 2024-01-03 NOTE — PROGRESS NOTES
"    [facility]  [address] [place] [id number]  Main: [phone], Fax: [phone]    Patient seen at: [facility] at [address] [place] [address]    Outpatient Visit Note:    Patient: Zelda Paulson  MRN: [id number]  : 1995  AMANDA: 2024    Reason for visit:  Tiny peripheral pulmonary embolism in the posterior right lower lobe on 2023.    HPI:  Zelda is a 28 year old female with a history of hypertension, hypothyroidism, chronic renal disease, and asthma who was found to have \"tiny\" peripheral pulmonary embolism in the posterior right lower lobe on 2023, referred by Ivy Osei CNP of [facility] for consultation.     Zelda reports that about 6 months after giving birth to her last child in , she had a Mirena IUD placed and has that in place ever since.     2023, Zelda underwent abdominoplasty and breast reduction surgery without any complications.     Then on 2023, Zelda and her family flew from [place] to [place] (1.5 hr), then to [facility] (2.5 hrs), then a 3 hours car trip after arriving to [place] to their final destination. Then on 2023, return trip where they drove 3 hours to [place], stay overnight and took the flights on 2023 to [place] (1.5 hours), then from [place] to [place] (1.5 hours).     Dated back to 2023, she presented to the emergency department with a 2 days history of palpitations and a burning sensation in her chest that worsen with deep breaths. D-Dimer was negative at 0.31 (normal 0.0-0.5). COVID-19 test was positive at the time with negative Influenza and RSV. CT chest was done and showed: Tiny peripheral pulmonary embolism in the posterior right lower lobe. No infiltrates or effusions noted. She was started on apixaban and was discharged home.     12/15/2023, she returned to the emergency department with chest pain. CXR " was negative. Bilateral lower extremity venous ultrasound was negative for DVT. Apixaban was continued and she was discharged.     Other than the positive COVID-19 test on 12/8/2023 and the travel on 11/23/2023, 11/27/2023 and 11/28/2023 as described above, she has had no surgeries, hospitalization or any traumatic injuries prior to her diagnosis of pulmonary embolism. She recalls that she did not have any symptoms with her COVID-19 infection (noted her CXR was negative).     Currently she is on apixaban at 5 mg PO BID dosing. She denies any bleeding issues. Zelda denies any history of bleeding diathesis.     Today, she continues to complaint of some right sided chest pain.     ROS:  Denies any bleeding complications. Specifically, no frequent epistaxis. No issues with oral mucosal bleeding. Denies any hematuria or blood in stools. Denies any shortness of breath. No chest pain. No cough. No fever.    Medications:  Current Outpatient Medications   Medication    albuterol (PROAIR HFA/PROVENTIL HFA/VENTOLIN HFA) 108 (90 Base) MCG/ACT inhaler    albuterol (PROVENTIL) (2.5 MG/3ML) 0.083% neb solution    apixaban ANTICOAGULANT (ELIQUIS) 5 MG tablet    levonorgestrel (MIRENA) 20 MCG/DAY IUD    levothyroxine (SYNTHROID/LEVOTHROID) 175 MCG tablet    methylPREDNISolone (MEDROL DOSEPAK) 4 MG tablet therapy pack    Semaglutide (OZEMPIC, 0.25 OR 0.5 MG/DOSE, SC)     No current facility-administered medications for this visit.     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     Family History:  She has one older brother (her only sibling, who is currently 32 years of age) has a history of pulmonary embolism back in 2019. At the time of his pulmonary embolic event, he was also being treated for pancreatitis from a history of alcoholism in the setting of an altered congenital pancreatic duct.   Zelda's son (who is 5 years of age) was evaluated for easy bruising and was told that his Von Willebrand Disease testing were inconclusive.   Zelda has a 2, close to 3 year old daughter who has no significant past medical history.   Zelda was told in the past that her paternal aunt had some undefined \"clotting thing\" after tonsillectomy when she was younger. No further details.     Objective:  Vitals: /77 (BP Location: Right arm, Patient Position: Sitting, Cuff Size: Adult Large)   Pulse 71   Temp 96.8  F (36  C) (Tympanic)   Wt 84.1 kg (185 lb 4.8 oz)   SpO2 97%   BMI 35.01 kg/m    Exam:   Complete exam is not performed today.     Labs:  Component      Latest Ref Rng 12/8/2023  8:14 AM 12/8/2023  9:49 AM 12/15/2023  5:41 PM   WBC      4.0 - 11.0 10e3/uL   8.0    RBC Count      3.80 - 5.20 10e6/uL   4.97    Hemoglobin      11.7 - 15.7 g/dL   13.9    Hematocrit      35.0 - 47.0 %   43.5    MCV      78 - 100 fL   88    MCH      26.5 - 33.0 pg   28.0    MCHC      31.5 - 36.5 g/dL   32.0    RDW      10.0 - 15.0 %   12.0    Platelet Count      150 - 450 10e3/uL   284    % Neutrophils      %   83    % Lymphocytes      %   14    % Monocytes      %   3    % Eosinophils      %   0    % Basophils      %   0    % Immature Granulocytes      %   0    NRBCs per 100 WBC      <1 /100   0    Absolute Neutrophils      1.6 - 8.3 10e3/uL   6.7    Absolute Lymphocytes      0.8 - 5.3 10e3/uL   1.2    Absolute Monocytes      0.0 - 1.3 10e3/uL   0.2    Absolute Eosinophils      0.0 - 0.7 10e3/uL   0.0    Absolute Basophils      0.0 - 0.2 10e3/uL   0.0    Absolute " "Immature Granulocytes      <=0.4 10e3/uL   0.0    Absolute NRBCs      10e3/uL   0.0    Sodium      135 - 145 mmol/L   139    Potassium      3.4 - 5.3 mmol/L   4.3    Chloride      98 - 107 mmol/L   104    Carbon Dioxide (CO2)      22 - 29 mmol/L   23    Anion Gap      7 - 15 mmol/L   12    Urea Nitrogen      6.0 - 20.0 mg/dL   10.6    Creatinine      0.51 - 0.95 mg/dL   0.72    GFR Estimate      >60 mL/min/1.73m2   >90    Calcium      8.6 - 10.0 mg/dL   9.1    Glucose      70 - 99 mg/dL   107 (H)    Protein Total      6.4 - 8.3 g/dL 7.8      Albumin      3.5 - 5.2 g/dL 4.5      Bilirubin Total      <=1.2 mg/dL 0.3      Alkaline Phosphatase      40 - 150 U/L 54      AST      0 - 45 U/L 16      ALT      0 - 50 U/L 16      Bilirubin Direct      0.00 - 0.30 mg/dL <0.20      Influenza A      Negative   Negative     Influenza B      Negative   Negative     Resp Syncytial Virus      Negative   Negative     SARS CoV2 PCR      Negative   Positive !        Imaging:  Reviewed and are as described above.     Assessment:  In summary, Zelda is a 28 year old female with a history of hypertension, hypothyroidism, chronic renal disease (renal stones), and asthma who was found to have \"tiny\" peripheral pulmonary embolism in the posterior right lower lobe on 12/8/2023, referred by Ivy Osei CNP of [facility] for consultation.    Zelda's pulmonary embolic event was a provoked event. Her major provoking factor leading up to her pulmonary embolic event on 12/8/2023 was her travelling to [place] from [place] on 11/23/2023 returning on 11/28/2023. Although her flights were <4 hours but after the flight and before the flight on her return trip, she was in a car for 3 hours to and from her final destination in [place]. Additionally, she also was tested positive for COVID-19 infection at the time of her diagnosis of pulmonary embolism, though she had no URI symptoms.     Zelda does have a family history of DVT with her " "brother which was also a provoked event as at the time of his pulmonary embolism, he had pancreatitis.     Diagnosis:  Provoked \"tiny\" pulmonary embolism of the right lower lobe found on 12/8/2023.  Family history of provoked DVT with her brother.     Plan:  I have a long discussion with Zelda today in regard to our plan and recommendation.     I have spent quite a bit of time today to educate Zelda on DVT/PE, provoked vs unprovoked venous thromboembolic event (VTE) and general approach in regard to anticoagulation therapy and duration.     I explain to Zelda that her pulmonary embolic event was a provoked event mostly related to her long distance travelling the week prior and might or might not related to her asymptomatic COVID-19 infection. Thus in accordance to current American Society of Hematology (CHELSY) guidelines, 3-6 months of anticoagulation therapy should be suffice. Considering that her pulmonary embolism was \"tiny\", I am recommending 3 months total of anticoagulation therapy.     I will plan to see her back around March 2024 and will likely discontinue her anticoagulation therapy at that time.    In the future, she will need episodic pharmacological DVT/PE prophylaxis at times of increase venous thromboembolic risks including future long distance travelling. This can simply be achieved by taking rivaroxaban at 10 mg x 1 just prior to any long distance travelling that is >4 hours.     Since her brother and her venous thromboembolic events were provoked and that she has no other family members with history of DVT/PE, it is unlikely that she has any inherit thrombophilia. I do not feel that a complete inherit thrombophilia workup is necessary as this would not change our recommendation about her duration of anticoagulation therapy.     Plan Summary:  Continue apixaban at 5 mg PO BID dosing.   Return to clinic to see me in March 2024 and likely discontinue her anticoagulation therapy.   Future episodic " pharmacological DVT/PE prophylaxis at times of increased venous thromboembolic risk.         Chay Muñoz PA-C, MPAS  Physician Assistant  [facility].     52 minutes spent by me on the date of the encounter doing chart review, history and exam, documentation and further activities per the note.    Time IN: 09:00  Time OUT: 09:32

## 2024-01-09 ENCOUNTER — OFFICE VISIT (OUTPATIENT)
Dept: HEMATOLOGY | Facility: CLINIC | Age: 29
End: 2024-01-09
Attending: NURSE PRACTITIONER
Payer: COMMERCIAL

## 2024-01-09 VITALS
WEIGHT: 185.3 LBS | OXYGEN SATURATION: 97 % | SYSTOLIC BLOOD PRESSURE: 116 MMHG | HEART RATE: 71 BPM | BODY MASS INDEX: 35.01 KG/M2 | DIASTOLIC BLOOD PRESSURE: 77 MMHG | TEMPERATURE: 96.8 F

## 2024-01-09 DIAGNOSIS — Z86.711 HISTORY OF PULMONARY EMBOLISM: Primary | ICD-10-CM

## 2024-01-09 DIAGNOSIS — Z82.49 FAMILY HISTORY OF DVT: ICD-10-CM

## 2024-01-09 DIAGNOSIS — I26.99 OTHER ACUTE PULMONARY EMBOLISM, UNSPECIFIED WHETHER ACUTE COR PULMONALE PRESENT (H): ICD-10-CM

## 2024-01-09 PROCEDURE — 99204 OFFICE O/P NEW MOD 45 MIN: CPT | Performed by: PHYSICIAN ASSISTANT

## 2024-01-09 PROCEDURE — 99213 OFFICE O/P EST LOW 20 MIN: CPT | Performed by: PHYSICIAN ASSISTANT

## 2024-01-09 NOTE — LETTER
"          [facility]  [address] [place] [address]  Main: [phone], Fax: [phone]    Patient seen at: [facility] at [address] [place] [address]    Outpatient Visit Note:    Patient: Zelda Paulson  MRN: [id number]  : 1995  AMANDA: 2024    Reason for visit:  Tiny peripheral pulmonary embolism in the posterior right lower lobe on 2023.    HPI:  Zelda is a 28 year old female with a history of hypertension, hypothyroidism, chronic renal disease, and asthma who was found to have \"tiny\" peripheral pulmonary embolism in the posterior right lower lobe on 2023, referred by Ivy Osei CNP of [facility] for consultation.     Zelda reports that about 6 months after giving birth to her last child in , she had a Mirena IUD placed and has that in place ever since.     2023, Zelda underwent abdominoplasty and breast reduction surgery without any complications.     Then on 2023, Zelda and her family flew from [place] to [place] (1.5 hr), then to [facility] (2.5 hrs), then a 3 hours car trip after arriving to [place] to their final destination. Then on 2023, return trip where they drove 3 hours to [place], stay overnight and took the flights on 2023 to [place] (1.5 hours), then from [place] to [place] (1.5 hours).     Dated back to 2023, she presented to the emergency department with a 2 days history of palpitations and a burning sensation in her chest that worsen with deep breaths. D-Dimer was negative at 0.31 (normal 0.0-0.5). COVID-19 test was positive at the time with negative Influenza and RSV. CT chest was done and showed: Tiny peripheral pulmonary embolism in the posterior right lower lobe. No infiltrates or effusions noted. She was started on apixaban and was discharged home.     12/15/2023, she returned to the emergency department with chest pain. " CXR was negative. Bilateral lower extremity venous ultrasound was negative for DVT. Apixaban was continued and she was discharged.     Other than the positive COVID-19 test on 12/8/2023 and the travel on 11/23/2023, 11/27/2023 and 11/28/2023 as described above, she has had no surgeries, hospitalization or any traumatic injuries prior to her diagnosis of pulmonary embolism. She recalls that she did not have any symptoms with her COVID-19 infection (noted her CXR was negative).     Currently she is on apixaban at 5 mg PO BID dosing. She denies any bleeding issues. Zelda denies any history of bleeding diathesis.     Today, she continues to complaint of some right sided chest pain.     ROS:  Denies any bleeding complications. Specifically, no frequent epistaxis. No issues with oral mucosal bleeding. Denies any hematuria or blood in stools. Denies any shortness of breath. No chest pain. No cough. No fever.    Medications:  Current Outpatient Medications   Medication     albuterol (PROAIR HFA/PROVENTIL HFA/VENTOLIN HFA) 108 (90 Base) MCG/ACT inhaler     albuterol (PROVENTIL) (2.5 MG/3ML) 0.083% neb solution     apixaban ANTICOAGULANT (ELIQUIS) 5 MG tablet     levonorgestrel (MIRENA) 20 MCG/DAY IUD     levothyroxine (SYNTHROID/LEVOTHROID) 175 MCG tablet     methylPREDNISolone (MEDROL DOSEPAK) 4 MG tablet therapy pack     Semaglutide (OZEMPIC, 0.25 OR 0.5 MG/DOSE, SC)     No current facility-administered medications for this visit.     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     Family History:  She has one older brother (her only sibling, who is currently 32 years of age) has a history of pulmonary embolism back in 2019. At the time of his pulmonary embolic event, he was also being treated for pancreatitis from a history of alcoholism in the setting of an altered congenital pancreatic duct.   Zelda's son (who is 5 years of age) was evaluated for easy bruising and was told that his Von Willebrand Disease testing were inconclusive.   Zelda has a 2, close to 3 year old daughter who has no significant past medical history.   Zelda was told in the past that her paternal aunt had some undefined \"clotting thing\" after tonsillectomy when she was younger. No further details.     Objective:  Vitals: /77 (BP Location: Right arm, Patient Position: Sitting, Cuff Size: Adult Large)   Pulse 71   Temp 96.8  F (36  C) (Tympanic)   Wt 84.1 kg (185 lb 4.8 oz)   SpO2 97%   BMI 35.01 kg/m    Exam:   Complete exam is not performed today.     Labs:  Component      Latest Ref Rng 12/8/2023  8:14 AM 12/8/2023  9:49 AM 12/15/2023  5:41 PM   WBC      4.0 - 11.0 10e3/uL   8.0    RBC Count      3.80 - 5.20 10e6/uL   4.97    Hemoglobin      11.7 - 15.7 g/dL   13.9    Hematocrit      35.0 - 47.0 %   43.5    MCV      78 - 100 fL   88    MCH      26.5 - 33.0 pg   28.0    MCHC      31.5 - 36.5 g/dL   32.0    RDW      10.0 - 15.0 %   12.0    Platelet Count      150 - 450 10e3/uL   284    % Neutrophils      %   83    % Lymphocytes      %   14    % Monocytes      %   3    % Eosinophils      %   0    % Basophils      %   0    % Immature Granulocytes      %   0    NRBCs per 100 WBC      <1 /100   0    Absolute Neutrophils      1.6 - 8.3 10e3/uL   6.7    Absolute Lymphocytes      0.8 - 5.3 10e3/uL   1.2    Absolute Monocytes      0.0 - 1.3 10e3/uL   0.2    Absolute Eosinophils      0.0 - 0.7 10e3/uL   0.0    Absolute Basophils      0.0 - 0.2 " "10e3/uL   0.0    Absolute Immature Granulocytes      <=0.4 10e3/uL   0.0    Absolute NRBCs      10e3/uL   0.0    Sodium      135 - 145 mmol/L   139    Potassium      3.4 - 5.3 mmol/L   4.3    Chloride      98 - 107 mmol/L   104    Carbon Dioxide (CO2)      22 - 29 mmol/L   23    Anion Gap      7 - 15 mmol/L   12    Urea Nitrogen      6.0 - 20.0 mg/dL   10.6    Creatinine      0.51 - 0.95 mg/dL   0.72    GFR Estimate      >60 mL/min/1.73m2   >90    Calcium      8.6 - 10.0 mg/dL   9.1    Glucose      70 - 99 mg/dL   107 (H)    Protein Total      6.4 - 8.3 g/dL 7.8      Albumin      3.5 - 5.2 g/dL 4.5      Bilirubin Total      <=1.2 mg/dL 0.3      Alkaline Phosphatase      40 - 150 U/L 54      AST      0 - 45 U/L 16      ALT      0 - 50 U/L 16      Bilirubin Direct      0.00 - 0.30 mg/dL <0.20      Influenza A      Negative   Negative     Influenza B      Negative   Negative     Resp Syncytial Virus      Negative   Negative     SARS CoV2 PCR      Negative   Positive !        Imaging:  Reviewed and are as described above.     Assessment:  In summary, Zelda is a 28 year old female with a history of hypertension, hypothyroidism, chronic renal disease (renal stones), and asthma who was found to have \"tiny\" peripheral pulmonary embolism in the posterior right lower lobe on 12/8/2023, referred by Ivy Osei CNP of [facility] for consultation.    Zelda's pulmonary embolic event was a provoked event. Her major provoking factor leading up to her pulmonary embolic event on 12/8/2023 was her travelling to [place] from [place] on 11/23/2023 returning on 11/28/2023. Although her flights were <4 hours but after the flight and before the flight on her return trip, she was in a car for 3 hours to and from her final destination in [place]. Additionally, she also was tested positive for COVID-19 infection at the time of her diagnosis of pulmonary embolism, though she had no URI symptoms.     Zelda does have a family " "history of DVT with her brother which was also a provoked event as at the time of his pulmonary embolism, he had pancreatitis.     Diagnosis:  Provoked \"tiny\" pulmonary embolism of the right lower lobe found on 12/8/2023.  Family history of provoked DVT with her brother.     Plan:  I have a long discussion with Zelda today in regard to our plan and recommendation.     I have spent quite a bit of time today to educate Zelda on DVT/PE, provoked vs unprovoked venous thromboembolic event (VTE) and general approach in regard to anticoagulation therapy and duration.     I explain to Zelda that her pulmonary embolic event was a provoked event mostly related to her long distance travelling the week prior and might or might not related to her asymptomatic COVID-19 infection. Thus in accordance to current American Society of Hematology (CHELSY) guidelines, 3-6 months of anticoagulation therapy should be suffice. Considering that her pulmonary embolism was \"tiny\", I am recommending 3 months total of anticoagulation therapy.     I will plan to see her back around March 2024 and will likely discontinue her anticoagulation therapy at that time.    In the future, she will need episodic pharmacological DVT/PE prophylaxis at times of increase venous thromboembolic risks including future long distance travelling. This can simply be achieved by taking rivaroxaban at 10 mg x 1 just prior to any long distance travelling that is >4 hours.     Since her brother and her venous thromboembolic events were provoked and that she has no other family members with history of DVT/PE, it is unlikely that she has any inherit thrombophilia. I do not feel that a complete inherit thrombophilia workup is necessary as this would not change our recommendation about her duration of anticoagulation therapy.     Plan Summary:  Continue apixaban at 5 mg PO BID dosing.   Return to clinic to see me in March 2024 and likely discontinue her anticoagulation therapy. "   Future episodic pharmacological DVT/PE prophylaxis at times of increased venous thromboembolic risk.         Chay Muñoz PA-C, MPAS  Physician Assistant  [facility].     52 minutes spent by me on the date of the encounter doing chart review, history and exam, documentation and further activities per the note.    Time IN: 09:00  Time OUT: 09:32

## 2024-01-11 PROBLEM — O24.410 DIET CONTROLLED GESTATIONAL DIABETES MELLITUS (GDM), ANTEPARTUM: Status: RESOLVED | Noted: 2023-03-29 | Resolved: 2024-01-11

## 2024-01-11 PROBLEM — O24.410 DIET CONTROLLED GESTATIONAL DIABETES MELLITUS (GDM), ANTEPARTUM: Status: ACTIVE | Noted: 2023-03-29

## 2024-01-19 ENCOUNTER — MYC MEDICAL ADVICE (OUTPATIENT)
Dept: FAMILY MEDICINE | Facility: OTHER | Age: 29
End: 2024-01-19
Payer: COMMERCIAL

## 2024-01-19 NOTE — TELEPHONE ENCOUNTER
Please triage, I recommend ER, ADS or Urgent care.       QUINTIN Fisher CNP  Questions or concerns please feel free to send me a [employer] message or call me  Phone : [phone]

## 2024-02-20 NOTE — PROGRESS NOTES
"    [facility]  [facility]  [address], [address], [place] [address]  Main: [phone], Fax: [phone]    Video Virtual Visit Note:    Patient: Zelda Paulson  MRN: [id number]  : 1995  AMANDA: 2024  Location of the patient when this video visit is conducted: Patient's home  Location of this writer at the time of this video visit is conducted: [facility], [facility].     Due to the ongoing COVID-19 outbreak, this visit was conducted by video, with the patient's approval.    Reason of today's visit:  Tiny peripheral pulmonary embolism in the posterior right lower lobe on 2023.     Clinical History Summary:  Zelda is a 28 year old female with a history of hypertension, hypothyroidism, chronic renal disease, and asthma who was found to have \"tiny\" peripheral pulmonary embolism in the posterior right lower lobe on 2023, currently on apixaban at 5 mg PO BID, participates in today's video visit for her follow up. She was last seen by this writer back on 2024.     Thrombosis History Summary:  6 months after her giving birth to her last child in , she had a Mirena IUD placed.   2023, Zelda underwent abdominoplasty and breast reduction surgery without any complications.   Then on 2023, Zelda and her family flew from [place] to [place] (1.5 hr), then to [facility] (2.5 hrs), then a 3 hours car trip after arriving to [place] to their final destination.   Then on 2023, return trip where they drove 3 hours to [place], stay overnight and took the flights on 2023 to [place] (1.5 hours), then from [place] to [place] (1.5 hours).   On 2023, she presented to the emergency department with a 2 days history of palpitations and a burning sensation in her chest that worsen with deep breaths. D-Dimer was negative at 0.31 (normal 0.0-0.5). COVID-19 " "test was positive at the time with negative Influenza and RSV. CT chest was done and showed: Tiny peripheral pulmonary embolism in the posterior right lower lobe. No infiltrates or effusions noted. She was started on apixaban and was discharged home.   12/15/2023, she returned to the emergency department with chest pain. CXR was negative. Bilateral lower extremity venous ultrasound was negative for DVT. Apixaban was continued and she was discharged.   Other than the positive COVID-19 test on 12/8/2023 and the travel on 11/23/2023, 11/27/2023 and 11/28/2023 as described above, she has had no surgeries, hospitalization or any traumatic injuries prior to her diagnosis of pulmonary embolism. She recalls that she did not have any symptoms with her COVID-19 infection (noted her CXR was negative).     Interim History:  1/9/2024, she established care with this writer for which I determined that her \"tiny\" pulmonary embolic event was a provoked venous thromboembolic event. Thus it was my recommendation that she is to remain on anticoagulation therapy for a total of 3 months and then discontinue.     She is currently on apixaban at 5 mg PO BID dosing. She denies any bleeding issues while on this medication. She reports that she is planning to fly to [place] in April 2024. She denies any respiratory symptoms today.     ROS:  Denies any bleeding complications. Specifically, no frequent epistaxis. No issues with oral mucosal bleeding. Denies any hematuria or blood in stools. Denies any shortness of breath. No chest pain. No cough. No fever.    Medications:   Current Outpatient Medications   Medication    albuterol (PROAIR HFA/PROVENTIL HFA/VENTOLIN HFA) 108 (90 Base) MCG/ACT inhaler    albuterol (PROVENTIL) (2.5 MG/3ML) 0.083% neb solution    apixaban ANTICOAGULANT (ELIQUIS) 5 MG tablet    levonorgestrel (MIRENA) 20 MCG/DAY IUD    levothyroxine (SYNTHROID/LEVOTHROID) 175 MCG tablet    methylPREDNISolone (MEDROL DOSEPAK) 4 MG " "tablet therapy pack    Semaglutide (OZEMPIC, 0.25 OR 0.5 MG/DOSE, SC)     No current facility-administered medications for this visit.     Allergies:   Allergies   Allergen Reactions    Fentanyl Anaphylaxis, Rash and Shortness Of Breath     itching    Morphine Anaphylaxis, Nausea, Rash and Shortness Of Breath    Sulfa Antibiotics Rash      PMH:   Past Medical History:   Diagnosis Date    Chronic kidney disease     kidney stones    Hypertension, unspecified type 09/28/2021    Hypothyroidism     Iron deficiency     Obesity     PCOS (polycystic ovarian syndrome)     Thyroid disease     Uncomplicated asthma        Social History:   Deferred    Family History:  Deferred    Objective:  Visual Examination via Video:  Pleasant in no acute distress.  Normal work of breathing   A+O x 3    Assessment:  In summary, Zelda is a 28 year old female with a history of hypertension, hypothyroidism, chronic renal disease (renal stones), and asthma who was found to have \"tiny\" peripheral pulmonary embolism in the posterior right lower lobe on 12/8/2023, currently on apixaban at 5 mg PO BID, participates in today's video visit for her follow up.    Zelda's pulmonary embolic event was a provoked event. Her major provoking factor leading up to her pulmonary embolic event on 12/8/2023 was her travelling to [place] from [place] on 11/23/2023 returning on 11/28/2023. Although her flights were <4 hours but after the flight and before the flight on her return trip, she was in a car for 3 hours to and from her final destination in [place]. Additionally, she also was tested positive for COVID-19 infection at the time of her diagnosis of pulmonary embolism, though she had no URI symptoms.      Zelda does have a family history of DVT with her brother which was also a provoked event as at the time of his pulmonary embolism, he had pancreatitis.      Diagnosis:  Provoked \"tiny\" pulmonary embolism of the right lower lobe found on " 12/8/2023.  Family history of provoked DVT with her brother.     Plan:  As mentioned in my previous consult note, her pulmonary embolic event back on 12/8/2023 was a provoked event and thus she will not need long term anticoagulation therapy. She will complete her 3 months anticoagulation therapy as of 3/8/2024. She is instructed to discontinue all anticoagulation therapy (Apixaban) after her 3/8/2024 dose.     I will have her go on episodic pharmacological DVT/PE prophylaxis in the future at times of her being at increase risk of venous thromboembolism such as long distance travel of >4 hours. This can simply achieve by having her take a tablet of rivaroxaban at 10 mg PO Q 24 hours as needed for any flight or car travel of > 4 hours. I have provided her with a prescription of this today with 10 tablets with 1 refill.     Her primary care provider certainly can continue to prescribe her as needed rivaroxaban tablets in the future. I will only plan on seeing her back annually if she would like this writer to continue to prescribe her as needed rivaroxaban in the future.     Video-Visit Details:  Type of service:  Video Visit  Video Start Time:  10:27  Video End Time (time video stopped): 10:36  Originating Location (pt. Location): Home  Distant Location (provider location):  [facility]   Mode of Communication:  Video Conference via Destiny Muñoz PA-C, MPAS  Physician Assistant  [facility].     20 minutes spent by me on the date of the encounter doing chart review, history and exam, documentation and further activities per the note

## 2024-02-27 ENCOUNTER — VIRTUAL VISIT (OUTPATIENT)
Dept: HEMATOLOGY | Facility: CLINIC | Age: 29
End: 2024-02-27
Attending: PHYSICIAN ASSISTANT
Payer: COMMERCIAL

## 2024-02-27 DIAGNOSIS — Z82.49 FAMILY HISTORY OF DVT: ICD-10-CM

## 2024-02-27 DIAGNOSIS — Z86.711 HISTORY OF PULMONARY EMBOLISM: Primary | ICD-10-CM

## 2024-02-27 PROCEDURE — 99213 OFFICE O/P EST LOW 20 MIN: CPT | Mod: 95 | Performed by: PHYSICIAN ASSISTANT

## 2024-02-27 NOTE — PROGRESS NOTES
Patient was contacted to complete the pre-visit call prior to their telephone visit with the provider.     Allergies and medications were reviewed.     I thanked them for their time to cover this information.     Bhumi Renteria MA

## 2024-02-27 NOTE — PATIENT INSTRUCTIONS
Zelda,    It was nice to see you via video visit today.     Below is a summary of our plan:  Please continue to take your apixaban (Eliquis) at 5 mg by mouth every 12 hours through 3/8/2024. Then you can discontinue thereafter.   For future long distance flight or car travel of > 4 hours, you can take a dose of rivaroxaban (Xarelto) at 10 mg by mouth every 24 hours as needed just prior to the trip for prevention of blood clots.   At this time, I have no plans to see you back on a routine basis. You can continue to have your primary care provider prescribe you Xarelto for future travels. However, if you would like me to continue with prescribing, then I will see you back once yearly to renew your prescription. Virtual visit is fine.   If you should have any further questions, please do not hesitate to call [phone] and ask to speak to a nursing staff.    Thank you once again in choosing our clinic as part of your healthcare team.      Chay Muñoz PA-C, MPAS  Physician Assistant  [facility].

## 2024-05-04 ENCOUNTER — HEALTH MAINTENANCE LETTER (OUTPATIENT)
Age: 29
End: 2024-05-04

## 2024-05-10 ENCOUNTER — OFFICE VISIT (OUTPATIENT)
Dept: FAMILY MEDICINE | Facility: OTHER | Age: 29
End: 2024-05-10
Payer: COMMERCIAL

## 2024-05-10 VITALS
HEART RATE: 74 BPM | WEIGHT: 173 LBS | TEMPERATURE: 98 F | BODY MASS INDEX: 31.83 KG/M2 | OXYGEN SATURATION: 99 % | DIASTOLIC BLOOD PRESSURE: 66 MMHG | SYSTOLIC BLOOD PRESSURE: 110 MMHG | HEIGHT: 62 IN | RESPIRATION RATE: 16 BRPM

## 2024-05-10 DIAGNOSIS — F41.1 GAD (GENERALIZED ANXIETY DISORDER): ICD-10-CM

## 2024-05-10 DIAGNOSIS — J45.20 MILD INTERMITTENT ASTHMA WITHOUT COMPLICATION: ICD-10-CM

## 2024-05-10 DIAGNOSIS — E66.811 CLASS 1 OBESITY WITHOUT SERIOUS COMORBIDITY WITH BODY MASS INDEX (BMI) OF 31.0 TO 31.9 IN ADULT, UNSPECIFIED OBESITY TYPE: ICD-10-CM

## 2024-05-10 DIAGNOSIS — E03.9 HYPOTHYROIDISM, UNSPECIFIED TYPE: ICD-10-CM

## 2024-05-10 DIAGNOSIS — Z00.00 ROUTINE GENERAL MEDICAL EXAMINATION AT A HEALTH CARE FACILITY: Primary | ICD-10-CM

## 2024-05-10 DIAGNOSIS — E28.2 PCOS (POLYCYSTIC OVARIAN SYNDROME): ICD-10-CM

## 2024-05-10 LAB
CHOLEST SERPL-MCNC: 115 MG/DL
FASTING STATUS PATIENT QL REPORTED: NO
HDLC SERPL-MCNC: 46 MG/DL
LDLC SERPL CALC-MCNC: 59 MG/DL
NONHDLC SERPL-MCNC: 69 MG/DL
TRIGL SERPL-MCNC: 49 MG/DL
TSH SERPL DL<=0.005 MIU/L-ACNC: 4.08 UIU/ML (ref 0.3–4.2)

## 2024-05-10 PROCEDURE — 36415 COLL VENOUS BLD VENIPUNCTURE: CPT | Performed by: NURSE PRACTITIONER

## 2024-05-10 PROCEDURE — 80061 LIPID PANEL: CPT | Performed by: NURSE PRACTITIONER

## 2024-05-10 PROCEDURE — 84443 ASSAY THYROID STIM HORMONE: CPT | Performed by: NURSE PRACTITIONER

## 2024-05-10 PROCEDURE — 99214 OFFICE O/P EST MOD 30 MIN: CPT | Mod: 25 | Performed by: NURSE PRACTITIONER

## 2024-05-10 PROCEDURE — 99395 PREV VISIT EST AGE 18-39: CPT | Performed by: NURSE PRACTITIONER

## 2024-05-10 RX ORDER — ALBUTEROL SULFATE 90 UG/1
1 AEROSOL, METERED RESPIRATORY (INHALATION) EVERY 6 HOURS PRN
Qty: 18 G | Refills: 4 | Status: CANCELLED | OUTPATIENT
Start: 2024-05-10

## 2024-05-10 RX ORDER — BUSPIRONE HYDROCHLORIDE 10 MG/1
TABLET ORAL
Qty: 30 TABLET | Refills: 0 | Status: SHIPPED | OUTPATIENT
Start: 2024-05-10 | End: 2024-07-08

## 2024-05-10 RX ORDER — ALBUTEROL SULFATE 90 UG/1
1 AEROSOL, METERED RESPIRATORY (INHALATION) EVERY 6 HOURS PRN
Qty: 18 G | Refills: 4 | Status: SHIPPED | OUTPATIENT
Start: 2024-05-10

## 2024-05-10 SDOH — HEALTH STABILITY: PHYSICAL HEALTH: ON AVERAGE, HOW MANY MINUTES DO YOU ENGAGE IN EXERCISE AT THIS LEVEL?: 20 MIN

## 2024-05-10 SDOH — HEALTH STABILITY: PHYSICAL HEALTH: ON AVERAGE, HOW MANY DAYS PER WEEK DO YOU ENGAGE IN MODERATE TO STRENUOUS EXERCISE (LIKE A BRISK WALK)?: 3 DAYS

## 2024-05-10 ASSESSMENT — ANXIETY QUESTIONNAIRES
GAD7 TOTAL SCORE: 11
5. BEING SO RESTLESS THAT IT IS HARD TO SIT STILL: NOT AT ALL
GAD7 TOTAL SCORE: 11
7. FEELING AFRAID AS IF SOMETHING AWFUL MIGHT HAPPEN: NOT AT ALL
IF YOU CHECKED OFF ANY PROBLEMS ON THIS QUESTIONNAIRE, HOW DIFFICULT HAVE THESE PROBLEMS MADE IT FOR YOU TO DO YOUR WORK, TAKE CARE OF THINGS AT HOME, OR GET ALONG WITH OTHER PEOPLE: VERY DIFFICULT
1. FEELING NERVOUS, ANXIOUS, OR ON EDGE: NEARLY EVERY DAY
6. BECOMING EASILY ANNOYED OR IRRITABLE: NEARLY EVERY DAY
3. WORRYING TOO MUCH ABOUT DIFFERENT THINGS: MORE THAN HALF THE DAYS
2. NOT BEING ABLE TO STOP OR CONTROL WORRYING: MORE THAN HALF THE DAYS

## 2024-05-10 ASSESSMENT — ASTHMA QUESTIONNAIRES
ACT_TOTALSCORE: 25
QUESTION_5 LAST FOUR WEEKS HOW WOULD YOU RATE YOUR ASTHMA CONTROL: COMPLETELY CONTROLLED
QUESTION_1 LAST FOUR WEEKS HOW MUCH OF THE TIME DID YOUR ASTHMA KEEP YOU FROM GETTING AS MUCH DONE AT WORK, SCHOOL OR AT HOME: NONE OF THE TIME
QUESTION_2 LAST FOUR WEEKS HOW OFTEN HAVE YOU HAD SHORTNESS OF BREATH: NOT AT ALL
QUESTION_4 LAST FOUR WEEKS HOW OFTEN HAVE YOU USED YOUR RESCUE INHALER OR NEBULIZER MEDICATION (SUCH AS ALBUTEROL): NOT AT ALL
QUESTION_3 LAST FOUR WEEKS HOW OFTEN DID YOUR ASTHMA SYMPTOMS (WHEEZING, COUGHING, SHORTNESS OF BREATH, CHEST TIGHTNESS OR PAIN) WAKE YOU UP AT NIGHT OR EARLIER THAN USUAL IN THE MORNING: NOT AT ALL
ACT_TOTALSCORE: 25

## 2024-05-10 ASSESSMENT — PAIN SCALES - GENERAL: PAINLEVEL: NO PAIN (0)

## 2024-05-10 ASSESSMENT — PATIENT HEALTH QUESTIONNAIRE - PHQ9: 5. POOR APPETITE OR OVEREATING: SEVERAL DAYS

## 2024-05-10 ASSESSMENT — SOCIAL DETERMINANTS OF HEALTH (SDOH): HOW OFTEN DO YOU GET TOGETHER WITH FRIENDS OR RELATIVES?: TWICE A WEEK

## 2024-05-10 NOTE — LETTER
My Asthma Action Plan    Name: Zelda Paulson   YOB: 1995  Date: 5/10/2024   My doctor: QUINTIN Fisher CNP   My clinic: [facility]        My Rescue Medicine:   Albuterol inhaler (Proair/Ventolin/Proventil HFA)  2-4 puffs EVERY 4 HOURS as needed. Use a spacer if recommended by your provider.   My Asthma Severity:   Intermittent / Exercise Induced  Know your asthma triggers: upper respiratory infections             GREEN ZONE   Good Control  I feel good  No cough or wheeze  Can work, sleep and play without asthma symptoms       Take your asthma control medicine every day.     If exercise triggers your asthma, take your rescue medication  15 minutes before exercise or sports, and  During exercise if you have asthma symptoms  Spacer to use with inhaler: If you have a spacer, make sure to use it with your inhaler             YELLOW ZONE Getting Worse  I have ANY of these:  I do not feel good  Cough or wheeze  Chest feels tight  Wake up at night   Keep taking your Green Zone medications  Start taking your rescue medicine:  every 20 minutes for up to 1 hour. Then every 4 hours for 24-48 hours.  If you stay in the Yellow Zone for more than 12-24 hours, contact your doctor.  If you do not return to the Green Zone in 12-24 hours or you get worse, start taking your oral steroid medicine if prescribed by your provider.           RED ZONE Medical Alert - Get Help  I have ANY of these:  I feel awful  Medicine is not helping  Breathing getting harder  Trouble walking or talking  Nose opens wide to breathe       Take your rescue medicine NOW  If your provider has prescribed an oral steroid medicine, start taking it NOW  Call your doctor NOW  If you are still in the Red Zone after 20 minutes and you have not reached your doctor:  Take your rescue medicine again and  Call 911 or go to the emergency room right away    See your regular doctor within 2 weeks of an Emergency Room or Urgent  Care visit for follow-up treatment.          Annual Reminders:  Meet with Asthma Educator,  Flu Shot in the Fall, consider Pneumonia Vaccination for patients with asthma (aged 19 and older).    Pharmacy:    [id number] - [place], [place] - [address] [id number] - [place] - [address]    Electronically signed by QUINTIN Fisher CNP   Date: 05/10/24                    Asthma Triggers  How To Control Things That Make Your Asthma Worse    Triggers are things that make your asthma worse.  Look at the list below to help you find your triggers and   what you can do about them. You can help prevent asthma flare-ups by staying away from your triggers.      Trigger                                                          What you can do   Cigarette Smoke  Tobacco smoke can make asthma worse. Do not allow smoking in your home, car or around you.  Be sure no one smokes at a child s day care or school.  If you smoke, ask your health care provider for ways to help you quit.  Ask family members to quit too.  Ask your health care provider for a referral to Quit Plan to help you quit smoking, or call 1-743-637-PLAN.     Colds, Flu, Bronchitis  These are common triggers of asthma. Wash your hands often.  Don t touch your eyes, nose or mouth.  Get a flu shot every year.     Dust Mites  These are tiny bugs that live in cloth or carpet. They are too small to see. Wash sheets and blankets in hot water every week.   Encase pillows and mattress in dust mite proof covers.  Avoid having carpet if you can. If you have carpet, vacuum weekly.   Use a dust mask and HEPA vacuum.   Pollen and Outdoor Mold  Some people are allergic to trees, grass, or weed pollen, or molds. Try to keep your windows closed.  Limit time out doors when pollen count is high.   Ask you health care provider about taking medicine during allergy season.     Animal Dander  Some people are allergic  to skin flakes, urine or saliva from pets with fur or feathers. Keep pets with fur or feathers out of your home.    If you can t keep the pet outdoors, then keep the pet out of your bedroom.  Keep the bedroom door closed.  Keep pets off cloth furniture and away from stuffed toys.     Mice, Rats, and Cockroaches  Some people are allergic to the waste from these pests.   Cover food and garbage.  Clean up spills and food crumbs.  Store grease in the refrigerator.   Keep food out of the bedroom.   Indoor Mold  This can be a trigger if your home has high moisture. Fix leaking faucets, pipes, or other sources of water.   Clean moldy surfaces.  Dehumidify basement if it is damp and smelly.   Smoke, Strong Odors, and Sprays  These can reduce air quality. Stay away from strong odors and sprays, such as perfume, powder, hair spray, paints, smoke incense, paint, cleaning products, candles and new carpet.   Exercise or Sports  Some people with asthma have this trigger. Be active!  Ask your doctor about taking medicine before sports or exercise to prevent symptoms.    Warm up for 5-10 minutes before and after sports or exercise.     Other Triggers of Asthma  Cold air:  Cover your nose and mouth with a scarf.  Sometimes laughing or crying can be a trigger.  Some medicines and food can trigger asthma.

## 2024-05-10 NOTE — PROGRESS NOTES
Preventive Care Visit  [facility] QUINTIN Vieira CNP, Family Medicine  May 10, 2024      Assessment & Plan     Routine general medical examination at a health care facility  Updated HM   Declined vaccines    Mild intermittent asthma without complication  Stable and refill provided of Albuterol      3/28/2023     8:14 PM 12/13/2023     9:26 AM 5/10/2024     8:32 AM   ACT Total Scores   ACT TOTAL SCORE (Goal Greater than or Equal to 20) 23 22 25   In the past 12 months, how many times did you visit the emergency room for your asthma without being admitted to the hospital? 0 0 0   In the past 12 months, how many times were you hospitalized overnight because of your asthma? 0 0 0         PCOS (polycystic ovarian syndrome)  Improved with birth control and weight loss     Hypothyroidism, unspecified type  Recheck, has been off her medication since December  Has had weight loss and hoping it is still normal.   Wt Readings from Last 2 Encounters:   05/10/24 78.5 kg (173 lb)   01/09/24 84.1 kg (185 lb 4.8 oz)     - TSH with free T4 reflex; Future  - TSH with free T4 reflex    Class 1 obesity without serious comorbidity with body mass index (BMI) of 31.0 to 31.9 in adult, unspecified obesity type  Wt Readings from Last 2 Encounters:   05/10/24 78.5 kg (173 lb)   01/09/24 84.1 kg (185 lb 4.8 oz)     Working with a weight loss clinic in [place]  Doing well on GLP  - Lipid panel reflex to direct LDL Non-fasting; Future  - Lipid panel reflex to direct LDL Non-fasting    WALI (generalized anxiety disorder)  Increased in the last couple months with having children and life stressors  Discussed medication options and non-pharmacological recommendations as well.   Will trial buspar  Discussed medication and side effects  Follow up with virtual in 4 weeks.   - busPIRone (BUSPAR) 10 MG tablet; Start with 1 tablet daily for 1 week then increase to 1 tablet twice daily.    The patient indicates understanding  "of these issues and agrees with the plan.        BMI  Estimated body mass index is 31.64 kg/m  as calculated from the following:    Height as of this encounter: 1.575 m (5' 2\").    Weight as of this encounter: 78.5 kg (173 lb).   Weight management plan: Specific weight management program called [facility]  discussed    Counseling  Appropriate preventive services were discussed with this patient, including applicable screening as appropriate for fall prevention, nutrition, physical activity, Tobacco-use cessation, weight loss and cognition.  Checklist reviewing preventive services available has been given to the patient.  Reviewed patient's diet, addressing concerns and/or questions.   She is at risk for lack of exercise and has been provided with information to increase physical activity for the benefit of her well-being.       See Patient Instructions    Subjective   Zelda is a 29 year old, presenting for the following:  Physical         Health Care Directive  Patient does not have a Health Care Directive or Living Will: Discussed advance care planning with patient; information given to patient to review.    HPI        8/16/2017    10:08 AM 5/10/2024     8:45 AM   WALI-7 SCORE   Total Score 3 11             5/10/2024   General Health   How would you rate your overall physical health? Good   Feel stress (tense, anxious, or unable to sleep) To some extent   (!) STRESS CONCERN      5/10/2024   Nutrition   Three or more servings of calcium each day? (!) NO   Diet: Regular (no restrictions)    Other   If other, please elaborate: i fast from 6 pm to 8 am   How many servings of fruit and vegetables per day? (!) 2-3   How many sweetened beverages each day? 0-1         5/10/2024   Exercise   Days per week of moderate/strenous exercise 3 days   Average minutes spent exercising at this level 20 min         5/10/2024   Social Factors   Frequency of gathering with friends or relatives Twice a week   Worry food " won't last until get money to buy more No   Food not last or not have enough money for food? No   Do you have housing?  Yes   Are you worried about losing your housing? No   Lack of transportation? No   Unable to get utilities (heat,electricity)? No         5/10/2024   Dental   Dentist two times every year? Yes         5/10/2024   TB Screening   Were you born outside of the [place]? No         Today's PHQ-2 Score:       5/10/2024     8:31 AM   PHQ-2 ( 1999 [employer])   Q1: Little interest or pleasure in doing things 1   Q2: Feeling down, depressed or hopeless 1   PHQ-2 Score 2   Q1: Little interest or pleasure in doing things Several days   Q2: Feeling down, depressed or hopeless Several days   PHQ-2 Score 2           5/10/2024   Substance Use   Alcohol more than 3/day or more than 7/wk No   Do you use any other substances recreationally? No     Social History     Tobacco Use    Smoking status: Never    Smokeless tobacco: Never   Vaping Use    Vaping status: Never Used   Substance Use Topics    Alcohol use: Not Currently     Comment: rare    Drug use: No        Mammogram Screening - Patient under 40 years of age: Routine Mammogram Screening not recommended.         5/10/2024   STI Screening   New sexual partner(s) since last STI/HIV test? No     History of abnormal Pap smear: NO - age 21-29 PAP every 3 years recommended        1/30/2023     3:30 PM   PAP / HPV   PAP Negative for Intraepithelial Lesion or Malignancy (NILM)            5/10/2024   Contraception/Family Planning   Questions about contraception or family planning No        Reviewed and updated as needed this visit by Provider                    Past Medical History:   Diagnosis Date    Chronic kidney disease     kidney stones    Hypertension, unspecified type 09/28/2021    Hypothyroidism     Iron deficiency     Obesity     PCOS (polycystic ovarian syndrome)     Thyroid disease     Uncomplicated asthma      Past Surgical History:   Procedure Laterality Date     "ABDOMINOPLASTY N/A 2023    Procedure: ABDOMINOPLASTY;  Surgeon: Aline Albarado MD;  Location:  OR    APPENDECTOMY      BREAST SURGERY      breast lumpectomy     SECTION N/A 2018    Procedure:  SECTION;;  Surgeon: Lela Looney MD;  Location:  L+D     SECTION  2018     SECTION N/A 2021    Procedure: REPEAT  SECTION;  Surgeon: Angie Rankin MD;  Location:  L+D    ENT SURGERY      tonsillectomy    LAPAROSCOPIC CHOLECYSTECTOMY WITH CHOLANGIOGRAMS N/A 2019    Procedure: LAPAROSCOPIC CHOLECYSTECTOMY WITH POSSIBLE INTRAOPERATIVE CHOLANGIOGRAMS;  Surgeon: Valentín Butler MD;  Location:  OR    MAMMOPLASTY REDUCTION BILATERAL Bilateral 2023    Procedure: BILATERAL REDUCTION MAMMOPLASTY;  Surgeon: Aline Albarado MD;  Location:  OR         Review of Systems  Constitutional, HEENT, cardiovascular, pulmonary, gi and gu systems are negative, except as otherwise noted.     Objective    Exam  /66   Pulse 74   Temp 98  F (36.7  C) (Temporal)   Resp 16   Ht 1.575 m (5' 2\")   Wt 78.5 kg (173 lb)   SpO2 99%   BMI 31.64 kg/m     Estimated body mass index is 31.64 kg/m  as calculated from the following:    Height as of this encounter: 1.575 m (5' 2\").    Weight as of this encounter: 78.5 kg (173 lb).    Physical Exam  GENERAL: alert and no distress  EYES: Eyes grossly normal to inspection, PERRL and conjunctivae and sclerae normal  HENT: ear canals and TM's normal, nose and mouth without ulcers or lesions  NECK: no adenopathy, no asymmetry, masses, or scars  RESP: lungs clear to auscultation - no rales, rhonchi or wheezes  BREAST: normal without masses, tenderness or nipple discharge and no palpable axillary masses or adenopathy  CV: regular rate and rhythm, normal S1 S2, no S3 or S4, no murmur, click or rub, no peripheral edema  ABDOMEN: soft, nontender, no hepatosplenomegaly, no masses and " bowel sounds normal  MS: no gross musculoskeletal defects noted, no edema  SKIN: no suspicious lesions or rashes  NEURO: Normal strength and tone, mentation intact and speech normal  PSYCH: mentation appears normal, affect normal/bright  LYMPH: no cervical, supraclavicular, axillary, or inguinal adenopathy        Signed Electronically by: QUINTIN Fisher CNP

## 2024-05-10 NOTE — PATIENT INSTRUCTIONS
"Preventive Care Advice   This is general advice we often give to help people stay healthy. Your care team may have specific advice just for you. Please talk to your care team about your own preventive care needs.  Lifestyle  Exercise at least 150 minutes each week (30 minutes a day, 5 days a week).  Do muscle strengthening activities 2 days a week. These help control your weight and prevent disease.  No smoking.  Wear sunscreen to prevent skin cancer.  Have your home tested for radon every 2 to 5 years. Radon is a colorless, odorless gas that can harm your lungs. To learn more, go to www.health.[facility].mn. and search for \"Radon in Homes.\"  Keep guns unloaded and locked up in a safe place like a safe or gun vault, or, use a gun lock and hide the keys. Always lock away bullets separately. To learn more, visit [employer].[place].gov and search for \"safe gun storage.\"  Nutrition  Eat 5 or more servings of fruits and vegetables each day.  Try wheat bread, brown rice and whole grain pasta (instead of white bread, rice, and pasta).  Get enough calcium and vitamin D. Check the label on foods and aim for 100% of the RDA (recommended daily allowance).  Regular exams  Have a dental exam and cleaning every 6 months.  See your health care team every year to talk about:  Any changes in your health.  Any medicines your care team has prescribed.  Preventive care, family planning, and ways to prevent chronic diseases.  Shots (vaccines)   HPV shots (up to age 26), if you've never had them before.  Hepatitis B shots (up to age 59), if you've never had them before.  COVID-19 shot: Get this shot when it's due.  Flu shot: Get a flu shot every year.  Tetanus shot: Get a tetanus shot every 10 years.  Pneumococcal, hepatitis A, and RSV shots: Ask your care team if you need these based on your risk.  Shingles shot (for age 50 and up).  General health tests  Diabetes screening:  Starting at age 35, Get screened for diabetes at least every 3 years.  If " you are younger than age 35, ask your care team if you should be screened for diabetes.  Cholesterol test: At age 39, start having a cholesterol test every 5 years, or more often if advised.  Bone density scan (DEXA): At age 50, ask your care team if you should have this scan for osteoporosis (brittle bones).  Hepatitis C: Get tested at least once in your life.  Abdominal aortic aneurysm screening: Talk to your doctor about having this screening if you:  Have ever smoked; and  Are biologically male; and  Are between the ages of 65 and 75.  STIs (sexually transmitted infections)  Before age 24: Ask your care team if you should be screened for STIs.  After age 24: Get screened for STIs if you're at risk. You are at risk for STIs (including HIV) if:  You are sexually active with more than one person.  You don't use condoms every time.  You or a partner was diagnosed with a sexually transmitted infection.  If you are at risk for HIV, ask about PrEP medicine to prevent HIV.  Get tested for HIV at least once in your life, whether you are at risk for HIV or not.  Cancer screening tests  Cervical cancer screening: If you have a cervix, begin getting regular cervical cancer screening tests at age 21. Most people who have regular screenings with normal results can stop after age 65. Talk about this with your provider.  Breast cancer scan (mammogram): If you've ever had breasts, begin having regular mammograms starting at age 40. This is a scan to check for breast cancer.  Colon cancer screening: It is important to start screening for colon cancer at age 45.  Have a colonoscopy test every 10 years (or more often if you're at risk) Or, ask your provider about stool tests like a FIT test every year or Cologuard test every 3 years.  To learn more about your testing options, visit: www.[employer]/167438.pdf.  For help making a decision, visit: emiliana/he81227.  Prostate cancer screening test: If you have a prostate and are age 55  to 69, ask your provider if you would benefit from a yearly prostate cancer screening test.  Lung cancer screening: If you are a current or former smoker age 50 to 80, ask your care team if ongoing lung cancer screenings are right for you.  For informational purposes only. Not to replace the advice of your health care provider. Copyright   2023 [employer]. All rights reserved. Clinically reviewed by the [facility]. [employer] 163601 - REV 04/24.    Learning About Stress  What is stress?     Stress is your body's response to a hard situation. Your body can have a physical, emotional, or mental response. Stress is a fact of life for most people, and it affects everyone differently. What causes stress for you may not be stressful for someone else.  A lot of things can cause stress. You may feel stress when you go on a job interview, take a test, or run a race. This kind of short-term stress is normal and even useful. It can help you if you need to work hard or react quickly. For example, stress can help you finish an important job on time.  Long-term stress is caused by ongoing stressful situations or events. Examples of long-term stress include long-term health problems, ongoing problems at work, or conflicts in your family. Long-term stress can harm your health.  How does stress affect your health?  When you are stressed, your body responds as though you are in danger. It makes hormones that speed up your heart, make you breathe faster, and give you a burst of energy. This is called the fight-or-flight stress response. If the stress is over quickly, your body goes back to normal and no harm is done.  But if stress happens too often or lasts too long, it can have bad effects. Long-term stress can make you more likely to get sick, and it can make symptoms of some diseases worse. If you tense up when you are stressed, you may develop neck, shoulder, or low back pain. Stress is  linked to high blood pressure and heart disease.  Stress also harms your emotional health. It can make you julien, tense, or depressed. Your relationships may suffer, and you may not do well at work or school.  What can you do to manage stress?  You can try these things to help manage stress:   Do something active. Exercise or activity can help reduce stress. Walking is a great way to get started. Even everyday activities such as housecleaning or yard work can help.  Try yoga or monroe chi. These techniques combine exercise and meditation. You may need some training at first to learn them.  Do something you enjoy. For example, listen to music or go to a movie. Practice your hobby or do volunteer work.  Meditate. This can help you relax, because you are not worrying about what happened before or what may happen in the future.  Do guided imagery. Imagine yourself in any setting that helps you feel calm. You can use online videos, books, or a teacher to guide you.  Do breathing exercises. For example:  From a standing position, bend forward from the waist with your knees slightly bent. Let your arms dangle close to the floor.  Breathe in slowly and deeply as you return to a standing position. Roll up slowly and lift your head last.  Hold your breath for just a few seconds in the standing position.  Breathe out slowly and bend forward from the waist.  Let your feelings out. Talk, laugh, cry, and express anger when you need to. Talking with supportive friends or family, a counselor, or a dion leader about your feelings is a healthy way to relieve stress. Avoid discussing your feelings with people who make you feel worse.  Write. It may help to write about things that are bothering you. This helps you find out how much stress you feel and what is causing it. When you know this, you can find better ways to cope.  What can you do to prevent stress?  You might try some of these things to help prevent stress:  Manage your time.  "This helps you find time to do the things you want and need to do.  Get enough sleep. Your body recovers from the stresses of the day while you are sleeping.  Get support. Your family, friends, and community can make a difference in how you experience stress.  Limit your news feed. Avoid or limit time on social media or news that may make you feel stressed.  Do something active. Exercise or activity can help reduce stress. Walking is a great way to get started.  Where can you learn more?  Go to https://www.[employer].net/patiented  Enter N032 in the search box to learn more about \"Learning About Stress.\"  Current as of: October 24, 2023               Content Version: 14.0    9041-5975 [employer].   Care instructions adapted under license by your healthcare professional. If you have questions about a medical condition or this instruction, always ask your healthcare professional. [employer] disclaims any warranty or liability for your use of this information.      "

## 2024-06-07 ENCOUNTER — VIRTUAL VISIT (OUTPATIENT)
Dept: FAMILY MEDICINE | Facility: OTHER | Age: 29
End: 2024-06-07
Payer: COMMERCIAL

## 2024-06-07 DIAGNOSIS — F41.1 GAD (GENERALIZED ANXIETY DISORDER): Primary | ICD-10-CM

## 2024-06-07 PROCEDURE — 99213 OFFICE O/P EST LOW 20 MIN: CPT | Mod: 95 | Performed by: NURSE PRACTITIONER

## 2024-06-07 RX ORDER — ESCITALOPRAM OXALATE 10 MG/1
10 TABLET ORAL DAILY
Qty: 30 TABLET | Refills: 0 | Status: SHIPPED | OUTPATIENT
Start: 2024-06-07 | End: 2024-07-08

## 2024-06-07 ASSESSMENT — ANXIETY QUESTIONNAIRES
7. FEELING AFRAID AS IF SOMETHING AWFUL MIGHT HAPPEN: NOT AT ALL
2. NOT BEING ABLE TO STOP OR CONTROL WORRYING: SEVERAL DAYS
GAD7 TOTAL SCORE: 6
1. FEELING NERVOUS, ANXIOUS, OR ON EDGE: MORE THAN HALF THE DAYS
5. BEING SO RESTLESS THAT IT IS HARD TO SIT STILL: NOT AT ALL
7. FEELING AFRAID AS IF SOMETHING AWFUL MIGHT HAPPEN: NOT AT ALL
3. WORRYING TOO MUCH ABOUT DIFFERENT THINGS: SEVERAL DAYS
6. BECOMING EASILY ANNOYED OR IRRITABLE: MORE THAN HALF THE DAYS
IF YOU CHECKED OFF ANY PROBLEMS ON THIS QUESTIONNAIRE, HOW DIFFICULT HAVE THESE PROBLEMS MADE IT FOR YOU TO DO YOUR WORK, TAKE CARE OF THINGS AT HOME, OR GET ALONG WITH OTHER PEOPLE: SOMEWHAT DIFFICULT
8. IF YOU CHECKED OFF ANY PROBLEMS, HOW DIFFICULT HAVE THESE MADE IT FOR YOU TO DO YOUR WORK, TAKE CARE OF THINGS AT HOME, OR GET ALONG WITH OTHER PEOPLE?: SOMEWHAT DIFFICULT
GAD7 TOTAL SCORE: 6
4. TROUBLE RELAXING: NOT AT ALL

## 2024-06-07 NOTE — PROGRESS NOTES
Zelda is a 29 year old who is being evaluated via a billable video visit.    How would you like to obtain your AVS? MyChart  If the video visit is dropped, the invitation should be resent by: Text to cell phone: [phone]  Will anyone else be joining your video visit? No    Assessment & Plan     WALI (generalized anxiety disorder)  Will start on Lexapro 10mg. . Side effects discussed and provided in patient instructions.  Discussed the pathophysiology of this medication and its effect on serotonin.   Discussed that this medication can take at least 4-6 weeks to have the full effect. She should not be discouraged if she does not see her symptoms improving right away.   We discussed mental health and other ways to help including healthy diet, exercise and advising her family of her medication as well to help with monitoring improvements.   Follow up in 4 weeks, this has been scheduled.   - escitalopram (LEXAPRO) 10 MG tablet; Take 1 tablet (10 mg) by mouth daily        See Patient Instructions    Subjective   Zelda is a 29 year old, presenting for the following health issues:  Recheck Medication      6/7/2024    11:32 AM   Additional Questions   Roomed by yash   Accompanied by alone         6/7/2024    11:32 AM   Patient Reported Additional Medications   Patient reports taking the following new medications none     History of Present Illness       Mental Health Follow-up:  Patient presents to follow-up on Anxiety.    Patient's anxiety since last visit has been:  Medium  The patient is not having other symptoms associated with anxiety.  Any significant life events: No  Patient is not feeling anxious or having panic attacks.  Patient has no concerns about alcohol or drug use.    She eats 2-3 servings of fruits and vegetables daily.She consumes 3 sweetened beverage(s) daily.She exercises with enough effort to increase her heart rate 20 to 29 minutes per day.  She exercises with enough effort to increase her heart  rate 3 or less days per week. She is missing 2 dose(s) of medications per week.  She is not taking prescribed medications regularly due to remembering to take.     Buspar- The first week was a little hard. She would take it and then was lightheaded and now that is subsided.   She is not feeling it is helping her anxiety. She is taking it twice daily.         Review of Systems  Constitutional, HEENT, cardiovascular, pulmonary, gi and gu systems are negative, except as otherwise noted.      Objective    Vitals - Patient Reported  Pain Score: No Pain (0)        Physical Exam   GENERAL: alert and no distress  SKIN: Visible skin clear. No significant rash, abnormal pigmentation or lesions.  NEURO: Cranial nerves grossly intact.  Mentation and speech appropriate for age.  PSYCH: Appropriate affect, tone, and pace of words    Office Visit on 05/10/2024   Component Date Value Ref Range Status    TSH 05/10/2024 4.08  0.30 - 4.20 uIU/mL Final    Cholesterol 05/10/2024 115  <200 mg/dL Final    Triglycerides 05/10/2024 49  <150 mg/dL Final    Direct Measure HDL 05/10/2024 46 (L)  >=50 mg/dL Final    LDL Cholesterol Calculated 05/10/2024 59  <=100 mg/dL Final    Non HDL Cholesterol 05/10/2024 69  <130 mg/dL Final    Patient Fasting > 8hrs? 05/10/2024 No   Final         Video-Visit Details    Type of service:  Video Visit   Originating Location (pt. Location): Home    Distant Location (provider location):  On-site  Platform used for Video Visit: Eleno  Signed Electronically by: QUINTIN Fisher CNP

## 2024-06-17 ENCOUNTER — LAB REQUISITION (OUTPATIENT)
Dept: LAB | Facility: CLINIC | Age: 29
End: 2024-06-17

## 2024-06-17 DIAGNOSIS — Z11.1 ENCOUNTER FOR SCREENING FOR RESPIRATORY TUBERCULOSIS: ICD-10-CM

## 2024-06-17 PROCEDURE — 86481 TB AG RESPONSE T-CELL SUSP: CPT | Performed by: OBSTETRICS & GYNECOLOGY

## 2024-06-18 LAB
GAMMA INTERFERON BACKGROUND BLD IA-ACNC: 0.01 IU/ML
M TB IFN-G BLD-IMP: NEGATIVE
M TB IFN-G CD4+ BCKGRND COR BLD-ACNC: 9.99 IU/ML
MITOGEN IGNF BCKGRD COR BLD-ACNC: 0.03 IU/ML
MITOGEN IGNF BCKGRD COR BLD-ACNC: 0.04 IU/ML
QUANTIFERON MITOGEN: 10 IU/ML
QUANTIFERON NIL TUBE: 0.01 IU/ML
QUANTIFERON TB1 TUBE: 0.05 IU/ML
QUANTIFERON TB2 TUBE: 0.04

## 2024-07-08 ENCOUNTER — VIRTUAL VISIT (OUTPATIENT)
Dept: FAMILY MEDICINE | Facility: OTHER | Age: 29
End: 2024-07-08
Payer: COMMERCIAL

## 2024-07-08 DIAGNOSIS — F41.1 GAD (GENERALIZED ANXIETY DISORDER): ICD-10-CM

## 2024-07-08 PROCEDURE — 99213 OFFICE O/P EST LOW 20 MIN: CPT | Mod: 95 | Performed by: NURSE PRACTITIONER

## 2024-07-08 RX ORDER — ESCITALOPRAM OXALATE 10 MG/1
10 TABLET ORAL DAILY
Qty: 30 TABLET | Refills: 0 | OUTPATIENT
Start: 2024-07-08

## 2024-07-08 RX ORDER — ESCITALOPRAM OXALATE 10 MG/1
10 TABLET ORAL DAILY
Qty: 90 TABLET | Refills: 2 | Status: SHIPPED | OUTPATIENT
Start: 2024-07-08

## 2024-07-08 ASSESSMENT — ANXIETY QUESTIONNAIRES
6. BECOMING EASILY ANNOYED OR IRRITABLE: MORE THAN HALF THE DAYS
GAD7 TOTAL SCORE: 6
4. TROUBLE RELAXING: SEVERAL DAYS
IF YOU CHECKED OFF ANY PROBLEMS ON THIS QUESTIONNAIRE, HOW DIFFICULT HAVE THESE PROBLEMS MADE IT FOR YOU TO DO YOUR WORK, TAKE CARE OF THINGS AT HOME, OR GET ALONG WITH OTHER PEOPLE: SOMEWHAT DIFFICULT
7. FEELING AFRAID AS IF SOMETHING AWFUL MIGHT HAPPEN: NOT AT ALL
8. IF YOU CHECKED OFF ANY PROBLEMS, HOW DIFFICULT HAVE THESE MADE IT FOR YOU TO DO YOUR WORK, TAKE CARE OF THINGS AT HOME, OR GET ALONG WITH OTHER PEOPLE?: SOMEWHAT DIFFICULT
2. NOT BEING ABLE TO STOP OR CONTROL WORRYING: SEVERAL DAYS
7. FEELING AFRAID AS IF SOMETHING AWFUL MIGHT HAPPEN: NOT AT ALL
3. WORRYING TOO MUCH ABOUT DIFFERENT THINGS: SEVERAL DAYS
GAD7 TOTAL SCORE: 6
5. BEING SO RESTLESS THAT IT IS HARD TO SIT STILL: NOT AT ALL
1. FEELING NERVOUS, ANXIOUS, OR ON EDGE: SEVERAL DAYS

## 2024-07-08 ASSESSMENT — ENCOUNTER SYMPTOMS: NERVOUS/ANXIOUS: 1

## 2024-07-08 NOTE — PROGRESS NOTES
Zelda is a 29 year old who is being evaluated via a billable video visit.    How would you like to obtain your AVS? MyChart  If the video visit is dropped, the invitation should be resent by: Text to cell phone: [phone]  Will anyone else be joining your video visit? No    Assessment & Plan     WALI (generalized anxiety disorder)  Improved  Would like to continue on current dose  Recommend recheck if needed virtually otherwise with physical in 6-9 months.   - escitalopram (LEXAPRO) 10 MG tablet; Take 1 tablet (10 mg) by mouth daily            See Patient Instructions    Subjective   Zelda is a 29 year old, presenting for the following health issues:  Anxiety      7/8/2024     7:47 AM   Additional Questions   Roomed by odilon   Accompanied by self     Anxiety    History of Present Illness       Mental Health Follow-up:  Patient presents to follow-up on Anxiety.    Patient's anxiety since last visit has been:  Better  The patient is not having other symptoms associated with anxiety.  Any significant life events: No  Patient is not feeling anxious or having panic attacks.  Patient has no concerns about alcohol or drug use.          5/10/2024     8:45 AM 6/7/2024    11:29 AM 7/8/2024     7:46 AM   WALI-7 SCORE   Total Score  6 (mild anxiety) 6 (mild anxiety)   Total Score 11 6 6      Patient continues on the Lexapro and this is a lot better than the buspar that she was on. She notes that she does not go straight to being stressed out is able to utilize calming and notices this with her kids as well.             Review of Systems  Constitutional, HEENT, cardiovascular, pulmonary, gi and gu systems are negative, except as otherwise noted.      Objective           Vitals:  No vitals were obtained today due to virtual visit.    Physical Exam   GENERAL: alert and no distress  NEURO: Cranial nerves grossly intact.  Mentation and speech appropriate for age.  PSYCH: Appropriate affect, tone, and pace of words    Diagnostic: None        Video-Visit Details    Type of service:  Video Visit   Originating Location (pt. Location): Home    Distant Location (provider location):  Off-site  Platform used for Video Visit: Eleno  Signed Electronically by: QUINTIN Fisher CNP

## 2024-10-11 NOTE — INTERVAL H&P NOTE
"I have reviewed the surgical (or preoperative) H&P that is linked to this encounter, and examined the patient. There are no significant changes    Clinical Conditions Present on Arrival:  Clinically Significant Risk Factors Present on Admission                  # Obesity: Estimated body mass index is 35.96 kg/m  as calculated from the following:    Height as of this encounter: 1.575 m (5' 2\").    Weight as of this encounter: 89.2 kg (196 lb 9.6 oz).       " Abdomen soft, no guarding. +left abd ttp and L flank ttp

## 2025-06-28 ENCOUNTER — HEALTH MAINTENANCE LETTER (OUTPATIENT)
Age: 30
End: 2025-06-28

## (undated) DEVICE — ESU GROUND PAD UNIVERSAL W/O CORD

## (undated) DEVICE — GLOVE PROTEXIS W/NEU-THERA 8.0  2D73TE80

## (undated) DEVICE — GOWN IMPERVIOUS SPECIALTY XLG/XLONG 32474

## (undated) DEVICE — SOL NACL 0.9% INJ 1000ML BAG 2B1324X

## (undated) DEVICE — SOL NACL 0.9% IRRIG 1000ML BOTTLE 07138-09

## (undated) DEVICE — TUBING INFUSION INFILTRATION LIPOSUCTION 156" 24-6008

## (undated) DEVICE — LINEN TOWEL PACK X5 5464

## (undated) DEVICE — BLADE KNIFE SURG 10 371110

## (undated) DEVICE — SUCTION CANISTER MEDIVAC LINER 3000ML W/LID 65651-530

## (undated) DEVICE — SU VICRYL 0 CT 36" J358H

## (undated) DEVICE — TUBING SUCTION LIPECTOMY

## (undated) DEVICE — DRAPE IOBAN C-SECTION W/POUCH 30X35" 6657

## (undated) DEVICE — TUBING INFILTRATION SINGLE SPIKE 188" 24-6001-00

## (undated) DEVICE — PACK SET-UP STD 9102

## (undated) DEVICE — SU VICRYL 3-0 SH 27" J316H

## (undated) DEVICE — ENDO TROCAR FIRST ENTRY KII FIOS Z-THRD 05X100MM CTF03

## (undated) DEVICE — SPONGE LAP 18X18" X8435

## (undated) DEVICE — DRAPE SHEET REV FOLD 3/4 9349

## (undated) DEVICE — SYSTEM CLEARIFY VISUALIZATION 21-345

## (undated) DEVICE — SU PDS II 2-0 CT-1 27" Z339H

## (undated) DEVICE — SU VICRYL 4-0 PS-2 18" UND J496H

## (undated) DEVICE — SOL NACL 0.9% IRRIG 1000ML BOTTLE 2F7124

## (undated) DEVICE — SYR 10ML SLIP TIP W/O NDL 303134

## (undated) DEVICE — SU NUROLON 0 CT-1 CR 8X18" C521D

## (undated) DEVICE — SU VICRYL 4-0 P-3 18" J464G

## (undated) DEVICE — BLADE CLIPPER 4406

## (undated) DEVICE — STPL SKIN 35W ROTATING HEAD PRW35

## (undated) DEVICE — ESU ELEC BLADE HEX-LOCKING 2.5" E1450X

## (undated) DEVICE — ENDO TROCAR SLEEVE KII Z-THREADED 05X100MM CTS02

## (undated) DEVICE — DRAPE BREAST/CHEST 29420

## (undated) DEVICE — PACK MAJOR SBA15MAFSI

## (undated) DEVICE — DRAIN JACKSON PRATT 15FR ROUND SU130-1323

## (undated) DEVICE — DRSG ABDOMINAL 07 1/2X8" 7197D

## (undated) DEVICE — SU MONOCRYL 4-0 PS-2 18" UND Y496G

## (undated) DEVICE — LIGHT HANDLE X2

## (undated) DEVICE — SU MONOCRYL 3-0 PS-2 27" Y427H

## (undated) DEVICE — ESU CORD MONOPOLAR 10'  E0510

## (undated) DEVICE — SOL WATER IRRIG 1000ML BOTTLE 2F7114

## (undated) DEVICE — GLOVE PROTEXIS W/NEU-THERA 7.5  2D73TE75

## (undated) DEVICE — WIPES FOLEY CARE SURESTEP PROVON DFC100

## (undated) DEVICE — GOWN LG DISP 9515

## (undated) DEVICE — DRSG XEROFORM 5X9" 8884431605

## (undated) DEVICE — LINEN C-SECTION 5415

## (undated) DEVICE — NDL 19GA 1.5"

## (undated) DEVICE — NDL COUNTER 40CT  31142311

## (undated) DEVICE — SYR 03ML LL W/O NDL 309657

## (undated) DEVICE — PREP CHLORAPREP 26ML TINTED ORANGE  260815

## (undated) DEVICE — ENDO TROCAR FIRST ENTRY KII FIOS Z-THRD 11X100MM CTF33

## (undated) DEVICE — CLIP APPLIER 11" MED LIGACLIP MCM20

## (undated) DEVICE — SOL WATER IRRIG 1000ML BOTTLE 07139-09

## (undated) DEVICE — CATH TRAY FOLEY 16FR BARDEX W/DRAIN BAG STATLOCK 300316A

## (undated) DEVICE — SU PDS II 0 CT 36" Z358T

## (undated) DEVICE — SU VICRYL 2-0 CT-1 27" J339H

## (undated) DEVICE — NDL INSUFFLATION 13GA 120MM C2201

## (undated) DEVICE — SU ETHILON 3-0 FS-1 18" 669H

## (undated) DEVICE — DRSG GAUZE 4X4" 3033

## (undated) DEVICE — SU ETHILON 5-0 P-3 18" BLACK 698G

## (undated) DEVICE — PACK C-SECTION LF PL15OTA83B

## (undated) DEVICE — LINEN BABY BLANKET 5434

## (undated) DEVICE — GLOVE PROTEXIS MICRO 7.0  2D73PM70

## (undated) DEVICE — SUCTION CANISTER MEDIVAC LINER 1500ML W/LID 65651-515

## (undated) DEVICE — GLOVE BIOGEL PI MICRO SZ 6.5 48565

## (undated) DEVICE — PAD CHUX UNDERPAD 23X24" 7136

## (undated) DEVICE — SUCTION IRR STRYKERFLOW II W/TIP 250-070-520

## (undated) DEVICE — SU MONOCRYL 5-0 P-3 18" UND Y493G

## (undated) DEVICE — GLOVE BIOGEL PI MICRO INDICATOR UNDERGLOVE SZ 7.5 48975

## (undated) DEVICE — BNDG ELASTIC 6" DBL LENGTH UNSTERILE 6611-16

## (undated) DEVICE — STPL SKIN PROXIMATE 35 WIDE PMW35

## (undated) DEVICE — CATH TRAY FOLEY SURESTEP 16FR WDRAIN BAG STLK LATEX A300316A

## (undated) DEVICE — DEVICE SUTURE GRASPER TROCAR CLOSURE 14GA PMITCSG

## (undated) DEVICE — BNDG ABDOMINAL BINDER 9X30-45" 79-89070

## (undated) DEVICE — DRAIN JACKSON PRATT RESERVOIR 100ML SU130-1305

## (undated) DEVICE — DRSG STERI STRIP 1/2X4" R1547

## (undated) DEVICE — DRSG KERLIX FLUFFS X5

## (undated) DEVICE — PACK LAP CHOLE SLC15LCFSD

## (undated) DEVICE — PREP SKIN SCRUB TRAY 4461A

## (undated) DEVICE — PEN MARKING SKIN W/LABELS 31145884

## (undated) DEVICE — SUCTION TRAP DELEE 10FR 20ML

## (undated) DEVICE — DRSG KERLIX 4 1/2"X4YDS ROLL 6715

## (undated) RX ORDER — ONDANSETRON 2 MG/ML
INJECTION INTRAMUSCULAR; INTRAVENOUS
Status: DISPENSED
Start: 2019-01-09

## (undated) RX ORDER — HEPARIN SODIUM 5000 [USP'U]/.5ML
INJECTION, SOLUTION INTRAVENOUS; SUBCUTANEOUS
Status: DISPENSED
Start: 2023-04-14

## (undated) RX ORDER — PROPOFOL 10 MG/ML
INJECTION, EMULSION INTRAVENOUS
Status: DISPENSED
Start: 2023-04-14

## (undated) RX ORDER — ONDANSETRON 2 MG/ML
INJECTION INTRAMUSCULAR; INTRAVENOUS
Status: DISPENSED
Start: 2021-07-07

## (undated) RX ORDER — ONDANSETRON 2 MG/ML
INJECTION INTRAMUSCULAR; INTRAVENOUS
Status: DISPENSED
Start: 2023-04-14

## (undated) RX ORDER — HYDROCODONE BITARTRATE AND ACETAMINOPHEN 5; 325 MG/1; MG/1
TABLET ORAL
Status: DISPENSED
Start: 2023-04-14

## (undated) RX ORDER — HYDROMORPHONE HCL IN WATER/PF 6 MG/30 ML
PATIENT CONTROLLED ANALGESIA SYRINGE INTRAVENOUS
Status: DISPENSED
Start: 2023-04-14

## (undated) RX ORDER — MORPHINE SULFATE 1 MG/ML
INJECTION, SOLUTION EPIDURAL; INTRATHECAL; INTRAVENOUS
Status: DISPENSED
Start: 2021-07-07

## (undated) RX ORDER — HYDROMORPHONE HYDROCHLORIDE 1 MG/ML
INJECTION, SOLUTION INTRAMUSCULAR; INTRAVENOUS; SUBCUTANEOUS
Status: DISPENSED
Start: 2019-01-09

## (undated) RX ORDER — HYDROCODONE BITARTRATE AND ACETAMINOPHEN 5; 325 MG/1; MG/1
TABLET ORAL
Status: DISPENSED
Start: 2019-01-09

## (undated) RX ORDER — OXYTOCIN/0.9 % SODIUM CHLORIDE 30/500 ML
PLASTIC BAG, INJECTION (ML) INTRAVENOUS
Status: DISPENSED
Start: 2021-07-07

## (undated) RX ORDER — CEFAZOLIN SODIUM 2 G/100ML
INJECTION, SOLUTION INTRAVENOUS
Status: DISPENSED
Start: 2019-01-09

## (undated) RX ORDER — PROPOFOL 10 MG/ML
INJECTION, EMULSION INTRAVENOUS
Status: DISPENSED
Start: 2019-01-09

## (undated) RX ORDER — VECURONIUM BROMIDE 1 MG/ML
INJECTION, POWDER, LYOPHILIZED, FOR SOLUTION INTRAVENOUS
Status: DISPENSED
Start: 2023-04-14

## (undated) RX ORDER — ACETAMINOPHEN 500 MG
TABLET ORAL
Status: DISPENSED
Start: 2023-04-14

## (undated) RX ORDER — NEOSTIGMINE METHYLSULFATE 1 MG/ML
VIAL (ML) INJECTION
Status: DISPENSED
Start: 2023-04-14

## (undated) RX ORDER — DIPHENHYDRAMINE HCL 25 MG
CAPSULE ORAL
Status: DISPENSED
Start: 2019-01-09

## (undated) RX ORDER — HYDROMORPHONE HYDROCHLORIDE 1 MG/ML
INJECTION, SOLUTION INTRAMUSCULAR; INTRAVENOUS; SUBCUTANEOUS
Status: DISPENSED
Start: 2023-04-14

## (undated) RX ORDER — HYDROMORPHONE HYDROCHLORIDE 1 MG/ML
INJECTION, SOLUTION INTRAMUSCULAR; INTRAVENOUS; SUBCUTANEOUS
Status: DISPENSED
Start: 2021-07-07

## (undated) RX ORDER — DEXAMETHASONE SODIUM PHOSPHATE 4 MG/ML
INJECTION, SOLUTION INTRA-ARTICULAR; INTRALESIONAL; INTRAMUSCULAR; INTRAVENOUS; SOFT TISSUE
Status: DISPENSED
Start: 2023-04-14

## (undated) RX ORDER — KETOROLAC TROMETHAMINE 30 MG/ML
INJECTION, SOLUTION INTRAMUSCULAR; INTRAVENOUS
Status: DISPENSED
Start: 2019-01-09

## (undated) RX ORDER — BUPIVACAINE HYDROCHLORIDE 2.5 MG/ML
INJECTION, SOLUTION EPIDURAL; INFILTRATION; INTRACAUDAL
Status: DISPENSED
Start: 2023-04-14

## (undated) RX ORDER — DEXAMETHASONE SODIUM PHOSPHATE 4 MG/ML
INJECTION, SOLUTION INTRA-ARTICULAR; INTRALESIONAL; INTRAMUSCULAR; INTRAVENOUS; SOFT TISSUE
Status: DISPENSED
Start: 2019-01-09

## (undated) RX ORDER — FENTANYL CITRATE 50 UG/ML
INJECTION, SOLUTION INTRAMUSCULAR; INTRAVENOUS
Status: DISPENSED
Start: 2023-04-14

## (undated) RX ORDER — ACETAMINOPHEN 325 MG/1
TABLET ORAL
Status: DISPENSED
Start: 2019-01-09

## (undated) RX ORDER — LIDOCAINE HYDROCHLORIDE 20 MG/ML
INJECTION, SOLUTION EPIDURAL; INFILTRATION; INTRACAUDAL; PERINEURAL
Status: DISPENSED
Start: 2019-01-09